# Patient Record
Sex: FEMALE | Race: WHITE | Employment: FULL TIME | URBAN - NONMETROPOLITAN AREA
[De-identification: names, ages, dates, MRNs, and addresses within clinical notes are randomized per-mention and may not be internally consistent; named-entity substitution may affect disease eponyms.]

---

## 2017-02-06 RX ORDER — DICYCLOMINE HYDROCHLORIDE 10 MG/1
CAPSULE ORAL
Qty: 240 CAPSULE | Refills: 2 | Status: SHIPPED | OUTPATIENT
Start: 2017-02-06 | End: 2017-12-05

## 2017-02-07 ENCOUNTER — TELEPHONE (OUTPATIENT)
Dept: CARDIOLOGY | Age: 47
End: 2017-02-07

## 2017-02-07 ENCOUNTER — OFFICE VISIT (OUTPATIENT)
Dept: CARDIOLOGY | Age: 47
End: 2017-02-07
Payer: COMMERCIAL

## 2017-02-07 VITALS
HEIGHT: 60 IN | HEART RATE: 68 BPM | WEIGHT: 143 LBS | BODY MASS INDEX: 28.07 KG/M2 | SYSTOLIC BLOOD PRESSURE: 100 MMHG | DIASTOLIC BLOOD PRESSURE: 72 MMHG

## 2017-02-07 DIAGNOSIS — I34.0 MILD MITRAL REGURGITATION BY PRIOR ECHOCARDIOGRAM: Primary | ICD-10-CM

## 2017-02-07 DIAGNOSIS — R00.2 INTERMITTENT PALPITATIONS: ICD-10-CM

## 2017-02-07 DIAGNOSIS — R06.02 SOB (SHORTNESS OF BREATH): ICD-10-CM

## 2017-02-07 PROCEDURE — 99213 OFFICE O/P EST LOW 20 MIN: CPT | Performed by: NURSE PRACTITIONER

## 2017-02-17 ENCOUNTER — HOSPITAL ENCOUNTER (OUTPATIENT)
Dept: NON INVASIVE DIAGNOSTICS | Age: 47
Discharge: HOME | End: 2017-02-17
Payer: COMMERCIAL

## 2017-02-17 ENCOUNTER — TELEPHONE (OUTPATIENT)
Dept: GASTROENTEROLOGY | Age: 47
End: 2017-02-17

## 2017-02-17 DIAGNOSIS — R06.02 SOB (SHORTNESS OF BREATH): ICD-10-CM

## 2017-02-17 DIAGNOSIS — I34.0 MILD MITRAL REGURGITATION BY PRIOR ECHOCARDIOGRAM: ICD-10-CM

## 2017-02-17 PROCEDURE — 93306 TTE W/DOPPLER COMPLETE: CPT

## 2017-08-15 ENCOUNTER — HOSPITAL ENCOUNTER (OUTPATIENT)
Dept: NEUROLOGY | Age: 47
Discharge: HOME | End: 2017-08-15
Payer: COMMERCIAL

## 2017-08-15 PROCEDURE — 95911 NRV CNDJ TEST 9-10 STUDIES: CPT | Performed by: PSYCHIATRY & NEUROLOGY

## 2017-08-15 PROCEDURE — 95911 NRV CNDJ TEST 9-10 STUDIES: CPT

## 2017-08-15 PROCEDURE — 95886 MUSC TEST DONE W/N TEST COMP: CPT | Performed by: PSYCHIATRY & NEUROLOGY

## 2017-08-15 PROCEDURE — 95886 MUSC TEST DONE W/N TEST COMP: CPT

## 2017-08-25 ENCOUNTER — TELEPHONE (OUTPATIENT)
Dept: CARDIOLOGY | Age: 47
End: 2017-08-25

## 2017-09-25 ENCOUNTER — HOSPITAL ENCOUNTER (OUTPATIENT)
Dept: WOMENS IMAGING | Age: 47
Discharge: HOME | End: 2017-09-25
Payer: COMMERCIAL

## 2017-09-25 DIAGNOSIS — Z12.31 ENCOUNTER FOR SCREENING MAMMOGRAM FOR MALIGNANT NEOPLASM OF BREAST: ICD-10-CM

## 2017-09-25 PROCEDURE — 77063 BREAST TOMOSYNTHESIS BI: CPT

## 2017-10-19 ENCOUNTER — OFFICE VISIT (OUTPATIENT)
Dept: CARDIOLOGY | Age: 47
End: 2017-10-19
Payer: COMMERCIAL

## 2017-10-19 VITALS
BODY MASS INDEX: 29.25 KG/M2 | WEIGHT: 149 LBS | SYSTOLIC BLOOD PRESSURE: 108 MMHG | DIASTOLIC BLOOD PRESSURE: 60 MMHG | HEIGHT: 60 IN | HEART RATE: 68 BPM

## 2017-10-19 DIAGNOSIS — I38 VALVULAR HEART DISEASE: Primary | ICD-10-CM

## 2017-10-19 DIAGNOSIS — R00.2 INTERMITTENT PALPITATIONS: ICD-10-CM

## 2017-10-19 PROCEDURE — 99213 OFFICE O/P EST LOW 20 MIN: CPT | Performed by: INTERNAL MEDICINE

## 2017-10-24 NOTE — PROGRESS NOTES
[facility]  [facility], [address] [place] [address] Drive, [address] [place] [address], [address]  The following was transcribed by Gypsy Almanza M.T. Kay Francis : 1970, 41 y.o. Female        Office Visit:  10/19/2017    Chief Complaint   Patient presents with    Annual Exam     States no cardiac complaints today. HISTORY OF PRESENT ILLNESS  Ms. Kay Francis is seen here for valvular insufficiency and arrhythmia. This is a routine follow up. Isra Osman presents today doing very well. She is very active without angina, overt heart failure, syncope or stroke-like problems.        Patient Active Problem List   Diagnosis Code    Mitral valve prolapse I34.1    Chest pain R07.9    Fatigue R53.83    Palpitations R00.2    Family history of ischemic heart disease Z82.49    Allergic rhinitis J30.9    Chronic constipation K59.09    LLQ abdominal pain R10.32    Rectal bleeding K62.5    Change in bowel habits R19.4    Diarrhea R19.7    Abnormal CT of the abdomen R93.5    Colon wall thickening K63.9    History of Clostridium difficile colitis Z86.19    SOB (shortness of breath) R06.02    Mild mitral regurgitation by prior echocardiogram I34.0    Bilateral hand numbness R20.0     Past Medical History:   Diagnosis Date    Allergic rhinitis     Chest pain     admitted  to 2013 with negative stress testing    Dizziness     Fatigue     Headache(784.0)     Meningitis spinal     History of this when 25 months old    Mitral valve prolapse     Palpitations      Past Surgical History:   Procedure Laterality Date    COLONOSCOPY  2015    normal    COLONOSCOPY N/A 2016    Dr Latrice Lemus, punctate erosions of the left colon, C Diff (+)--6 yr recall      Family History   Problem Relation Age of Onset    Coronary Art Dis Father     Heart Disease Father     Crohn's Disease Mother     Liver Disease Mother      alcohol induced    Coronary Art Dis Other Family History    Colon Cancer Neg Hx     Colon Polyps Neg Hx     Esophageal Cancer Neg Hx     Liver Cancer Neg Hx     Rectal Cancer Neg Hx     Stomach Cancer Neg Hx      Social History   Substance Use Topics    Smoking status: Never Smoker    Smokeless tobacco: Never Used    Alcohol use 0.0 oz/week      Comment: gloria once or twice per week      Allergies   Allergen Reactions    Sulfa Antibiotics      Outpatient Prescriptions Marked as Taking for the 10/19/17 encounter (Office Visit) with Adal Oneil MD   Medication Sig Dispense Refill    dicyclomine (BENTYL) 10 MG capsule Take 2 capsules by mouth 4 times daily (Patient taking differently: Take 2 capsules by mouth 4 times daily as needed) 240 capsule 2    venlafaxine (EFFEXOR XR) 150 MG extended release capsule Take 150 mg by mouth daily   2    hydrocortisone (ANUSOL-HC) 2.5 % rectal cream Place rectally 2 times daily. 1 Tube 1    ipratropium (ATROVENT) 0.06 % nasal spray USE 2 SPRAYS IN EACH NOSTRIL THREE TIMES A DAY AS NEEDED FOR RHINITIS 15 mL 0    cyanocobalamin 1000 MCG/ML injection Inject 1 ml into muscle every two weeks 10 mL 0    acebutolol (SECTRAL) 200 MG capsule Take 1 capsule by mouth 2 times daily (Patient taking differently: Take 200 mg by mouth 2 times daily Indications: MVP ) 60 capsule 5    Cholecalciferol (VITAMIN D3) 55944 UNITS CAPS Take 1 capsule by mouth once a week 12 capsule 3    ALPRAZolam (XANAX) 0.25 MG tablet Take 1 tablet by mouth 3 times daily as needed for Sleep or Anxiety 30 tablet 0    fexofenadine-pseudoephedrine (ALLEGRA-D ALLERGY & CONGESTION) 180-240 MG TB24 Take 1 tablet by mouth daily. 30 tablet 11    B-D 3CC LUER-DIDI SYR 25GX1\" 25G X 1\" 3 ML MISC USE 5O INJECT B-12 AS PRESCRIBED 10 each 3    Levonorgestrel (MIRENA IU) by Intrauterine route.  ibuprofen (ADVIL;MOTRIN) 200 MG tablet Take 200 mg by mouth every 6 hours as needed.          Data:  BP Readings from Last 3 Encounters:   10/19/17 108/60   02/07/17 100/72   11/22/16 116/70    Pulse Readings from Last 3 Encounters:   10/19/17 68   02/07/17 68   11/22/16 58        Transthoracic Echocardiography Report, 0217/17  IMPRESSION:  1. Normal M-Mode and 2-dimensional echocardiogram with a left ventricular  ejection fraction of 65%. 2.  Trivial mitral and tricuspid regurgitation. 3.  The right ventricular systolic pressure is 34 mmHg. REVIEW OF SYSTEMS  Constitutional:  Negative for diaphoresis, fever, appetite change or unexpected weight change. HENT:  Negative for nosebleeds, facial swelling, rhinorrhea and neck stiffness. RESPIRATORY:  Negative shortness of breath, cough or sputum production. No wheezing or stridor. CARDIOVASCULAR:  There is no angina, no overt heart failure, and no syncope. GASTROINTESTINAL:   Negative for abdominal distention. GENITOURINARY:  Negative for dysuria, urgency and frequency. MUSCULOSKELETAL:   Negative for myalgia, arthralgia and gait problem. SKIN:  Negative for color change, pallor, rash and wound. NEUROLOGICAL:   Negative for dizziness, weakness, light-headedness, numbness and headaches. Negative for speech difficulty. HEMATOLOGICAL:   Negative for bruising and bleeding easily. PSYCHIATRIC/BEHAVIORAL:   No excessive anxiety or confusion. Except as noted in the HPI, all other systems are negative. PHYSICAL EXAMINATION  GENERAL:  Alert and oriented x3 in no apparent distress. Short-term and long-term memory intact. Judgment intact. Oriented to time, place and person. No depression, anxiety or agitation. Vital Signs:  /60   Pulse 68   Ht 5' (1.524 m)   Wt 149 lb (67.6 kg)   BMI 29.10 kg/m²    HEAD:  Normocephalic without evidence of old or recent trauma. EYES:  Sclerae clear. Conjunctivae pink. Pupils equal and round. NOSE:  Negative nasal discharge or epistaxis. THROAT:  No lesions on lips or buccal mucosa. NECK:  Supple without mass or JVD.   Carotid pulses 2+ to

## 2017-12-05 ENCOUNTER — OFFICE VISIT (OUTPATIENT)
Dept: OBGYN | Age: 47
End: 2017-12-05
Payer: COMMERCIAL

## 2017-12-05 VITALS
BODY MASS INDEX: 28.66 KG/M2 | HEIGHT: 60 IN | HEART RATE: 63 BPM | DIASTOLIC BLOOD PRESSURE: 74 MMHG | WEIGHT: 146 LBS | SYSTOLIC BLOOD PRESSURE: 107 MMHG

## 2017-12-05 DIAGNOSIS — Z12.4 SCREENING FOR CERVICAL CANCER: ICD-10-CM

## 2017-12-05 DIAGNOSIS — Z01.419 WELL WOMAN EXAM WITH ROUTINE GYNECOLOGICAL EXAM: Primary | ICD-10-CM

## 2017-12-05 PROCEDURE — 99396 PREV VISIT EST AGE 40-64: CPT | Performed by: NURSE PRACTITIONER

## 2017-12-05 RX ORDER — M-VIT,TX,IRON,MINS/CALC/FOLIC 27MG-0.4MG
1 TABLET ORAL DAILY
COMMUNITY

## 2017-12-05 ASSESSMENT — ENCOUNTER SYMPTOMS
EYES NEGATIVE: 1
GASTROINTESTINAL NEGATIVE: 1
RESPIRATORY NEGATIVE: 1

## 2017-12-05 NOTE — PROGRESS NOTES
Right eye exhibits no discharge. Neck: No thyroid mass and no thyromegaly present. Cardiovascular: Normal rate, regular rhythm and normal heart sounds. No murmur heard. Pulmonary/Chest: Effort normal and breath sounds normal. She has no wheezes. Right breast exhibits no inverted nipple, no mass, no nipple discharge, no skin change and no tenderness. Left breast exhibits no inverted nipple, no mass, no nipple discharge, no skin change and no tenderness. Breasts are symmetrical.   Abdominal: Soft. Bowel sounds are normal. She exhibits no distension and no mass. There is no tenderness. Hernia confirmed negative in the right inguinal area and confirmed negative in the left inguinal area. Genitourinary: Rectal exam shows no external hemorrhoid. No breast swelling, tenderness or discharge. There is no rash, tenderness or lesion on the right labia. There is no rash, tenderness or lesion on the left labia. Uterus is not enlarged and not tender. Cervix exhibits no motion tenderness and no discharge (normal cervical mucosa). Right adnexum displays no mass, no tenderness and no fullness. Left adnexum displays no mass, no tenderness and no fullness. No tenderness in the vagina. No vaginal discharge found. Genitourinary Comments: Pap collected for cervical cytology; IUD strings not visualized    Musculoskeletal: Normal range of motion. She exhibits no edema or tenderness. Lymphadenopathy:        Right cervical: No superficial cervical, no deep cervical and no posterior cervical adenopathy present. Left cervical: No superficial cervical, no deep cervical and no posterior cervical adenopathy present. Right axillary: No pectoral and no lateral adenopathy present. Left axillary: No pectoral and no lateral adenopathy present. Right: No inguinal, no supraclavicular and no epitrochlear adenopathy present. Left: No inguinal, no supraclavicular and no epitrochlear adenopathy present. Neurological: She is alert and oriented to person, place, and time. She has normal reflexes. Skin: Skin is warm and dry. No rash noted. Psychiatric: She has a normal mood and affect. Nursing note and vitals reviewed. 1. Well woman exam with routine gynecological exam     2. Screening for cervical cancer  PAP SMEAR       MEDICATIONS:  No orders of the defined types were placed in this encounter. ORDERS:  Orders Placed This Encounter   Procedures    PAP SMEAR       PLAN:  Pap collected  Will order IUD and will remove and re-insert same day. Patient Instructions   Patient Education        Well Visit, Ages 25 to 48: Care Instructions  Your Care Instructions  Physical exams can help you stay healthy. Your doctor has checked your overall health and may have suggested ways to take good care of yourself. He or she also may have recommended tests. At home, you can help prevent illness with healthy eating, regular exercise, and other steps. Follow-up care is a key part of your treatment and safety. Be sure to make and go to all appointments, and call your doctor if you are having problems. It's also a good idea to know your test results and keep a list of the medicines you take. How can you care for yourself at home? · Reach and stay at a healthy weight. This will lower your risk for many problems, such as obesity, diabetes, heart disease, and high blood pressure. · Get at least 30 minutes of physical activity on most days of the week. Walking is a good choice. You also may want to do other activities, such as running, swimming, cycling, or playing tennis or team sports. Discuss any changes in your exercise program with your doctor. · Do not smoke or allow others to smoke around you. If you need help quitting, talk to your doctor about stop-smoking programs and medicines. These can increase your chances of quitting for good.   · Talk to your doctor about whether you have any risk factors for

## 2017-12-05 NOTE — PATIENT INSTRUCTIONS
Patient Education        Well Visit, Ages 25 to 48: Care Instructions  Your Care Instructions  Physical exams can help you stay healthy. Your doctor has checked your overall health and may have suggested ways to take good care of yourself. He or she also may have recommended tests. At home, you can help prevent illness with healthy eating, regular exercise, and other steps. Follow-up care is a key part of your treatment and safety. Be sure to make and go to all appointments, and call your doctor if you are having problems. It's also a good idea to know your test results and keep a list of the medicines you take. How can you care for yourself at home? · Reach and stay at a healthy weight. This will lower your risk for many problems, such as obesity, diabetes, heart disease, and high blood pressure. · Get at least 30 minutes of physical activity on most days of the week. Walking is a good choice. You also may want to do other activities, such as running, swimming, cycling, or playing tennis or team sports. Discuss any changes in your exercise program with your doctor. · Do not smoke or allow others to smoke around you. If you need help quitting, talk to your doctor about stop-smoking programs and medicines. These can increase your chances of quitting for good. · Talk to your doctor about whether you have any risk factors for sexually transmitted infections (STIs). Having one sex partner (who does not have STIs and does not have sex with anyone else) is a good way to avoid these infections. · Use birth control if you do not want to have children at this time. Talk with your doctor about the choices available and what might be best for you. · Protect your skin from too much sun. When you're outdoors from 10 a.m. to 4 p.m., stay in the shade or cover up with clothing and a hat with a wide brim. Wear sunglasses that block UV rays.  Even when it's cloudy, put broad-spectrum sunscreen (SPF 30 or higher) on any condoms. For men  · Tests for sexually transmitted infections (STIs). Ask whether you should have tests for STIs. You may be at risk if you have sex with more than one person, especially if you do not wear a condom. · Testicular cancer exam. Ask your doctor whether you should check your testicles regularly. · Prostate exam. Talk to your doctor about whether you should have a blood test (called a PSA test) for prostate cancer. Experts differ on whether and when men should have this test. Some experts suggest it if you are older than 39 and are -American or have a father or brother who got prostate cancer when he was younger than 72. When should you call for help? Watch closely for changes in your health, and be sure to contact your doctor if you have any problems or symptoms that concern you. Where can you learn more? Go to https://Docinperubeneb.healthRapid Vocabulary. org and sign in to your E[employer] account. Enter P072 in the Qiniu box to learn more about \"Well Visit, Ages 25 to 48: Care Instructions. \"     If you do not have an account, please click on the \"Sign Up Now\" link. Current as of: July 19, 2016  Content Version: 11.3  © 3688-5560 [employer]. Care instructions adapted under license by [facility] ([facility]). If you have questions about a medical condition or this instruction, always ask your healthcare professional. [address]  any warranty or liability for your use of this information.

## 2017-12-20 ENCOUNTER — TELEPHONE (OUTPATIENT)
Dept: CARDIOLOGY | Age: 47
End: 2017-12-20

## 2017-12-27 ENCOUNTER — TELEPHONE (OUTPATIENT)
Dept: CARDIOLOGY | Age: 47
End: 2017-12-27

## 2018-01-19 ENCOUNTER — PROCEDURE VISIT (OUTPATIENT)
Dept: OBGYN | Age: 48
End: 2018-01-19
Payer: COMMERCIAL

## 2018-01-19 ENCOUNTER — TELEPHONE (OUTPATIENT)
Dept: OBGYN | Age: 48
End: 2018-01-19

## 2018-01-19 VITALS
BODY MASS INDEX: 29.06 KG/M2 | HEIGHT: 60 IN | SYSTOLIC BLOOD PRESSURE: 102 MMHG | WEIGHT: 148 LBS | DIASTOLIC BLOOD PRESSURE: 70 MMHG

## 2018-01-19 DIAGNOSIS — Z30.430 ENCOUNTER FOR IUD INSERTION: Primary | ICD-10-CM

## 2018-01-19 DIAGNOSIS — Z30.432 ENCOUNTER FOR IUD REMOVAL: ICD-10-CM

## 2018-01-19 PROCEDURE — 58300 INSERT INTRAUTERINE DEVICE: CPT | Performed by: NURSE PRACTITIONER

## 2018-01-19 PROCEDURE — 58301 REMOVE INTRAUTERINE DEVICE: CPT | Performed by: NURSE PRACTITIONER

## 2018-01-19 PROCEDURE — 81025 URINE PREGNANCY TEST: CPT | Performed by: NURSE PRACTITIONER

## 2018-01-19 ASSESSMENT — ENCOUNTER SYMPTOMS
GASTROINTESTINAL NEGATIVE: 1
RESPIRATORY NEGATIVE: 1
EYES NEGATIVE: 1

## 2018-01-19 NOTE — TELEPHONE ENCOUNTER
Pt having discomfort post mirena insertion. Advised to changed positions to keep pressure off pelvis since sitting hurts most and take ibuprofen. Pt v/u.

## 2018-01-19 NOTE — PROGRESS NOTES
Godfrey Garcia is a 41 y.o. female who presents today for her medical conditions/ complaints as noted below. Godfrey Garcia is c/o of Contraception (IUD removal and new one inserted)        HPI  Pt here for IUD removal-strings not visualized at most recent visit-and IUD insertion. Last mammogram: 2017  Last pap smear: 2017  Contraception: IUD placed around   : 2  Para: 2  AB: 0  Last bone density: NA  Last colonoscopy: , return in 10yrs    No LMP recorded. Patient is not currently having periods (Reason: Other - See Notes).   [id number]    Past Medical History:   Diagnosis Date    Allergic rhinitis     Anxiety     Chest pain     admitted  to 2013 with negative stress testing    Depression     Dizziness     Fatigue     Headache(784.0)     Meningitis spinal     History of this when 25 months old    Mitral valve prolapse     Palpitations      Past Surgical History:   Procedure Laterality Date    COLONOSCOPY  2015    normal    COLONOSCOPY N/A 2016    Dr Chilo Caldera, punctate erosions of the left colon, C Diff (+)--6 yr recall     Family History   Problem Relation Age of Onset    Coronary Art Dis Father     Heart Disease Father     Crohn's Disease Mother     Liver Disease Mother      alcohol induced    Coronary Art Dis Other      Family History    Colon Cancer Neg Hx     Colon Polyps Neg Hx     Esophageal Cancer Neg Hx     Liver Cancer Neg Hx     Rectal Cancer Neg Hx     Stomach Cancer Neg Hx      Social History   Substance Use Topics    Smoking status: Never Smoker    Smokeless tobacco: Never Used    Alcohol use 0.0 oz/week      Comment: gloria once or twice per week       Current Outpatient Prescriptions   Medication Sig Dispense Refill    Multiple Vitamins-Minerals (THERAPEUTIC MULTIVITAMIN-MINERALS) tablet Take 1 tablet by mouth daily      Lactobacillus (PROBIOTIC ACIDOPHILUS PO) Take by mouth      amitriptyline (ELAVIL) 10 MG tablet Take

## 2018-04-16 ENCOUNTER — OFFICE VISIT (OUTPATIENT)
Dept: CARDIOLOGY | Age: 48
End: 2018-04-16
Payer: COMMERCIAL

## 2018-04-16 VITALS
DIASTOLIC BLOOD PRESSURE: 60 MMHG | HEART RATE: 72 BPM | SYSTOLIC BLOOD PRESSURE: 90 MMHG | HEIGHT: 60 IN | BODY MASS INDEX: 29.64 KG/M2 | WEIGHT: 151 LBS

## 2018-04-16 DIAGNOSIS — I34.0 MILD MITRAL REGURGITATION BY PRIOR ECHOCARDIOGRAM: ICD-10-CM

## 2018-04-16 DIAGNOSIS — R00.2 PALPITATIONS: Primary | ICD-10-CM

## 2018-04-16 PROCEDURE — 93000 ELECTROCARDIOGRAM COMPLETE: CPT | Performed by: CLINICAL NURSE SPECIALIST

## 2018-04-16 PROCEDURE — 99213 OFFICE O/P EST LOW 20 MIN: CPT | Performed by: CLINICAL NURSE SPECIALIST

## 2018-04-16 ASSESSMENT — ENCOUNTER SYMPTOMS
VOMITING: 0
NAUSEA: 0
HEARTBURN: 0
ORTHOPNEA: 0
COUGH: 0
BLURRED VISION: 0
SHORTNESS OF BREATH: 0

## 2018-07-31 LAB
ALBUMIN SERPL-MCNC: 4.5 G/DL (ref 3.5–5.2)
ALP BLD-CCNC: 58 U/L (ref 35–104)
ALT SERPL-CCNC: 21 U/L (ref 5–33)
ANION GAP SERPL CALCULATED.3IONS-SCNC: 13 MMOL/L (ref 7–19)
AST SERPL-CCNC: 37 U/L (ref 5–32)
BASOPHILS ABSOLUTE: 0.1 K/UL (ref 0–0.2)
BASOPHILS RELATIVE PERCENT: 0.9 % (ref 0–1)
BILIRUB SERPL-MCNC: 0.3 MG/DL (ref 0.2–1.2)
BUN BLDV-MCNC: 8 MG/DL (ref 6–20)
CALCIUM SERPL-MCNC: 9.3 MG/DL (ref 8.6–10)
CHLORIDE BLD-SCNC: 99 MMOL/L (ref 98–111)
CO2: 28 MMOL/L (ref 22–29)
CREAT SERPL-MCNC: 0.9 MG/DL (ref 0.5–0.9)
EOSINOPHILS ABSOLUTE: 0.3 K/UL (ref 0–0.6)
EOSINOPHILS RELATIVE PERCENT: 4.2 % (ref 0–5)
GFR NON-AFRICAN AMERICAN: >60
GLUCOSE BLD-MCNC: 68 MG/DL (ref 74–109)
HCT VFR BLD CALC: 41.7 % (ref 37–47)
HEMOGLOBIN: 13.6 G/DL (ref 12–16)
LYMPHOCYTES ABSOLUTE: 2 K/UL (ref 1.1–4.5)
LYMPHOCYTES RELATIVE PERCENT: 31.1 % (ref 20–40)
MCH RBC QN AUTO: 31.1 PG (ref 27–31)
MCHC RBC AUTO-ENTMCNC: 32.6 G/DL (ref 33–37)
MCV RBC AUTO: 95.2 FL (ref 81–99)
MONOCYTES ABSOLUTE: 0.5 K/UL (ref 0–0.9)
MONOCYTES RELATIVE PERCENT: 8.2 % (ref 0–10)
NEUTROPHILS ABSOLUTE: 3.6 K/UL (ref 1.5–7.5)
NEUTROPHILS RELATIVE PERCENT: 55.1 % (ref 50–65)
PDW BLD-RTO: 11.7 % (ref 11.5–14.5)
PLATELET # BLD: 232 K/UL (ref 130–400)
PMV BLD AUTO: 10.9 FL (ref 9.4–12.3)
POTASSIUM SERPL-SCNC: 4.3 MMOL/L (ref 3.5–5)
RBC # BLD: 4.38 M/UL (ref 4.2–5.4)
SODIUM BLD-SCNC: 140 MMOL/L (ref 136–145)
TOTAL PROTEIN: 7.6 G/DL (ref 6.6–8.7)
WBC # BLD: 6.5 K/UL (ref 4.8–10.8)

## 2018-08-02 LAB
HEMOCCULT SP2 STL QL: NORMAL
HEMOCCULT SP3 STL QL: NORMAL
OCCULT BLOOD SCREENING: NORMAL

## 2018-08-07 LAB — LACTOFERRIN, FECAL: NEGATIVE

## 2018-08-10 LAB
CULTURE, STOOL: NORMAL
E COLI SHIGA TOXIN ASSAY: NORMAL
OVA AND PARASITE TRICHROME: NEGATIVE
OVA AND PARASITE TRICHROME: NEGATIVE
OVA AND PARASITE WET MOUNT: NEGATIVE
OVA AND PARASITE WET MOUNT: NEGATIVE

## 2018-08-13 LAB
OVA AND PARASITE TRICHROME: NEGATIVE
OVA AND PARASITE WET MOUNT: NEGATIVE

## 2018-12-07 ENCOUNTER — OFFICE VISIT (OUTPATIENT)
Dept: OBGYN | Age: 48
End: 2018-12-07
Payer: COMMERCIAL

## 2018-12-07 VITALS
BODY MASS INDEX: 30.23 KG/M2 | DIASTOLIC BLOOD PRESSURE: 70 MMHG | HEART RATE: 72 BPM | SYSTOLIC BLOOD PRESSURE: 106 MMHG | TEMPERATURE: 98.4 F | WEIGHT: 154 LBS | HEIGHT: 60 IN

## 2018-12-07 DIAGNOSIS — Z12.31 ENCOUNTER FOR SCREENING MAMMOGRAM FOR BREAST CANCER: ICD-10-CM

## 2018-12-07 DIAGNOSIS — R32 URINARY INCONTINENCE, UNSPECIFIED TYPE: ICD-10-CM

## 2018-12-07 DIAGNOSIS — Z12.4 SCREENING FOR CERVICAL CANCER: ICD-10-CM

## 2018-12-07 DIAGNOSIS — R23.2 HOT FLASHES: ICD-10-CM

## 2018-12-07 DIAGNOSIS — Z01.419 WELL WOMAN EXAM WITH ROUTINE GYNECOLOGICAL EXAM: Primary | ICD-10-CM

## 2018-12-07 DIAGNOSIS — Z11.51 SCREENING FOR HPV (HUMAN PAPILLOMAVIRUS): ICD-10-CM

## 2018-12-07 LAB
APPEARANCE FLUID: ABNORMAL
BILIRUBIN, POC: ABNORMAL
BLOOD URINE, POC: NEGATIVE
CLARITY, POC: CLEAR
COLOR, POC: YELLOW
GLUCOSE URINE, POC: NEGATIVE
KETONES, POC: ABNORMAL
LEUKOCYTE EST, POC: NEGATIVE
NITRITE, POC: NEGATIVE
PH, POC: 6
PROTEIN, POC: ABNORMAL
SPECIFIC GRAVITY, POC: >=1.03
UROBILINOGEN, POC: ABNORMAL

## 2018-12-07 PROCEDURE — 99396 PREV VISIT EST AGE 40-64: CPT | Performed by: NURSE PRACTITIONER

## 2018-12-07 PROCEDURE — 81002 URINALYSIS NONAUTO W/O SCOPE: CPT | Performed by: NURSE PRACTITIONER

## 2018-12-07 ASSESSMENT — ENCOUNTER SYMPTOMS
EYES NEGATIVE: 1
RESPIRATORY NEGATIVE: 1
GASTROINTESTINAL NEGATIVE: 1

## 2018-12-07 NOTE — PATIENT INSTRUCTIONS
for many problems, such as obesity, diabetes, heart disease, and high blood pressure. · Get at least 30 minutes of physical activity on most days of the week. Walking is a good choice. You also may want to do other activities, such as running, swimming, cycling, or playing tennis or team sports. Discuss any changes in your exercise program with your doctor. · Do not smoke or allow others to smoke around you. If you need help quitting, talk to your doctor about stop-smoking programs and medicines. These can increase your chances of quitting for good. · Talk to your doctor about whether you have any risk factors for sexually transmitted infections (STIs). Having one sex partner (who does not have STIs and does not have sex with anyone else) is a good way to avoid these infections. · Use birth control if you do not want to have children at this time. Talk with your doctor about the choices available and what might be best for you. · Protect your skin from too much sun. When you're outdoors from 10 a.m. to 4 p.m., stay in the shade or cover up with clothing and a hat with a wide brim. Wear sunglasses that block UV rays. Even when it's cloudy, put broad-spectrum sunscreen (SPF 30 or higher) on any exposed skin. · See a dentist one or two times a year for checkups and to have your teeth cleaned. · Wear a seat belt in the car. · Drink alcohol in moderation, if at all. That means no more than 2 drinks a day for men and 1 drink a day for women. Follow your doctor's advice about when to have certain tests. These tests can spot problems early. For everyone  · Cholesterol. Have the fat (cholesterol) in your blood tested after age 21. Your doctor will tell you how often to have this done based on your age, family history, or other things that can increase your risk for heart disease. · Blood pressure. Have your blood pressure checked during a routine doctor visit.  Your doctor will tell you how often to check your blood pressure based on your age, your blood pressure results, and other factors. · Vision. Talk with your doctor about how often to have a glaucoma test.  · Diabetes. Ask your doctor whether you should have tests for diabetes. · Colon cancer. Have a test for colon cancer at age 48. You may have one of several tests. If you are younger than 48, you may need a test earlier if you have any risk factors. Risk factors include whether you already had a precancerous polyp removed from your colon or whether your parent, brother, sister, or child has had colon cancer. For women  · Breast exam and mammogram. Talk to your doctor about when you should have a clinical breast exam and a mammogram. Medical experts differ on whether and how often women under 50 should have these tests. Your doctor can help you decide what is right for you. · Pap test and pelvic exam. Begin Pap tests at age 24. A Pap test is the best way to find cervical cancer. The test often is part of a pelvic exam. Ask how often to have this test.  · Tests for sexually transmitted infections (STIs). Ask whether you should have tests for STIs. You may be at risk if you have sex with more than one person, especially if your partners do not wear condoms. For men  · Tests for sexually transmitted infections (STIs). Ask whether you should have tests for STIs. You may be at risk if you have sex with more than one person, especially if you do not wear a condom. · Testicular cancer exam. Ask your doctor whether you should check your testicles regularly. · Prostate exam. Talk to your doctor about whether you should have a blood test (called a PSA test) for prostate cancer. Experts differ on whether and when men should have this test. Some experts suggest it if you are older than 39 and are -American or have a father or brother who got prostate cancer when he was younger than 72. When should you call for help?   Watch closely for changes in your health, and be sure to contact your doctor if you have any problems or symptoms that concern you. Where can you learn more? Go to https://chpepiceweb.AlwaySupport. org and sign in to your MoboTap account. Enter P072 in the Tissue Regenix box to learn more about \"Well Visit, Ages 25 to 48: Care Instructions. \"     If you do not have an account, please click on the \"Sign Up Now\" link. Current as of: March 29, 2018  Content Version: 11.8  © 5635-0055 [employer], Incorporated. Care instructions adapted under license by [facility] ([facility]). If you have questions about a medical condition or this instruction, always ask your healthcare professional. [address] any warranty or liability for your use of this information.

## 2018-12-12 LAB
HPV TYPE 16: NOT DETECTED
HPV TYPE 18: NOT DETECTED
INTERPRETATION: NORMAL
OTHER HIGH RISK HPV: NOT DETECTED
SOURCE: NORMAL

## 2018-12-18 ENCOUNTER — HOSPITAL ENCOUNTER (OUTPATIENT)
Dept: WOMENS IMAGING | Age: 48
Discharge: HOME | End: 2018-12-18
Payer: COMMERCIAL

## 2018-12-18 DIAGNOSIS — Z12.31 ENCOUNTER FOR SCREENING MAMMOGRAM FOR BREAST CANCER: ICD-10-CM

## 2018-12-18 PROCEDURE — 77067 SCR MAMMO BI INCL CAD: CPT

## 2018-12-18 PROCEDURE — 77063 BREAST TOMOSYNTHESIS BI: CPT

## 2019-01-07 ENCOUNTER — HOSPITAL ENCOUNTER (OUTPATIENT)
Dept: ULTRASOUND IMAGING | Age: 49
Discharge: HOME | End: 2019-01-07
Payer: COMMERCIAL

## 2019-01-07 ENCOUNTER — HOSPITAL ENCOUNTER (OUTPATIENT)
Dept: WOMENS IMAGING | Age: 49
Discharge: HOME | End: 2019-01-07
Payer: COMMERCIAL

## 2019-01-07 DIAGNOSIS — R92.2 BREAST DENSITY: ICD-10-CM

## 2019-01-07 PROCEDURE — 76642 ULTRASOUND BREAST LIMITED: CPT

## 2019-01-07 PROCEDURE — 77065 DX MAMMO INCL CAD UNI: CPT

## 2019-01-16 RX ORDER — OXYBUTYNIN CHLORIDE 10 MG/1
10 TABLET, EXTENDED RELEASE ORAL DAILY
Qty: 30 TABLET | Refills: 3 | Status: SHIPPED | OUTPATIENT
Start: 2019-01-16 | End: 2019-05-24 | Stop reason: ALTCHOICE

## 2019-03-26 DIAGNOSIS — R32 URINARY INCONTINENCE, UNSPECIFIED TYPE: Primary | ICD-10-CM

## 2019-04-08 ENCOUNTER — OFFICE VISIT (OUTPATIENT)
Dept: CARDIOLOGY | Age: 49
End: 2019-04-08
Payer: COMMERCIAL

## 2019-04-08 VITALS
HEART RATE: 72 BPM | HEIGHT: 60 IN | WEIGHT: 152 LBS | BODY MASS INDEX: 29.84 KG/M2 | SYSTOLIC BLOOD PRESSURE: 106 MMHG | DIASTOLIC BLOOD PRESSURE: 68 MMHG

## 2019-04-08 DIAGNOSIS — R00.2 PALPITATIONS: Primary | ICD-10-CM

## 2019-04-08 PROCEDURE — 99213 OFFICE O/P EST LOW 20 MIN: CPT | Performed by: NURSE PRACTITIONER

## 2019-04-08 NOTE — PATIENT INSTRUCTIONS
increases your length and quality of life. 5)  Eat better - A healthy diet is one of your best weapons for fighting cardiovascular disease. When you eat a heart healthy diet, you improve your chances for feeling good and staying healthy for life. 6)  Lose weight - when you shed extra fat an unnecessary pounds, you reduce the burden on your hear, lungs, blood vessels and skeleton. You give yourself the gift of active living, you lower your blood pressure and help yourself feel better. 7) Stop smoking - cigarette smokers have a higher risk of developing cardiovascular disease. If  You smoke, quitting is the best thing you can do for your health. Check American Heart Association on line for more information on Life's Simple 7 and tips for healthy living.       Patient Education        A Healthy Heart: Care Instructions  Your Care Instructions    Heart disease occurs when a substance called plaque builds up in the vessels that supply oxygen-rich blood to your heart. This can narrow the blood vessels and reduce blood flow. A heart attack happens when blood flow is completely blocked. A high-fat diet, smoking, and other factors increase the risk of heart disease. Your doctor has found that you have a chance of having heart disease. You can do lots of things to keep your heart healthy. It may not be easy, but you can change your diet, exercise more, and quit smoking. These steps really work to lower your chance of heart disease. Follow-up care is a key part of your treatment and safety. Be sure to make and go to all appointments, and call your doctor if you are having problems. It's also a good idea to know your test results and keep a list of the medicines you take. How can you care for yourself at home? Diet    · Use less salt when you cook and eat. This helps lower your blood pressure. Taste food before salting. Add only a little salt when you think you need it.  With time, your taste buds will adjust to less salt.     · Eat fewer snack items, fast foods, canned soups, and other high-salt, high-fat, processed foods.     · Read food labels and try to avoid saturated and trans fats. They increase your risk of heart disease by raising cholesterol levels.     · Limit the amount of solid fat-butter, margarine, and shortening-you eat. Use olive, peanut, or canola oil when you cook. Bake, broil, and steam foods instead of frying them.     · Eating fish can lower your risk for heart disease. Eat at least 2 servings of fish a week. [place], mackerel, herring, sardines, and chunk light tuna are very good choices. These fish contain omega-3 fatty acids.     · Eat a variety of fruit and vegetables every day. Dark green, deep orange, red, or yellow fruits and vegetables are especially good for you. Examples include spinach, carrots, peaches, and berries.     · Foods high in fiber can reduce your cholesterol and provide important vitamins and minerals. High-fiber foods include whole-grain cereals and breads, oatmeal, beans, brown rice, citrus fruits, and apples.     · Limit drinks and foods with added sugar. These include candy, desserts, and soda pop.    Lifestyle changes    · If your doctor recommends it, get more exercise. Walking is a good choice. Bit by bit, increase the amount you walk every day. Try for at least 30 minutes on most days of the week. You also may want to swim, bike, or do other activities.     · Do not smoke. If you need help quitting, talk to your doctor about stop-smoking programs and medicines. These can increase your chances of quitting for good. Quitting smoking may be the most important step you can take to protect your heart. It is never too late to quit. You will get health benefits right away.     · Limit alcohol to 2 drinks a day for men and 1 drink a day for women. Too much alcohol can cause health problems. Medicines    · Take your medicines exactly as prescribed.  Call your doctor if you think you

## 2019-04-08 NOTE — PROGRESS NOTES
[facility], [place]. [address] StreetGregoryAbra[facility] [address], [address]  [phone] office  [phone] fax      OFFICE VISIT:  2019    Stanley Serrato - : 1970    Reason For Visit:  Eliseo Gomes is a 57 y.o. female who is here for 1 Year Follow Up (Patient presents for cardiology follow up doing very well.) and Palpitations    The patient presents today for cardiology follow up. Overall, the patient is doing well from a cardiac standpoint without symptoms to suggest myocardial ischemia. BP is normal and palpitations are well controlled on Sectral.  The patient's PCP monitors cholesterol. Subjective  Lorry denies exertional chest pain, shortness of breath, orthopnea, paroxysmal nocturnal dyspnea, syncope, presyncope, sustained arrythmia, edema and fatigue. The patient denies numbness or weakness to suggest cerebrovascular accident or transient ischemic attack.       Stanley Serrato has the following history as recorded in [facility]:    Patient Active Problem List   Diagnosis Code    Mitral valve prolapse I34.1    Chest pain R07.9    Fatigue R53.83    Palpitations R00.2    Family history of ischemic heart disease Z82.49    Allergic rhinitis J30.9    Chronic constipation K59.09    LLQ abdominal pain R10.32    Rectal bleeding K62.5    Change in bowel habits R19.4    Diarrhea R19.7    Abnormal CT of the abdomen R93.5    Colon wall thickening K63.9    History of Clostridium difficile colitis Z86.19    SOB (shortness of breath) R06.02    Mild mitral regurgitation by prior echocardiogram I34.0    Bilateral hand numbness R20.0     Past Medical History:   Diagnosis Date    Allergic rhinitis     Anxiety     Chest pain     admitted  to 2013 with negative stress testing    Depression     Dizziness     Fatigue     Headache(784.0)     Meningitis spinal     History of this when 25 months old    Mitral valve prolapse  Palpitations      Past Surgical History:   Procedure Laterality Date    COLONOSCOPY  6/2015    normal    COLONOSCOPY N/A 9/27/2016    Dr Ariana Carrera, punctate erosions of the left colon, C Diff (+)--6 yr recall     Family History   Problem Relation Age of Onset    Coronary Art Dis Father     Heart Disease Father     Crohn's Disease Mother     Liver Disease Mother         alcohol induced    Coronary Art Dis Other         Family History    Colon Cancer Neg Hx     Colon Polyps Neg Hx     Esophageal Cancer Neg Hx     Liver Cancer Neg Hx     Rectal Cancer Neg Hx     Stomach Cancer Neg Hx      Social History     Tobacco Use    Smoking status: Never Smoker    Smokeless tobacco: Never Used   Substance Use Topics    Alcohol use:  Yes     Alcohol/week: 0.0 oz     Comment: gloria once or twice per week      Current Outpatient Medications   Medication Sig Dispense Refill    Mirabegron ER (MYRBETRIQ) 25 MG TB24 Take 1 tablet by mouth daily 30 tablet 3    oxybutynin (DITROPAN XL) 10 MG extended release tablet Take 1 tablet by mouth daily 30 tablet 3    Multiple Vitamins-Minerals (THERAPEUTIC MULTIVITAMIN-MINERALS) tablet Take 1 tablet by mouth daily      Lactobacillus (PROBIOTIC ACIDOPHILUS PO) Take by mouth      amitriptyline (ELAVIL) 10 MG tablet Take 10 mg by mouth nightly as needed   2    venlafaxine (EFFEXOR XR) 150 MG extended release capsule Take 150 mg by mouth daily   2    ipratropium (ATROVENT) 0.06 % nasal spray USE 2 SPRAYS IN EACH NOSTRIL THREE TIMES A DAY AS NEEDED FOR RHINITIS 15 mL 0    cyanocobalamin 1000 MCG/ML injection Inject 1 ml into muscle every two weeks 10 mL 0    acebutolol (SECTRAL) 200 MG capsule Take 1 capsule by mouth 2 times daily (Patient taking differently: Take 200 mg by mouth 2 times daily Indications: MVP ) 60 capsule 5    Cholecalciferol (VITAMIN D3) 57325 UNITS CAPS Take 1 capsule by mouth once a week 12 capsule 3    ALPRAZolam (XANAX) 0.25 MG tablet Take 1 tablet by mouth 3 times daily as needed for Sleep or Anxiety 30 tablet 0    fexofenadine-pseudoephedrine (ALLEGRA-D ALLERGY & CONGESTION) 180-240 MG TB24 Take 1 tablet by mouth daily. 30 tablet 11    B-D 3CC LUER-DIDI SYR 25GX1\" 25G X 1\" 3 ML MISC USE 5O INJECT B-12 AS PRESCRIBED 10 each 3    Levonorgestrel (MIRENA IU) by Intrauterine route.  ibuprofen (ADVIL;MOTRIN) 200 MG tablet Take 200 mg by mouth every 6 hours as needed. No current facility-administered medications for this visit. Allergies: Sulfa antibiotics    Review of Systems  Constitutional - no appetite change, or unexpected weight change. No fever, chills or diaphoresis. No significant change in activity level or new onset of fatigue. HEENT - no significant rhinorrhea or epistaxis. No tinnitus or significant hearing loss. Eyes - no sudden vision change or amaurosis. No corneal arcus, xantholasma, subconjunctival hemorrhage or discharge. Respiratory - no significant wheezing, stridor, apnea or cough. No dyspnea on exertion or shortness of air. Cardiovascular - no exertional chest pain to suggest myocardial ischemia. No orthopnea or PND. No sensation of sustained arrythmia. No occurrence of slow heart rate. No palpitations. No claudication. No leg edema. Gastrointestinal - no abdominal swelling or pain. No blood in stool. No severe constipation, diarrhea, nausea, or vomiting. Genitourinary - no dysuria, frequency, or urgency. No flank pain or hematuria. Musculoskeletal - no back pain or myalgia. No problems with gait. Extremities - no clubbing, cyanosis or edema. Skin - no color change or rash. No pallor. No new surgical incision. Neurologic - no speech difficulty, facial asymmetry or lateralizing weakness. No seizures, presyncope or syncope. No significant dizziness. Hematologic - no easy bruising or excessive bleeding. Psychiatric - no severe anxiety or insomnia. No confusion.    All other review of systems are negative. Objective  Vital Signs - /68   Pulse 72   Ht 5' (1.524 m)   Wt 152 lb (68.9 kg)   BMI 29.69 kg/m²   General - Lorry is alert, cooperative, and pleasant. Well groomed. No acute distress. Body habitus - Body mass index is 29.69 kg/m². HEENT - Head is normocephalic. No circumoral cyanosis. Dentition is normal.  EYES -   Lids normal without ptosis. No discharge, edema or subconjunctival hemorrhage. Neck - Symmetrical without apparent mass or lymphadenopathy. Respiratory - Normal respiratory effort without use of accessory muscles. Ausculatation reveals vesicular breath sounds without crackles, wheezes, rub or rhonchi. Cardiovascular - No jugular venous distention. Auscultation reveals regular rate and rhythm. No audible clicks, gallop or rub. No murmur. No lower extremity varicosities. No carotid bruits. Abdominal -  No visible distention, mass or pulsations. Extremities - No clubbing or cyanosis. No statis dermatitis or ulcers. No edema. Musculoskeletal -   No Osler's nodes. No kyphosis or scoliosis. Gait is even and regular without limp or shuffle. Ambulates without assistance. Skin -  Warm and dry; no rash or pallor. No new surgical wound. Neurological - No focal neurological deficits. Thought processes coherent. No apparent tremor. Oriented to person, place and time. Psychiatric -  Appropriate affect and mood. Assessment:     Diagnosis Orders   1. Palpitations       DATE OF SERVICE:  02/17/2017   M-MODE AND TWO-DIMENSIONAL ECHOCARDIOGRAM:  1. Aortic root appears normal.  2.  The aortic valve has 3 leaflets with normal thickness and mobility. 3.  The mitral valve leaflets have normal thickness and mobility. 4.  The triscuspid valve leaflets have normal thickness and mobility. 5.  Left ventricular chamber size, wall thickness and wall motion appear  normal.  The left ventricular ejection fraction is calculated to be 65%.   6.  Normal for more information on Life's Simple 7 and tips for healthy living.      Nany Mohamud, APRN

## 2019-05-24 ENCOUNTER — HOSPITAL ENCOUNTER (EMERGENCY)
Age: 49
Discharge: HOME | End: 2019-05-24
Payer: COMMERCIAL

## 2019-05-24 ENCOUNTER — APPOINTMENT (OUTPATIENT)
Dept: CT IMAGING | Age: 49
End: 2019-05-24
Payer: COMMERCIAL

## 2019-05-24 VITALS
TEMPERATURE: 98 F | OXYGEN SATURATION: 99 % | WEIGHT: 148 LBS | RESPIRATION RATE: 16 BRPM | HEART RATE: 61 BPM | DIASTOLIC BLOOD PRESSURE: 61 MMHG | BODY MASS INDEX: 29.06 KG/M2 | HEIGHT: 60 IN | SYSTOLIC BLOOD PRESSURE: 105 MMHG

## 2019-05-24 DIAGNOSIS — R10.30 LOWER ABDOMINAL PAIN: Primary | ICD-10-CM

## 2019-05-24 DIAGNOSIS — K52.9 COLITIS: ICD-10-CM

## 2019-05-24 LAB
ALBUMIN SERPL-MCNC: 4.6 G/DL (ref 3.5–5.2)
ALP BLD-CCNC: 58 U/L (ref 35–104)
ALT SERPL-CCNC: 11 U/L (ref 5–33)
ANION GAP SERPL CALCULATED.3IONS-SCNC: 11 MMOL/L (ref 7–19)
AST SERPL-CCNC: 30 U/L (ref 5–32)
BASOPHILS ABSOLUTE: 0.1 K/UL (ref 0–0.2)
BASOPHILS RELATIVE PERCENT: 0.3 % (ref 0–1)
BILIRUB SERPL-MCNC: 0.4 MG/DL (ref 0.2–1.2)
BILIRUBIN URINE: ABNORMAL
BLOOD, URINE: NEGATIVE
BUN BLDV-MCNC: 13 MG/DL (ref 6–20)
CALCIUM SERPL-MCNC: 9.5 MG/DL (ref 8.6–10)
CHLORIDE BLD-SCNC: 105 MMOL/L (ref 98–111)
CLARITY: CLEAR
CO2: 27 MMOL/L (ref 22–29)
COLOR: YELLOW
CREAT SERPL-MCNC: 0.9 MG/DL (ref 0.5–0.9)
EOSINOPHILS ABSOLUTE: 0.1 K/UL (ref 0–0.6)
EOSINOPHILS RELATIVE PERCENT: 0.5 % (ref 0–5)
GFR NON-AFRICAN AMERICAN: >60
GLUCOSE BLD-MCNC: 113 MG/DL (ref 74–109)
GLUCOSE URINE: NEGATIVE MG/DL
HCG(URINE) PREGNANCY TEST: NEGATIVE
HCT VFR BLD CALC: 39.6 % (ref 37–47)
HEMOGLOBIN: 13.4 G/DL (ref 12–16)
KETONES, URINE: ABNORMAL MG/DL
LEUKOCYTE ESTERASE, URINE: NEGATIVE
LIPASE: 47 U/L (ref 13–60)
LYMPHOCYTES ABSOLUTE: 1.3 K/UL (ref 1.1–4.5)
LYMPHOCYTES RELATIVE PERCENT: 8 % (ref 20–40)
MCH RBC QN AUTO: 31.6 PG (ref 27–31)
MCHC RBC AUTO-ENTMCNC: 33.8 G/DL (ref 33–37)
MCV RBC AUTO: 93.4 FL (ref 81–99)
MONOCYTES ABSOLUTE: 0.7 K/UL (ref 0–0.9)
MONOCYTES RELATIVE PERCENT: 4.5 % (ref 0–10)
NEUTROPHILS ABSOLUTE: 14 K/UL (ref 1.5–7.5)
NEUTROPHILS RELATIVE PERCENT: 86.2 % (ref 50–65)
NITRITE, URINE: NEGATIVE
PDW BLD-RTO: 11.5 % (ref 11.5–14.5)
PH UA: 5.5 (ref 5–8)
PLATELET # BLD: 225 K/UL (ref 130–400)
PMV BLD AUTO: 10.7 FL (ref 9.4–12.3)
POTASSIUM SERPL-SCNC: 3.8 MMOL/L (ref 3.5–5)
PROTEIN UA: ABNORMAL MG/DL
RBC # BLD: 4.24 M/UL (ref 4.2–5.4)
SODIUM BLD-SCNC: 143 MMOL/L (ref 136–145)
SPECIFIC GRAVITY UA: >=1.03 (ref 1–1.03)
TOTAL PROTEIN: 7.5 G/DL (ref 6.6–8.7)
UROBILINOGEN, URINE: 1 E.U./DL
WBC # BLD: 16.2 K/UL (ref 4.8–10.8)

## 2019-05-24 PROCEDURE — 81003 URINALYSIS AUTO W/O SCOPE: CPT

## 2019-05-24 PROCEDURE — 80053 COMPREHEN METABOLIC PANEL: CPT

## 2019-05-24 PROCEDURE — 74177 CT ABD & PELVIS W/CONTRAST: CPT

## 2019-05-24 PROCEDURE — 83690 ASSAY OF LIPASE: CPT

## 2019-05-24 PROCEDURE — 84703 CHORIONIC GONADOTROPIN ASSAY: CPT

## 2019-05-24 PROCEDURE — 96375 TX/PRO/DX INJ NEW DRUG ADDON: CPT

## 2019-05-24 PROCEDURE — 96365 THER/PROPH/DIAG IV INF INIT: CPT

## 2019-05-24 PROCEDURE — 2580000003 HC RX 258: Performed by: EMERGENCY MEDICINE

## 2019-05-24 PROCEDURE — 99284 EMERGENCY DEPT VISIT MOD MDM: CPT

## 2019-05-24 PROCEDURE — 36415 COLL VENOUS BLD VENIPUNCTURE: CPT

## 2019-05-24 PROCEDURE — 85025 COMPLETE CBC W/AUTO DIFF WBC: CPT

## 2019-05-24 PROCEDURE — 6360000002 HC RX W HCPCS: Performed by: EMERGENCY MEDICINE

## 2019-05-24 PROCEDURE — 6360000004 HC RX CONTRAST MEDICATION: Performed by: EMERGENCY MEDICINE

## 2019-05-24 RX ORDER — ONDANSETRON 2 MG/ML
4 INJECTION INTRAMUSCULAR; INTRAVENOUS ONCE
Status: COMPLETED | OUTPATIENT
Start: 2019-05-24 | End: 2019-05-24

## 2019-05-24 RX ORDER — MORPHINE SULFATE 4 MG/ML
4 INJECTION, SOLUTION INTRAMUSCULAR; INTRAVENOUS ONCE
Status: COMPLETED | OUTPATIENT
Start: 2019-05-24 | End: 2019-05-24

## 2019-05-24 RX ORDER — SODIUM CHLORIDE, SODIUM LACTATE, POTASSIUM CHLORIDE, AND CALCIUM CHLORIDE .6; .31; .03; .02 G/100ML; G/100ML; G/100ML; G/100ML
1000 INJECTION, SOLUTION INTRAVENOUS ONCE
Status: COMPLETED | OUTPATIENT
Start: 2019-05-24 | End: 2019-05-24

## 2019-05-24 RX ORDER — PROMETHAZINE HYDROCHLORIDE 25 MG/1
25 TABLET ORAL 4 TIMES DAILY PRN
Qty: 20 TABLET | Refills: 0 | Status: SHIPPED | OUTPATIENT
Start: 2019-05-24 | End: 2019-05-31

## 2019-05-24 RX ADMIN — IOPAMIDOL 90 ML: 755 INJECTION, SOLUTION INTRAVENOUS at 06:46

## 2019-05-24 RX ADMIN — MORPHINE SULFATE 4 MG: 4 INJECTION INTRAVENOUS at 06:36

## 2019-05-24 RX ADMIN — SODIUM CHLORIDE, POTASSIUM CHLORIDE, SODIUM LACTATE AND CALCIUM CHLORIDE 1000 ML: 600; 310; 30; 20 INJECTION, SOLUTION INTRAVENOUS at 06:34

## 2019-05-24 RX ADMIN — ONDANSETRON 4 MG: 2 INJECTION INTRAMUSCULAR; INTRAVENOUS at 06:34

## 2019-05-24 ASSESSMENT — ENCOUNTER SYMPTOMS
APNEA: 0
DIARRHEA: 1
RHINORRHEA: 0
BLOOD IN STOOL: 1
NAUSEA: 1
SHORTNESS OF BREATH: 0
COUGH: 0
COLOR CHANGE: 0
SORE THROAT: 0
EYE DISCHARGE: 0
ABDOMINAL DISTENTION: 0
PHOTOPHOBIA: 0
EYE PAIN: 0
VOMITING: 1
ABDOMINAL PAIN: 1
BACK PAIN: 0

## 2019-05-24 ASSESSMENT — PAIN DESCRIPTION - LOCATION: LOCATION: ABDOMEN

## 2019-05-24 ASSESSMENT — PAIN SCALES - GENERAL
PAINLEVEL_OUTOF10: 2
PAINLEVEL_OUTOF10: 8

## 2019-05-24 ASSESSMENT — PAIN DESCRIPTION - ORIENTATION: ORIENTATION: LOWER;MID

## 2019-05-24 NOTE — ED PROVIDER NOTES
[facility] [place] [facility] EMERGENCY DEPT  eMERGENCYdEPARTMENT eNCOUnter      Pt Name: Kenyatta Hapmton  MRN: [id number]  [place] 1970  Date of evaluation: 5/24/2019  Provider:VINICIO Sarabia    CHIEF COMPLAINT       Chief Complaint   Patient presents with    Abdominal Pain     started around 0200. had diarrhea episodes with some blood in stool. vomited x1.  Emesis    Diarrhea         HISTORY OF PRESENT ILLNESS  (Location/Symptom, Timing/Onset, Context/Setting, Quality, Duration, Modifying Factors, Severity.)   Kenyatta Hampton is a 57 y.o. female who presents to the emergency department with complaints of diarrhea including one episode of geovanny blood on stool. She admits to nausea and emesis ×1. This started at 2 in the morning. Denies fever. Admits to abdominal pain left lower quadrant has resolved upon getting fluids here in ED. 2 benign colonoscopy 2 years ago. Denies any urinary symptoms admits to history of diverticulosis. She admits pain is a 3 out of 10 sharp stabbing in character at worst.      HPI    Nursing Notes were reviewed and I agree. REVIEW OF SYSTEMS    (2-9 systems for level 4, 10 or more for level 5)     Review of Systems   Constitutional: Negative for activity change, appetite change, chills and fever. HENT: Negative for congestion, postnasal drip, rhinorrhea and sore throat. Eyes: Negative for photophobia, pain, discharge and visual disturbance. Respiratory: Negative for apnea, cough and shortness of breath. Cardiovascular: Negative for chest pain and leg swelling. Gastrointestinal: Positive for abdominal pain, blood in stool, diarrhea, nausea and vomiting. Negative for abdominal distention. Genitourinary: Negative for dysuria, flank pain, pelvic pain, vaginal bleeding and vaginal discharge. Musculoskeletal: Negative for arthralgias, back pain, joint swelling, neck pain and neck stiffness. Skin: Negative for color change and rash.    Neurological: Negative for dizziness, tablet by mouth daily    MULTIPLE VITAMINS-MINERALS (THERAPEUTIC MULTIVITAMIN-MINERALS) TABLET    Take 1 tablet by mouth daily    VENLAFAXINE (EFFEXOR XR) 150 MG EXTENDED RELEASE CAPSULE    Take 150 mg by mouth daily 75mg HS       ALLERGIES     Sulfa antibiotics    FAMILY HISTORY       Family History   Problem Relation Age of Onset    Coronary Art Dis Father     Heart Disease Father     Crohn's Disease Mother     Liver Disease Mother         alcohol induced    Coronary Art Dis Other         Family History    Colon Cancer Neg Hx     Colon Polyps Neg Hx     Esophageal Cancer Neg Hx     Liver Cancer Neg Hx     Rectal Cancer Neg Hx     Stomach Cancer Neg Hx           SOCIAL HISTORY       Social History     Socioeconomic History    Marital status:      Spouse name: None    Number of children: None    Years of education: None    Highest education level: None   Occupational History    None   Social Needs    Financial resource strain: None    Food insecurity:     Worry: None     Inability: None    Transportation needs:     Medical: None     Non-medical: None   Tobacco Use    Smoking status: Never Smoker    Smokeless tobacco: Never Used   Substance and Sexual Activity    Alcohol use:  Yes     Alcohol/week: 0.0 oz     Comment: Cassandra Hart once or twice per week    Drug use: No    Sexual activity: Yes     Partners: Male   Lifestyle    Physical activity:     Days per week: None     Minutes per session: None    Stress: None   Relationships    Social connections:     Talks on phone: None     Gets together: None     Attends Alevism service: None     Active member of club or organization: None     Attends meetings of clubs or organizations: None     Relationship status: None    Intimate partner violence:     Fear of current or ex partner: None     Emotionally abused: None     Physically abused: None     Forced sexual activity: None   Other Topics Concern    None   Social History Narrative    None SCREENINGS    Cristobal Coma Scale  Eye Opening: Spontaneous  Best Verbal Response: Oriented  Best Motor Response: Obeys commands  Cristobal Coma Scale Score: 15      PHYSICAL EXAM    (up to 7 forlevel 4, 8 or more for level 5)     ED Triage Vitals   BP Temp Temp Source Pulse Resp SpO2 Height Weight   05/24/19 0558 05/24/19 0558 05/24/19 0558 05/24/19 0558 05/24/19 0558 05/24/19 0558 05/24/19 0554 05/24/19 0554   (!) 98/49 98.3 °F (36.8 °C) Oral 63 18 98 % 5' (1.524 m) 148 lb (67.1 kg)       Physical Exam   Constitutional: She is oriented to person, place, and time. She appears well-developed and well-nourished. No distress. HENT:   Head: Normocephalic and atraumatic. Right Ear: External ear normal.   Left Ear: External ear normal.   Nose: Nose normal.   Mouth/Throat: Oropharynx is clear and moist. No oropharyngeal exudate. Eyes: Pupils are equal, round, and reactive to light. Conjunctivae and EOM are normal. Right eye exhibits no discharge. Left eye exhibits no discharge. Neck: Normal range of motion. Neck supple. No thyromegaly present. Cardiovascular: Normal rate, regular rhythm, normal heart sounds and intact distal pulses. Exam reveals no friction rub. No murmur heard. Pulmonary/Chest: Effort normal and breath sounds normal. No stridor. No respiratory distress. She has no wheezes. Abdominal: Soft. Bowel sounds are normal. She exhibits no distension. There is no tenderness. Genitourinary: Rectal exam shows guaiac negative stool. Genitourinary Comments: Normal rectum no external hemorrhoids observed. I do not palpate any internal hemorrhoids no observation of fissures. Musculoskeletal: Normal range of motion. She exhibits no edema. Neurological: She is alert and oriented to person, place, and time. She displays normal reflexes. No cranial nerve deficit or sensory deficit. She exhibits normal muscle tone. Coordination normal.   Skin: Skin is warm and dry.  Capillary refill takes less than comments: CT is consistent with a colitis plan for discharge and supportive care. Hydration with medicine for nausea vomiting. Patient has no blood in stool here in ED. Medically stable symptoms treated. Plan to follow with PCP should symptoms persist in 3 days for GI referral; no GI on call today. She may return to ED at anytime with new symptoms questions or concerns. PROCEDURES:    Procedures      FINAL IMPRESSION      1. Lower abdominal pain    2.  Colitis          DISPOSITION/PLAN   DISPOSITION        PATIENT REFERRED TO:  Ivet Ovalles, [facility] [address]  [phone]    In 3 days  As needed; if symptoms persist for GI referral    [facility] EMERGENCY DEPT  [facility]  [phone]    If symptoms worsen      DISCHARGE MEDICATIONS:  New Prescriptions    PROMETHAZINE (PHENERGAN) 25 MG TABLET    Take 1 tablet by mouth 4 times daily as needed for Nausea       (Please note that portions of this note were completed with a voice recognition program.  Efforts were made to edit the dictations but occasionallywords are mis-transcribed.)    Diana Alonso [address] [place] [address]  05/24/19 [id number]

## 2019-06-11 ENCOUNTER — OFFICE VISIT (OUTPATIENT)
Dept: GASTROENTEROLOGY | Facility: CLINIC | Age: 49
End: 2019-06-11

## 2019-06-11 VITALS
BODY MASS INDEX: 29.25 KG/M2 | HEIGHT: 60 IN | HEART RATE: 63 BPM | SYSTOLIC BLOOD PRESSURE: 120 MMHG | WEIGHT: 149 LBS | DIASTOLIC BLOOD PRESSURE: 70 MMHG | OXYGEN SATURATION: 99 %

## 2019-06-11 DIAGNOSIS — K52.9 COLITIS: Primary | ICD-10-CM

## 2019-06-11 DIAGNOSIS — Z78.9 NONSMOKER: ICD-10-CM

## 2019-06-11 DIAGNOSIS — E66.9 OBESITY, UNSPECIFIED OBESITY SEVERITY, UNSPECIFIED OBESITY TYPE: ICD-10-CM

## 2019-06-11 PROCEDURE — 99204 OFFICE O/P NEW MOD 45 MIN: CPT | Performed by: CLINICAL NURSE SPECIALIST

## 2019-06-11 RX ORDER — SODIUM, POTASSIUM,MAG SULFATES 17.5-3.13G
SOLUTION, RECONSTITUTED, ORAL ORAL
Qty: 2 BOTTLE | Refills: 0 | Status: ON HOLD | OUTPATIENT
Start: 2019-06-11 | End: 2019-07-01

## 2019-06-11 NOTE — PROGRESS NOTES
Viviana Arzate  1970 6/11/2019  Chief Complaint   Patient presents with   • GI Problem     New patient ref by Dr. JOSE Pfeiffer for Colitis and elevated WBC count     Subjective   HPI  Viviana Arzate is a 48 y.o. female who presents with a compliant of acute onset abdominal pain, diarrhea. She says that the pain was lower abdomen severe cramp sharp and stabbing rated 10/10. She went to the ER on 5/24/2019 and CT showed LLQ colitis.  She was treated with IV fluids and pain medications. She was discharged with phenergan. She says that it took her several days and now she is not going much at all. She says that she slept and hardly ate for approx 3 days and the next week her abdomen was tender but improved gradually. No rectal bleeding. No fever chills or sweats. No wt loss. She has a hx of Cdiff colitis diagnosed in 9/2016 and colonoscopy showed mild active colitis thought infectious by Dr Gracia. Her mother had colon issues however no family hx for colon cancer. She does do ibuprofen for her headaches intermittent at least once or twice per month.    Past Medical History:   Diagnosis Date   • Allergic rhinitis    • Anxiety    • IBS (irritable bowel syndrome)    • MVP (mitral valve prolapse)    • Vitamin D deficiency      History reviewed. No pertinent surgical history.    Outpatient Medications Marked as Taking for the 6/11/19 encounter (Office Visit) with Deepti Cox APRN   Medication Sig Dispense Refill   • acebutolol (SECTRAL) 200 MG capsule Take 200 mg by mouth 2 (Two) Times a Day.     • ALPRAZolam (XANAX) 0.25 MG tablet Take 0.25 mg by mouth Daily As Needed for Anxiety.     • amitriptyline (ELAVIL) 10 MG tablet Take 10 mg by mouth Every Night.     • cyanocobalamin 1000 MCG/ML injection Inject 1,000 mcg into the appropriate muscle as directed by prescriber 1 (One) Time. Every 2 weeks     • fexofenadine-pseudoephedrine (ALLEGRA-D 24) 180-240 MG per 24 hr tablet Take 1 tablet by mouth Daily.     •  ibuprofen (ADVIL,MOTRIN) 200 MG tablet Take 200 mg by mouth Every 6 (Six) Hours As Needed for Mild Pain .     • levonorgestrel (MIRENA, 52 MG,) 20 MCG/24HR IUD 1 each by Intrauterine route 1 (One) Time.     • Mirabegron ER (MYRBETRIQ) 25 MG tablet sustained-release 24 hour 24 hr tablet Take 25 mg by mouth Daily.     • Probiotic Product (PROBIOTIC ADVANCED PO) Take  by mouth Daily.     • venlafaxine (EFFEXOR) 150 MG tablet sustained-release 24 hour 24 hr tablet Take  by mouth Daily.     • vitamin D (ERGOCALCIFEROL) 19535 units capsule capsule Take 50,000 Units by mouth 1 (One) Time Per Week.       Allergies   Allergen Reactions   • Sulfa Antibiotics Other (See Comments)     Social History     Socioeconomic History   • Marital status:      Spouse name: Not on file   • Number of children: Not on file   • Years of education: Not on file   • Highest education level: Not on file   Tobacco Use   • Smoking status: Never Smoker   • Smokeless tobacco: Never Used   Substance and Sexual Activity   • Alcohol use: Yes     Comment: 3- 4 glasses of wine a week     Family History   Problem Relation Age of Onset   • Colon cancer Neg Hx    • Colon polyps Neg Hx      Health Maintenance   Topic Date Due   • ANNUAL PHYSICAL  09/11/1975   • TDAP/TD VACCINES (1 - Tdap) 09/11/1991   • PAP SMEAR  06/10/2019   • INFLUENZA VACCINE  08/01/2019     Review of Systems   Constitutional: Negative for activity change, appetite change, chills, diaphoresis, fatigue, fever and unexpected weight change.   HENT: Negative for ear pain, hearing loss, mouth sores, sore throat, trouble swallowing and voice change.    Eyes: Negative.    Respiratory: Negative for cough, choking, shortness of breath and wheezing.    Cardiovascular: Negative for chest pain and palpitations.   Gastrointestinal: Negative for abdominal pain, blood in stool, constipation, diarrhea, nausea and vomiting.   Endocrine: Negative for cold intolerance and heat intolerance.  "  Genitourinary: Negative for decreased urine volume, dysuria, frequency, hematuria and urgency.   Musculoskeletal: Negative for back pain, gait problem and myalgias.   Skin: Negative for color change, pallor and rash.   Allergic/Immunologic: Negative for food allergies and immunocompromised state.   Neurological: Negative for dizziness, tremors, seizures, syncope, weakness, light-headedness, numbness and headaches.   Hematological: Negative for adenopathy. Does not bruise/bleed easily.   Psychiatric/Behavioral: Negative for agitation and confusion. The patient is not nervous/anxious.    All other systems reviewed and are negative.    Objective   Vitals:    06/11/19 1335   BP: 120/70   Pulse: 63   SpO2: 99%   Weight: 67.6 kg (149 lb)   Height: 152.4 cm (60\")     Body mass index is 29.1 kg/m².  Physical Exam   Constitutional: She is oriented to person, place, and time. She appears well-developed and well-nourished.   HENT:   Head: Normocephalic and atraumatic.   Eyes: Pupils are equal, round, and reactive to light.   Neck: Normal range of motion. Neck supple. No tracheal deviation present.   Cardiovascular: Normal rate, regular rhythm and normal heart sounds. Exam reveals no gallop and no friction rub.   No murmur heard.  Pulmonary/Chest: Effort normal and breath sounds normal. No respiratory distress. She has no wheezes. She has no rales. She exhibits no tenderness.   Abdominal: Soft. Bowel sounds are normal. She exhibits no distension. There is no hepatosplenomegaly. There is no tenderness. There is no rigidity, no rebound and no guarding.   Musculoskeletal: Normal range of motion. She exhibits no edema, tenderness or deformity.   Neurological: She is alert and oriented to person, place, and time. She has normal reflexes.   Skin: Skin is warm and dry. No rash noted. No pallor.   Psychiatric: She has a normal mood and affect. Her behavior is normal. Judgment and thought content normal.     Assessment/Plan   Lorry " was seen today for gi problem.    Diagnoses and all orders for this visit:    Colitis  Comments:  per CT they did not do stool culture during diarrhea illness  Orders:  -     Case Request; Standing  -     Follow Anesthesia Guidelines / Standing Orders; Future  -     Obtain Informed Consent; Future  -     Implement Anesthesia Orders Day of Procedure; Standing  -     Obtain Informed Consent; Standing  -     Verify bowel prep was successful; Standing  -     Case Request  -     SUPREP BOWEL PREP KIT 17.5-3.13-1.6 GM/177ML solution oral solution; Take as directed by office instructions provided    Nonsmoker    Obesity, unspecified obesity severity, unspecified obesity type      COLONOSCOPY WITH ANESTHESIA (N/A)  EMR Dragon/transcription disclaimer: Much of this encounter note is electronic transcription/translation of spoken language to printed text. The electronic translation of spoken language may be erroneous, or at times, nonsensical words or phrases may be inadvertently transcribed. Although I have reviewed the note for such errors, some may still exist.  Body mass index is 29.1 kg/m².  No Follow-up on file.    Patient's Body mass index is 29.1 kg/m². BMI is above normal parameters. Recommendations include: nutrition counseling.      All risks, benefits, alternatives, and indications of colonoscopy and/or Endoscopy procedure have been discussed with the patient. Risks to include perforation of the colon requiring possible surgery or colostomy, risk of bleeding from biopsies or removal of colon tissue, possibility of missing a colon polyp or cancer, or adverse drug reaction.  Benefits to include the diagnosis and management of disease of the colon and rectum. Alternatives to include barium enema, radiographic evaluation, lab testing or no intervention. Pt verbalizes understanding and agrees.     Deepti Cox, APRN  6/11/2019  2:27 PM      Obesity, Adult  Obesity is the condition of having too much total body  fat. Being overweight or obese means that your weight is greater than what is considered healthy for your body size. Obesity is determined by a measurement called BMI. BMI is an estimate of body fat and is calculated from height and weight. For adults, a BMI of 30 or higher is considered obese.  Obesity can eventually lead to other health concerns and major illnesses, including:  · Stroke.  · Coronary artery disease (CAD).  · Type 2 diabetes.  · Some types of cancer, including cancers of the colon, breast, uterus, and gallbladder.  · Osteoarthritis.  · High blood pressure (hypertension).  · High cholesterol.  · Sleep apnea.  · Gallbladder stones.  · Infertility problems.  What are the causes?  The main cause of obesity is taking in (consuming) more calories than your body uses for energy. Other factors that contribute to this condition may include:  · Being born with genes that make you more likely to become obese.  · Having a medical condition that causes obesity. These conditions include:  ¨ Hypothyroidism.  ¨ Polycystic ovarian syndrome (PCOS).  ¨ Binge-eating disorder.  ¨ Cushing syndrome.  · Taking certain medicines, such as steroids, antidepressants, and seizure medicines.  · Not being physically active (sedentary lifestyle).  · Living where there are limited places to exercise safely or buy healthy foods.  · Not getting enough sleep.  What increases the risk?  The following factors may increase your risk of this condition:  · Having a family history of obesity.  · Being a woman of -American descent.  · Being a man of  descent.  What are the signs or symptoms?  Having excessive body fat is the main symptom of this condition.  How is this diagnosed?  This condition may be diagnosed based on:  · Your symptoms.  · Your medical history.  · A physical exam. Your health care provider may measure:  ¨ Your BMI. If you are an adult with a BMI between 25 and less than 30, you are considered overweight. If  you are an adult with a BMI of 30 or higher, you are considered obese.  ¨ The distances around your hips and your waist (circumferences). These may be compared to each other to help diagnose your condition.  ¨ Your skinfold thickness. Your health care provider may gently pinch a fold of your skin and measure it.  How is this treated?  Treatment for this condition often includes changing your lifestyle. Treatment may include some or all of the following:  · Dietary changes. Work with your health care provider and a dietitian to set a weight-loss goal that is healthy and reasonable for you. Dietary changes may include eating:  ¨ Smaller portions. A portion size is the amount of a particular food that is healthy for you to eat at one time. This varies from person to person.  ¨ Low-calorie or low-fat options.  ¨ More whole grains, fruits, and vegetables.  · Regular physical activity. This may include aerobic activity (cardio) and strength training.  · Medicine to help you lose weight. Your health care provider may prescribe medicine if you are unable to lose 1 pound a week after 6 weeks of eating more healthily and doing more physical activity.  · Surgery. Surgical options may include gastric banding and gastric bypass. Surgery may be done if:  ¨ Other treatments have not helped to improve your condition.  ¨ You have a BMI of 40 or higher.  ¨ You have life-threatening health problems related to obesity.  Follow these instructions at home:     Eating and drinking     · Follow recommendations from your health care provider about what you eat and drink. Your health care provider may advise you to:  ¨ Limit fast foods, sweets, and processed snack foods.  ¨ Choose low-fat options, such as low-fat milk instead of whole milk.  ¨ Eat 5 or more servings of fruits or vegetables every day.  ¨ Eat at home more often. This gives you more control over what you eat.  ¨ Choose healthy foods when you eat out.  ¨ Learn what a healthy  portion size is.  ¨ Keep low-fat snacks on hand.  ¨ Avoid sugary drinks, such as soda, fruit juice, iced tea sweetened with sugar, and flavored milk.  ¨ Eat a healthy breakfast.  · Drink enough water to keep your urine clear or pale yellow.  · Do not go without eating for long periods of time (do not fast) or follow a fad diet. Fasting and fad diets can be unhealthy and even dangerous.  Physical Activity   · Exercise regularly, as told by your health care provider. Ask your health care provider what types of exercise are safe for you and how often you should exercise.  · Warm up and stretch before being active.  · Cool down and stretch after being active.  · Rest between periods of activity.  Lifestyle   · Limit the time that you spend in front of your TV, computer, or video game system.  · Find ways to reward yourself that do not involve food.  · Limit alcohol intake to no more than 1 drink a day for nonpregnant women and 2 drinks a day for men. One drink equals 12 oz of beer, 5 oz of wine, or 1½ oz of hard liquor.  General instructions   · Keep a weight loss journal to keep track of the food you eat and how much you exercise you get.  · Take over-the-counter and prescription medicines only as told by your health care provider.  · Take vitamins and supplements only as told by your health care provider.  · Consider joining a support group. Your health care provider may be able to recommend a support group.  · Keep all follow-up visits as told by your health care provider. This is important.  Contact a health care provider if:  · You are unable to meet your weight loss goal after 6 weeks of dietary and lifestyle changes.  This information is not intended to replace advice given to you by your health care provider. Make sure you discuss any questions you have with your health care provider.  Document Released: 01/25/2006 Document Revised: 05/22/2017 Document Reviewed: 10/05/2016  [employer] Interactive Patient Education  © 2017 [employer]      If you smoke or use tobacco, 4 minutes reading provided  Steps to Quit Smoking  Smoking tobacco can be harmful to your health and can affect almost every organ in your body. Smoking puts you, and those around you, at risk for developing many serious chronic diseases. Quitting smoking is difficult, but it is one of the best things that you can do for your health. It is never too late to quit.  What are the benefits of quitting smoking?  When you quit smoking, you lower your risk of developing serious diseases and conditions, such as:  · Lung cancer or lung disease, such as COPD.  · Heart disease.  · Stroke.  · Heart attack.  · Infertility.  · Osteoporosis and bone fractures.  Additionally, symptoms such as coughing, wheezing, and shortness of breath may get better when you quit. You may also find that you get sick less often because your body is stronger at fighting off colds and infections. If you are pregnant, quitting smoking can help to reduce your chances of having a baby of low birth weight.  How do I get ready to quit?  When you decide to quit smoking, create a plan to make sure that you are successful. Before you quit:  · Pick a date to quit. Set a date within the next two weeks to give you time to prepare.  · Write down the reasons why you are quitting. Keep this list in places where you will see it often, such as on your bathroom mirror or in your car or wallet.  · Identify the people, places, things, and activities that make you want to smoke (triggers) and avoid them. Make sure to take these actions:  ¨ Throw away all cigarettes at home, at work, and in your car.  ¨ Throw away smoking accessories, such as ashtrays and lighters.  ¨ Clean your car and make sure to empty the ashtray.  ¨ Clean your home, including curtains and carpets.  · Tell your family, friends, and coworkers that you are quitting. Support from your loved ones can make quitting easier.  · Talk with your health  care provider about your options for quitting smoking.  · Find out what treatment options are covered by your health insurance.  What strategies can I use to quit smoking?  Talk with your healthcare provider about different strategies to quit smoking. Some strategies include:  · Quitting smoking altogether instead of gradually lessening how much you smoke over a period of time. Research shows that quitting “cold turkey” is more successful than gradually quitting.  · Attending in-person counseling to help you build problem-solving skills. You are more likely to have success in quitting if you attend several counseling sessions. Even short sessions of 10 minutes can be effective.  · Finding resources and support systems that can help you to quit smoking and remain smoke-free after you quit. These resources are most helpful when you use them often. They can include:  ¨ Online chats with a counselor.  ¨ Telephone quitlines.  ¨ Printed self-help materials.  ¨ Support groups or group counseling.  ¨ Text messaging programs.  ¨ Mobile phone applications.  · Taking medicines to help you quit smoking. (If you are pregnant or breastfeeding, talk with your health care provider first.) Some medicines contain nicotine and some do not. Both types of medicines help with cravings, but the medicines that include nicotine help to relieve withdrawal symptoms. Your health care provider may recommend:  ¨ Nicotine patches, gum, or lozenges.  ¨ Nicotine inhalers or sprays.  ¨ Non-nicotine medicine that is taken by mouth.  Talk with your health care provider about combining strategies, such as taking medicines while you are also receiving in-person counseling. Using these two strategies together makes you more likely to succeed in quitting than if you used either strategy on its own.  If you are pregnant or breastfeeding, talk with your health care provider about finding counseling or other support strategies to quit smoking. Do not take  medicine to help you quit smoking unless told to do so by your health care provider.  What things can I do to make it easier to quit?  Quitting smoking might feel overwhelming at first, but there is a lot that you can do to make it easier. Take these important actions:  · Reach out to your family and friends and ask that they support and encourage you during this time. Call telephone quitlines, reach out to support groups, or work with a counselor for support.  · Ask people who smoke to avoid smoking around you.  · Avoid places that trigger you to smoke, such as bars, parties, or smoke-break areas at work.  · Spend time around people who do not smoke.  · Lessen stress in your life, because stress can be a smoking trigger for some people. To lessen stress, try:  ¨ Exercising regularly.  ¨ Deep-breathing exercises.  ¨ Yoga.  ¨ Meditating.  ¨ Performing a body scan. This involves closing your eyes, scanning your body from head to toe, and noticing which parts of your body are particularly tense. Purposefully relax the muscles in those areas.  · Download or purchase mobile phone or tablet apps (applications) that can help you stick to your quit plan by providing reminders, tips, and encouragement. There are many free apps, such as QuitGuide from the CDC (Centers for Disease Control and Prevention). You can find other support for quitting smoking (smoking cessation) through smokefree.gov and other websites.  How will I feel when I quit smoking?  Within the first 24 hours of quitting smoking, you may start to feel some withdrawal symptoms. These symptoms are usually most noticeable 2-3 days after quitting, but they usually do not last beyond 2-3 weeks. Changes or symptoms that you might experience include:  · Mood swings.  · Restlessness, anxiety, or irritation.  · Difficulty concentrating.  · Dizziness.  · Strong cravings for sugary foods in addition to nicotine.  · Mild weight  gain.  · Constipation.  · Nausea.  · Coughing or a sore throat.  · Changes in how your medicines work in your body.  · A depressed mood.  · Difficulty sleeping (insomnia).  After the first 2-3 weeks of quitting, you may start to notice more positive results, such as:  · Improved sense of smell and taste.  · Decreased coughing and sore throat.  · Slower heart rate.  · Lower blood pressure.  · Clearer skin.  · The ability to breathe more easily.  · Fewer sick days.  Quitting smoking is very challenging for most people. Do not get discouraged if you are not successful the first time. Some people need to make many attempts to quit before they achieve long-term success. Do your best to stick to your quit plan, and talk with your health care provider if you have any questions or concerns.  This information is not intended to replace advice given to you by your health care provider. Make sure you discuss any questions you have with your health care provider.  Document Released: 12/12/2002 Document Revised: 08/15/2017 Document Reviewed: 05/03/2016  [employer] Interactive Patient Education © 2017 [employer]

## 2019-06-20 ENCOUNTER — TELEPHONE (OUTPATIENT)
Dept: GASTROENTEROLOGY | Facility: CLINIC | Age: 49
End: 2019-06-20

## 2019-06-20 DIAGNOSIS — R19.7 DIARRHEA IN ADULT PATIENT: Primary | ICD-10-CM

## 2019-06-20 NOTE — TELEPHONE ENCOUNTER
With colitis we dont want to put a scope in usually until things have resolved for at least 6 weeks due to risk of perforation. She was getting better when I saw her on 6/11, no diarrhea at that time therefore I didn't culture her. If symptom of diarrhea is back lets get cultures and see if there is anything we need to treat/rule out infectious colitis. She has a hx of Cdiff as well.   Thanks Deepti Cox

## 2019-06-20 NOTE — TELEPHONE ENCOUNTER
Spoke with patient and told her we need to get stool studies and see what they show and she agreed to come to [facility] lab and  containers.

## 2019-06-20 NOTE — TELEPHONE ENCOUNTER
Patient called stating she wants her colonoscopy moved up she has colitis and is having more and more diarrhea and isn't scheduled for her colon until September wants to have it sooner.  [phone]

## 2019-06-27 ENCOUNTER — TELEPHONE (OUTPATIENT)
Dept: GASTROENTEROLOGY | Facility: CLINIC | Age: 49
End: 2019-06-27

## 2019-06-27 ENCOUNTER — LAB (OUTPATIENT)
Dept: LAB | Facility: HOSPITAL | Age: 49
End: 2019-06-27

## 2019-06-27 DIAGNOSIS — R19.7 DIARRHEA IN ADULT PATIENT: ICD-10-CM

## 2019-06-27 LAB
ADV 40+41 DNA STL QL NAA+NON-PROBE: NOT DETECTED
ASTRO TYP 1-8 RNA STL QL NAA+NON-PROBE: NOT DETECTED
C CAYETANENSIS DNA STL QL NAA+NON-PROBE: NOT DETECTED
C DIFF TOX GENS STL QL NAA+PROBE: NOT DETECTED
CAMPY SP DNA.DIARRHEA STL QL NAA+PROBE: NOT DETECTED
CRYPTOSP STL CULT: NOT DETECTED
E COLI DNA SPEC QL NAA+PROBE: NOT DETECTED
E HISTOLYT AG STL-ACNC: NOT DETECTED
EAEC PAA PLAS AGGR+AATA ST NAA+NON-PRB: NOT DETECTED
EC STX1 + STX2 GENES STL NAA+PROBE: NOT DETECTED
EPEC EAE GENE STL QL NAA+NON-PROBE: NOT DETECTED
ETEC LTA+ST1A+ST1B TOX ST NAA+NON-PROBE: NOT DETECTED
G LAMBLIA DNA SPEC QL NAA+PROBE: NOT DETECTED
NOROVIRUS GI+II RNA STL QL NAA+NON-PROBE: NOT DETECTED
P SHIGELLOIDES DNA STL QL NAA+NON-PROBE: NOT DETECTED
RV RNA STL NAA+PROBE: NOT DETECTED
SALMONELLA DNA SPEC QL NAA+PROBE: NOT DETECTED
SAPO I+II+IV+V RNA STL QL NAA+NON-PROBE: NOT DETECTED
SHIGELLA SP+EIEC IPAH STL QL NAA+PROBE: NOT DETECTED
V CHOLERAE DNA SPEC QL NAA+PROBE: NOT DETECTED
VIBRIO DNA SPEC NAA+PROBE: NOT DETECTED
YERSINIA STL CULT: NOT DETECTED

## 2019-06-27 PROCEDURE — 87507 IADNA-DNA/RNA PROBE TQ 12-25: CPT | Performed by: CLINICAL NURSE SPECIALIST

## 2019-06-27 PROCEDURE — 83993 ASSAY FOR CALPROTECTIN FECAL: CPT | Performed by: CLINICAL NURSE SPECIALIST

## 2019-06-27 PROCEDURE — 87205 SMEAR GRAM STAIN: CPT | Performed by: CLINICAL NURSE SPECIALIST

## 2019-06-27 NOTE — TELEPHONE ENCOUNTER
Spoke with patient and let her know to expect a call from Yasmine to schedule. She voiced understanding.

## 2019-06-27 NOTE — TELEPHONE ENCOUNTER
Have Dr Peña do her colonoscopy sooner she had colitis per CT scan stool cultures were negative. Thanks haylee CABRERA

## 2019-06-28 LAB — WBC STL QL MICRO: NORMAL

## 2019-07-01 ENCOUNTER — ANESTHESIA (OUTPATIENT)
Dept: GASTROENTEROLOGY | Facility: HOSPITAL | Age: 49
End: 2019-07-01

## 2019-07-01 ENCOUNTER — ANESTHESIA EVENT (OUTPATIENT)
Dept: GASTROENTEROLOGY | Facility: HOSPITAL | Age: 49
End: 2019-07-01

## 2019-07-01 ENCOUNTER — HOSPITAL ENCOUNTER (OUTPATIENT)
Facility: HOSPITAL | Age: 49
Setting detail: HOSPITAL OUTPATIENT SURGERY
Discharge: HOME | End: 2019-07-01
Attending: INTERNAL MEDICINE | Admitting: INTERNAL MEDICINE

## 2019-07-01 VITALS
BODY MASS INDEX: 25.76 KG/M2 | DIASTOLIC BLOOD PRESSURE: 58 MMHG | WEIGHT: 140 LBS | HEIGHT: 62 IN | SYSTOLIC BLOOD PRESSURE: 99 MMHG | TEMPERATURE: 97.4 F | HEART RATE: 75 BPM | RESPIRATION RATE: 14 BRPM | OXYGEN SATURATION: 99 %

## 2019-07-01 DIAGNOSIS — K52.9 COLITIS: ICD-10-CM

## 2019-07-01 LAB
B-HCG UR QL: NEGATIVE
CALPROTECTIN STL-MCNT: <16 UG/G (ref 0–120)

## 2019-07-01 PROCEDURE — 45380 COLONOSCOPY AND BIOPSY: CPT | Performed by: INTERNAL MEDICINE

## 2019-07-01 PROCEDURE — 88305 TISSUE EXAM BY PATHOLOGIST: CPT | Performed by: INTERNAL MEDICINE

## 2019-07-01 PROCEDURE — 25010000002 PROPOFOL 10 MG/ML EMULSION: Performed by: NURSE ANESTHETIST, CERTIFIED REGISTERED

## 2019-07-01 PROCEDURE — 81025 URINE PREGNANCY TEST: CPT | Performed by: ANESTHESIOLOGY

## 2019-07-01 RX ORDER — SODIUM CHLORIDE 0.9 % (FLUSH) 0.9 %
3 SYRINGE (ML) INJECTION EVERY 12 HOURS SCHEDULED
Status: DISCONTINUED | OUTPATIENT
Start: 2019-07-01 | End: 2019-07-01 | Stop reason: HOSPADM

## 2019-07-01 RX ORDER — SODIUM CHLORIDE 0.9 % (FLUSH) 0.9 %
3-10 SYRINGE (ML) INJECTION AS NEEDED
Status: DISCONTINUED | OUTPATIENT
Start: 2019-07-01 | End: 2019-07-01 | Stop reason: HOSPADM

## 2019-07-01 RX ORDER — SODIUM CHLORIDE 9 MG/ML
100 INJECTION, SOLUTION INTRAVENOUS CONTINUOUS
Status: DISCONTINUED | OUTPATIENT
Start: 2019-07-01 | End: 2019-07-01 | Stop reason: HOSPADM

## 2019-07-01 RX ORDER — PROPOFOL 10 MG/ML
VIAL (ML) INTRAVENOUS AS NEEDED
Status: DISCONTINUED | OUTPATIENT
Start: 2019-07-01 | End: 2019-07-01 | Stop reason: SURG

## 2019-07-01 RX ADMIN — PROPOFOL 100 MG: 10 INJECTION, EMULSION INTRAVENOUS at 12:58

## 2019-07-01 RX ADMIN — PROPOFOL 100 MG: 10 INJECTION, EMULSION INTRAVENOUS at 13:02

## 2019-07-01 RX ADMIN — PROPOFOL 100 MG: 10 INJECTION, EMULSION INTRAVENOUS at 12:55

## 2019-07-01 RX ADMIN — SODIUM CHLORIDE 100 ML/HR: 9 INJECTION, SOLUTION INTRAVENOUS at 11:59

## 2019-07-01 RX ADMIN — PROPOFOL 100 MG: 10 INJECTION, EMULSION INTRAVENOUS at 13:10

## 2019-07-01 RX ADMIN — PROPOFOL 100 MG: 10 INJECTION, EMULSION INTRAVENOUS at 12:51

## 2019-07-01 RX ADMIN — PROPOFOL 100 MG: 10 INJECTION, EMULSION INTRAVENOUS at 13:06

## 2019-07-01 NOTE — ANESTHESIA POSTPROCEDURE EVALUATION
Patient: Viviana Arzate    Procedure Summary     Date:  07/01/19 Room / Location:  [facility] ENDOSCOPY 2 /  [facility]    Anesthesia Start:  1244 Anesthesia Stop:  1312    Procedure:  COLONOSCOPY WITH ANESTHESIA (N/A ) Diagnosis:       Colitis      (Colitis [K52.9])    Surgeon:  Danyell Peña MD Provider:  John Almanza CRNA    Anesthesia Type:  MAC ASA Status:  2          Anesthesia Type: MAC  Last vitals  BP   98/68 (07/01/19 1134)   Temp   97.4 °F (36.3 °C) (07/01/19 1134)   Pulse   62 (07/01/19 1134)   Resp   18 (07/01/19 1134)     SpO2   99 % (07/01/19 1134)     Post Anesthesia Care and Evaluation    Patient location during evaluation: PHASE II  Patient participation: complete - patient participated  Level of consciousness: awake and alert  Pain management: adequate  Airway patency: patent  Anesthetic complications: No anesthetic complications  PONV Status: none  Cardiovascular status: acceptable and stable  Respiratory status: acceptable and unassisted  Hydration status: acceptable

## 2019-07-01 NOTE — ANESTHESIA PREPROCEDURE EVALUATION
Anesthesia Evaluation     no history of anesthetic complications:  NPO Solid Status: > 8 hours  NPO Liquid Status: > 2 hours           Airway   Mallampati: I  TM distance: >3 FB  Neck ROM: full  Dental          Pulmonary - normal exam    breath sounds clear to auscultation  (-) asthma, recent URI, sleep apnea, not a smoker  Cardiovascular - normal exam  Exercise tolerance: good (4-7 METS)    Rhythm: regular  Rate: normal    (+) valvular problems/murmurs (Diagnosed 8 yrs ago, asymptomatic) MVP,   (-) pacemaker, hypertension, past MI, angina, cardiac stents, CABG      Neuro/Psych  (+) psychiatric history Anxiety,     (-) seizures, TIA, CVA  GI/Hepatic/Renal/Endo    (-) GERD, liver disease, no renal disease, diabetes, hypothyroidism, hyperthyroidism    Musculoskeletal     Abdominal    Substance History      OB/GYN          Other                        Anesthesia Plan    ASA 2     MAC     intravenous induction   Anesthetic plan, all risks, benefits, and alternatives have been provided, discussed and informed consent has been obtained with: patient.

## 2019-07-02 LAB
CYTO UR: NORMAL
LAB AP CASE REPORT: NORMAL
PATH REPORT.FINAL DX SPEC: NORMAL
PATH REPORT.GROSS SPEC: NORMAL

## 2019-10-22 ENCOUNTER — OFFICE VISIT (OUTPATIENT)
Dept: GASTROENTEROLOGY | Facility: CLINIC | Age: 49
End: 2019-10-22

## 2019-10-22 VITALS
DIASTOLIC BLOOD PRESSURE: 70 MMHG | OXYGEN SATURATION: 99 % | HEIGHT: 60 IN | WEIGHT: 146 LBS | BODY MASS INDEX: 28.66 KG/M2 | HEART RATE: 74 BPM | SYSTOLIC BLOOD PRESSURE: 122 MMHG

## 2019-10-22 DIAGNOSIS — Z78.9 NONSMOKER: ICD-10-CM

## 2019-10-22 DIAGNOSIS — R19.7 DIARRHEA IN ADULT PATIENT: Primary | ICD-10-CM

## 2019-10-22 DIAGNOSIS — E66.9 OBESITY, UNSPECIFIED OBESITY SEVERITY, UNSPECIFIED OBESITY TYPE: ICD-10-CM

## 2019-10-22 PROCEDURE — 99212 OFFICE O/P EST SF 10 MIN: CPT | Performed by: CLINICAL NURSE SPECIALIST

## 2019-10-22 NOTE — PROGRESS NOTES
Viviana Arzate  1970      10/22/2019  Chief Complaint   Patient presents with   • GI Problem     Here to discuss Colitis         HPI    Viviana Arzate is a  49 y.o. female here for a follow up visit for ***    Past Medical History:   Diagnosis Date   • Allergic rhinitis    • Anxiety    • IBS (irritable bowel syndrome)    • MVP (mitral valve prolapse)    • Vitamin D deficiency      Past Surgical History:   Procedure Laterality Date   • COLONOSCOPY     • COLONOSCOPY N/A 7/1/2019    Negative for colitis       Outpatient Medications Marked as Taking for the 10/22/19 encounter (Office Visit) with Deepti Cox APRN   Medication Sig Dispense Refill   • acebutolol (SECTRAL) 200 MG capsule Take 200 mg by mouth 2 (Two) Times a Day.     • ALPRAZolam (XANAX) 0.25 MG tablet Take 0.25 mg by mouth Daily As Needed for Anxiety.     • amitriptyline (ELAVIL) 10 MG tablet Take 10 mg by mouth Every Night.     • cyanocobalamin 1000 MCG/ML injection Inject 1,000 mcg into the appropriate muscle as directed by prescriber 1 (One) Time. Every 2 weeks     • fexofenadine-pseudoephedrine (ALLEGRA-D 24) 180-240 MG per 24 hr tablet Take 1 tablet by mouth Daily.     • ibuprofen (ADVIL,MOTRIN) 200 MG tablet Take 200 mg by mouth Every 6 (Six) Hours As Needed for Mild Pain .     • levonorgestrel (MIRENA, 52 MG,) 20 MCG/24HR IUD 1 each by Intrauterine route 1 (One) Time.     • Mirabegron ER (MYRBETRIQ) 25 MG tablet sustained-release 24 hour 24 hr tablet Take 25 mg by mouth Daily.     • Probiotic Product (PROBIOTIC ADVANCED PO) Take  by mouth Daily.     • venlafaxine (EFFEXOR) 150 MG tablet sustained-release 24 hour 24 hr tablet Take  by mouth Daily.     • vitamin D (ERGOCALCIFEROL) 45767 units capsule capsule Take 50,000 Units by mouth 1 (One) Time Per Week.         Allergies   Allergen Reactions   • Sulfa Antibiotics Other (See Comments)       Social History     Socioeconomic History   • Marital status:      Spouse name: Not on file    "  • Number of children: Not on file   • Years of education: Not on file   • Highest education level: Not on file   Tobacco Use   • Smoking status: Never Smoker   • Smokeless tobacco: Never Used   Substance and Sexual Activity   • Alcohol use: Yes     Comment: 3- 4 glasses of wine a week   • Drug use: No   • Sexual activity: Defer       Family History   Problem Relation Age of Onset   • Colon cancer Neg Hx    • Colon polyps Neg Hx        Review of Systems   Constitutional: Negative for activity change, appetite change, chills, diaphoresis, fatigue, fever and unexpected weight change.   HENT: Negative for ear pain, hearing loss, mouth sores, sore throat, trouble swallowing and voice change.    Eyes: Negative.    Respiratory: Negative for cough, choking, shortness of breath and wheezing.    Cardiovascular: Negative for chest pain and palpitations.   Gastrointestinal: Negative for abdominal pain, blood in stool, constipation, diarrhea, nausea and vomiting.   Endocrine: Negative for cold intolerance and heat intolerance.   Genitourinary: Negative for decreased urine volume, dysuria, frequency, hematuria and urgency.   Musculoskeletal: Negative for back pain, gait problem and myalgias.   Skin: Negative for color change, pallor and rash.   Allergic/Immunologic: Negative for food allergies and immunocompromised state.   Neurological: Negative for dizziness, tremors, seizures, syncope, weakness, light-headedness, numbness and headaches.   Hematological: Negative for adenopathy. Does not bruise/bleed easily.   Psychiatric/Behavioral: Negative for agitation and confusion. The patient is not nervous/anxious.    All other systems reviewed and are negative.      /70   Pulse 74   Ht 152.4 cm (60\")   Wt 66.2 kg (146 lb)   SpO2 99%   Breastfeeding? No   BMI 28.51 kg/m²   Body mass index is 28.51 kg/m².    Physical Exam    ASSESSMENT AND PLAN    Patient's Body mass index is 28.51 kg/m². BMI is above normal parameters. " Recommendations include: nutrition counseling.    Viviana was seen today for gi problem.    Diagnoses and all orders for this visit:    Diarrhea in adult patient    Nonsmoker    Obesity, unspecified obesity severity, unspecified obesity type      * Surgery not found *    There are no Patient Instructions on file for this visit.  Deepti Cox, NIKOLAS  9:09 AM  10/22/2019    Obesity, Adult  Obesity is the condition of having too much total body fat. Being overweight or obese means that your weight is greater than what is considered healthy for your body size. Obesity is determined by a measurement called BMI. BMI is an estimate of body fat and is calculated from height and weight. For adults, a BMI of 30 or higher is considered obese.  Obesity can eventually lead to other health concerns and major illnesses, including:  · Stroke.  · Coronary artery disease (CAD).  · Type 2 diabetes.  · Some types of cancer, including cancers of the colon, breast, uterus, and gallbladder.  · Osteoarthritis.  · High blood pressure (hypertension).  · High cholesterol.  · Sleep apnea.  · Gallbladder stones.  · Infertility problems.  What are the causes?  The main cause of obesity is taking in (consuming) more calories than your body uses for energy. Other factors that contribute to this condition may include:  · Being born with genes that make you more likely to become obese.  · Having a medical condition that causes obesity. These conditions include:  ¨ Hypothyroidism.  ¨ Polycystic ovarian syndrome (PCOS).  ¨ Binge-eating disorder.  ¨ Cushing syndrome.  · Taking certain medicines, such as steroids, antidepressants, and seizure medicines.  · Not being physically active (sedentary lifestyle).  · Living where there are limited places to exercise safely or buy healthy foods.  · Not getting enough sleep.  What increases the risk?  The following factors may increase your risk of this condition:  · Having a family history of  obesity.  · Being a woman of -American descent.  · Being a man of  descent.  What are the signs or symptoms?  Having excessive body fat is the main symptom of this condition.  How is this diagnosed?  This condition may be diagnosed based on:  · Your symptoms.  · Your medical history.  · A physical exam. Your health care provider may measure:  ¨ Your BMI. If you are an adult with a BMI between 25 and less than 30, you are considered overweight. If you are an adult with a BMI of 30 or higher, you are considered obese.  ¨ The distances around your hips and your waist (circumferences). These may be compared to each other to help diagnose your condition.  ¨ Your skinfold thickness. Your health care provider may gently pinch a fold of your skin and measure it.  How is this treated?  Treatment for this condition often includes changing your lifestyle. Treatment may include some or all of the following:  · Dietary changes. Work with your health care provider and a dietitian to set a weight-loss goal that is healthy and reasonable for you. Dietary changes may include eating:  ¨ Smaller portions. A portion size is the amount of a particular food that is healthy for you to eat at one time. This varies from person to person.  ¨ Low-calorie or low-fat options.  ¨ More whole grains, fruits, and vegetables.  · Regular physical activity. This may include aerobic activity (cardio) and strength training.  · Medicine to help you lose weight. Your health care provider may prescribe medicine if you are unable to lose 1 pound a week after 6 weeks of eating more healthily and doing more physical activity.  · Surgery. Surgical options may include gastric banding and gastric bypass. Surgery may be done if:  ¨ Other treatments have not helped to improve your condition.  ¨ You have a BMI of 40 or higher.  ¨ You have life-threatening health problems related to obesity.  Follow these instructions at home:     Eating and drinking      · Follow recommendations from your health care provider about what you eat and drink. Your health care provider may advise you to:  ¨ Limit fast foods, sweets, and processed snack foods.  ¨ Choose low-fat options, such as low-fat milk instead of whole milk.  ¨ Eat 5 or more servings of fruits or vegetables every day.  ¨ Eat at home more often. This gives you more control over what you eat.  ¨ Choose healthy foods when you eat out.  ¨ Learn what a healthy portion size is.  ¨ Keep low-fat snacks on hand.  ¨ Avoid sugary drinks, such as soda, fruit juice, iced tea sweetened with sugar, and flavored milk.  ¨ Eat a healthy breakfast.  · Drink enough water to keep your urine clear or pale yellow.  · Do not go without eating for long periods of time (do not fast) or follow a fad diet. Fasting and fad diets can be unhealthy and even dangerous.  Physical Activity   · Exercise regularly, as told by your health care provider. Ask your health care provider what types of exercise are safe for you and how often you should exercise.  · Warm up and stretch before being active.  · Cool down and stretch after being active.  · Rest between periods of activity.  Lifestyle   · Limit the time that you spend in front of your TV, computer, or video game system.  · Find ways to reward yourself that do not involve food.  · Limit alcohol intake to no more than 1 drink a day for nonpregnant women and 2 drinks a day for men. One drink equals 12 oz of beer, 5 oz of wine, or 1½ oz of hard liquor.  General instructions   · Keep a weight loss journal to keep track of the food you eat and how much you exercise you get.  · Take over-the-counter and prescription medicines only as told by your health care provider.  · Take vitamins and supplements only as told by your health care provider.  · Consider joining a support group. Your health care provider may be able to recommend a support group.  · Keep all follow-up visits as told by your health care  provider. This is important.  Contact a health care provider if:  · You are unable to meet your weight loss goal after 6 weeks of dietary and lifestyle changes.  This information is not intended to replace advice given to you by your health care provider. Make sure you discuss any questions you have with your health care provider.  Document Released: 01/25/2006 Document Revised: 05/22/2017 Document Reviewed: 10/05/2016  [employer] Interactive Patient Education © 2017 [employer]      IF YOU SMOKE OR USE TOBACCO PLEASE READ THE FOLLOWING:    Why is smoking bad for me?  Smoking increases the risk of heart disease, lung disease, vascular disease, stroke, and cancer.     If you smoke, STOP!    If you would like more information on quitting smoking, please visit the [employer] website: [email or web address]   This link will provide additional resources including the QUIT line and the Beat the Pack support groups.     For more information:    Quit Now [place]  1-800-QUIT-NOW  [email or web address]

## 2019-10-22 NOTE — PROGRESS NOTES
Viviana Arzate  1970      10/22/2019  Chief Complaint   Patient presents with   • GI Problem     Here to discuss Colitis         HPI    Viviana Arzate is a  49 y.o. female here for a follow up visit for colitis  Acute onset of diarrhea cramp stabbing lower abdomen. She has had workup to include a CT scan 5/24/2019 showing LLQ colitis. Her colonoscopy showing a decreased mucosa vascular pattern in the recto sigmoid colon. Bx were normal. No further symptoms. She thinks that cutting out some of her caffeine and hydration has helped her. She has also cut out some NSAIDS.     Past Medical History:   Diagnosis Date   • Allergic rhinitis    • Anxiety    • IBS (irritable bowel syndrome)    • MVP (mitral valve prolapse)    • Vitamin D deficiency      Past Surgical History:   Procedure Laterality Date   • COLONOSCOPY     • COLONOSCOPY N/A 7/1/2019    Negative for colitis       Outpatient Medications Marked as Taking for the 10/22/19 encounter (Office Visit) with Deepti Cox APRN   Medication Sig Dispense Refill   • acebutolol (SECTRAL) 200 MG capsule Take 200 mg by mouth 2 (Two) Times a Day.     • ALPRAZolam (XANAX) 0.25 MG tablet Take 0.25 mg by mouth Daily As Needed for Anxiety.     • amitriptyline (ELAVIL) 10 MG tablet Take 10 mg by mouth Every Night.     • cyanocobalamin 1000 MCG/ML injection Inject 1,000 mcg into the appropriate muscle as directed by prescriber 1 (One) Time. Every 2 weeks     • fexofenadine-pseudoephedrine (ALLEGRA-D 24) 180-240 MG per 24 hr tablet Take 1 tablet by mouth Daily.     • ibuprofen (ADVIL,MOTRIN) 200 MG tablet Take 200 mg by mouth Every 6 (Six) Hours As Needed for Mild Pain .     • levonorgestrel (MIRENA, 52 MG,) 20 MCG/24HR IUD 1 each by Intrauterine route 1 (One) Time.     • Mirabegron ER (MYRBETRIQ) 25 MG tablet sustained-release 24 hour 24 hr tablet Take 25 mg by mouth Daily.     • Probiotic Product (PROBIOTIC ADVANCED PO) Take  by mouth Daily.     • venlafaxine (EFFEXOR) 150  MG tablet sustained-release 24 hour 24 hr tablet Take  by mouth Daily.     • vitamin D (ERGOCALCIFEROL) 25618 units capsule capsule Take 50,000 Units by mouth 1 (One) Time Per Week.         Allergies   Allergen Reactions   • Sulfa Antibiotics Other (See Comments)       Social History     Socioeconomic History   • Marital status:      Spouse name: Not on file   • Number of children: Not on file   • Years of education: Not on file   • Highest education level: Not on file   Tobacco Use   • Smoking status: Never Smoker   • Smokeless tobacco: Never Used   Substance and Sexual Activity   • Alcohol use: Yes     Comment: 3- 4 glasses of wine a week   • Drug use: No   • Sexual activity: Defer       Family History   Problem Relation Age of Onset   • Colon cancer Neg Hx    • Colon polyps Neg Hx        Review of Systems   Constitutional: Negative for activity change, appetite change, chills, diaphoresis, fatigue, fever and unexpected weight change.   HENT: Negative for ear pain, hearing loss, mouth sores, sore throat, trouble swallowing and voice change.    Eyes: Negative.    Respiratory: Negative for cough, choking, shortness of breath and wheezing.    Cardiovascular: Negative for chest pain and palpitations.   Gastrointestinal: Negative for abdominal pain, blood in stool, constipation, diarrhea, nausea and vomiting.   Endocrine: Negative for cold intolerance and heat intolerance.   Genitourinary: Negative for decreased urine volume, dysuria, frequency, hematuria and urgency.   Musculoskeletal: Negative for back pain, gait problem and myalgias.   Skin: Negative for color change, pallor and rash.   Allergic/Immunologic: Negative for food allergies and immunocompromised state.   Neurological: Negative for dizziness, tremors, seizures, syncope, weakness, light-headedness, numbness and headaches.   Hematological: Negative for adenopathy. Does not bruise/bleed easily.   Psychiatric/Behavioral: Negative for agitation and  "confusion. The patient is not nervous/anxious.    All other systems reviewed and are negative.      /70   Pulse 74   Ht 152.4 cm (60\")   Wt 66.2 kg (146 lb)   SpO2 99%   Breastfeeding? No   BMI 28.51 kg/m²   Body mass index is 28.51 kg/m².    Physical Exam   Constitutional: She is oriented to person, place, and time. She appears well-developed and well-nourished.   HENT:   Head: Normocephalic and atraumatic.   Eyes: Pupils are equal, round, and reactive to light.   Neck: Normal range of motion. Neck supple. No tracheal deviation present.   Cardiovascular: Normal rate, regular rhythm and normal heart sounds. Exam reveals no gallop and no friction rub.   No murmur heard.  Pulmonary/Chest: Effort normal and breath sounds normal. No respiratory distress. She has no wheezes. She has no rales. She exhibits no tenderness.   Abdominal: Soft. Bowel sounds are normal. She exhibits no distension. There is no hepatosplenomegaly. There is no tenderness. There is no rigidity, no rebound and no guarding.   Musculoskeletal: Normal range of motion. She exhibits no edema, tenderness or deformity.   Neurological: She is alert and oriented to person, place, and time. She has normal reflexes.   Skin: Skin is warm and dry. No rash noted. No pallor.   Psychiatric: She has a normal mood and affect. Her behavior is normal. Judgment and thought content normal.       ASSESSMENT AND PLAN    Patient's Body mass index is 28.51 kg/m². BMI is above normal parameters. Recommendations include: nutrition counseling.    Viviana was seen today for gi problem.    Diagnoses and all orders for this visit:    Diarrhea in adult patient    Nonsmoker    Obesity, unspecified obesity severity, unspecified obesity type    cont current call with any recurrent symptoms. She agrees.   Cont hydration  No NSAIDS      There are no Patient Instructions on file for this visit.  Deepti Cox, NIKOLAS  9:09 AM  10/22/2019    Obesity, Adult  Obesity is the " condition of having too much total body fat. Being overweight or obese means that your weight is greater than what is considered healthy for your body size. Obesity is determined by a measurement called BMI. BMI is an estimate of body fat and is calculated from height and weight. For adults, a BMI of 30 or higher is considered obese.  Obesity can eventually lead to other health concerns and major illnesses, including:  · Stroke.  · Coronary artery disease (CAD).  · Type 2 diabetes.  · Some types of cancer, including cancers of the colon, breast, uterus, and gallbladder.  · Osteoarthritis.  · High blood pressure (hypertension).  · High cholesterol.  · Sleep apnea.  · Gallbladder stones.  · Infertility problems.  What are the causes?  The main cause of obesity is taking in (consuming) more calories than your body uses for energy. Other factors that contribute to this condition may include:  · Being born with genes that make you more likely to become obese.  · Having a medical condition that causes obesity. These conditions include:  ¨ Hypothyroidism.  ¨ Polycystic ovarian syndrome (PCOS).  ¨ Binge-eating disorder.  ¨ Cushing syndrome.  · Taking certain medicines, such as steroids, antidepressants, and seizure medicines.  · Not being physically active (sedentary lifestyle).  · Living where there are limited places to exercise safely or buy healthy foods.  · Not getting enough sleep.  What increases the risk?  The following factors may increase your risk of this condition:  · Having a family history of obesity.  · Being a woman of -American descent.  · Being a man of  descent.  What are the signs or symptoms?  Having excessive body fat is the main symptom of this condition.  How is this diagnosed?  This condition may be diagnosed based on:  · Your symptoms.  · Your medical history.  · A physical exam. Your health care provider may measure:  ¨ Your BMI. If you are an adult with a BMI between 25 and less  than 30, you are considered overweight. If you are an adult with a BMI of 30 or higher, you are considered obese.  ¨ The distances around your hips and your waist (circumferences). These may be compared to each other to help diagnose your condition.  ¨ Your skinfold thickness. Your health care provider may gently pinch a fold of your skin and measure it.  How is this treated?  Treatment for this condition often includes changing your lifestyle. Treatment may include some or all of the following:  · Dietary changes. Work with your health care provider and a dietitian to set a weight-loss goal that is healthy and reasonable for you. Dietary changes may include eating:  ¨ Smaller portions. A portion size is the amount of a particular food that is healthy for you to eat at one time. This varies from person to person.  ¨ Low-calorie or low-fat options.  ¨ More whole grains, fruits, and vegetables.  · Regular physical activity. This may include aerobic activity (cardio) and strength training.  · Medicine to help you lose weight. Your health care provider may prescribe medicine if you are unable to lose 1 pound a week after 6 weeks of eating more healthily and doing more physical activity.  · Surgery. Surgical options may include gastric banding and gastric bypass. Surgery may be done if:  ¨ Other treatments have not helped to improve your condition.  ¨ You have a BMI of 40 or higher.  ¨ You have life-threatening health problems related to obesity.  Follow these instructions at home:     Eating and drinking     · Follow recommendations from your health care provider about what you eat and drink. Your health care provider may advise you to:  ¨ Limit fast foods, sweets, and processed snack foods.  ¨ Choose low-fat options, such as low-fat milk instead of whole milk.  ¨ Eat 5 or more servings of fruits or vegetables every day.  ¨ Eat at home more often. This gives you more control over what you eat.  ¨ Choose healthy foods  when you eat out.  ¨ Learn what a healthy portion size is.  ¨ Keep low-fat snacks on hand.  ¨ Avoid sugary drinks, such as soda, fruit juice, iced tea sweetened with sugar, and flavored milk.  ¨ Eat a healthy breakfast.  · Drink enough water to keep your urine clear or pale yellow.  · Do not go without eating for long periods of time (do not fast) or follow a fad diet. Fasting and fad diets can be unhealthy and even dangerous.  Physical Activity   · Exercise regularly, as told by your health care provider. Ask your health care provider what types of exercise are safe for you and how often you should exercise.  · Warm up and stretch before being active.  · Cool down and stretch after being active.  · Rest between periods of activity.  Lifestyle   · Limit the time that you spend in front of your TV, computer, or video game system.  · Find ways to reward yourself that do not involve food.  · Limit alcohol intake to no more than 1 drink a day for nonpregnant women and 2 drinks a day for men. One drink equals 12 oz of beer, 5 oz of wine, or 1½ oz of hard liquor.  General instructions   · Keep a weight loss journal to keep track of the food you eat and how much you exercise you get.  · Take over-the-counter and prescription medicines only as told by your health care provider.  · Take vitamins and supplements only as told by your health care provider.  · Consider joining a support group. Your health care provider may be able to recommend a support group.  · Keep all follow-up visits as told by your health care provider. This is important.  Contact a health care provider if:  · You are unable to meet your weight loss goal after 6 weeks of dietary and lifestyle changes.  This information is not intended to replace advice given to you by your health care provider. Make sure you discuss any questions you have with your health care provider.  Document Released: 01/25/2006 Document Revised: 05/22/2017 Document Reviewed:  10/05/2016  [employer] Interactive Patient Education © 2017 [employer]      IF YOU SMOKE OR USE TOBACCO PLEASE READ THE FOLLOWING:    Why is smoking bad for me?  Smoking increases the risk of heart disease, lung disease, vascular disease, stroke, and cancer.     If you smoke, STOP!    If you would like more information on quitting smoking, please visit the [employer] website: [email or web address]   This link will provide additional resources including the QUIT line and the Beat the Pack support groups.     For more information:    Quit Now [place]  1-800-QUIT-NOW  [email or web address]

## 2020-01-27 ENCOUNTER — OFFICE VISIT (OUTPATIENT)
Dept: OBGYN | Age: 50
End: 2020-01-27
Payer: COMMERCIAL

## 2020-01-27 VITALS
WEIGHT: 139 LBS | BODY MASS INDEX: 24.63 KG/M2 | DIASTOLIC BLOOD PRESSURE: 72 MMHG | HEART RATE: 60 BPM | HEIGHT: 63 IN | SYSTOLIC BLOOD PRESSURE: 106 MMHG

## 2020-01-27 PROCEDURE — 99396 PREV VISIT EST AGE 40-64: CPT | Performed by: NURSE PRACTITIONER

## 2020-01-27 ASSESSMENT — ENCOUNTER SYMPTOMS
EYES NEGATIVE: 1
GASTROINTESTINAL NEGATIVE: 1
RESPIRATORY NEGATIVE: 1

## 2020-01-27 NOTE — PATIENT INSTRUCTIONS
feel your breast tissue before moving on to the next spot. ? Check your entire breast, moving up and down as if following a strip from the collarbone to the bra line, and from the armpit to the ribs. Repeat until you have covered the entire breast.  ? Repeat this procedure for your left breast, using the pads of the 3 middle fingers of your right hand. · To examine your breasts while in the shower:  ? Place one arm over your head and lightly soap your breast on that side. ? Using the pads of your fingers, gently move your hand over your breast (in the strip pattern described above), feeling carefully for any lumps or changes. ? Repeat for the other breast.  · Have your doctor inspect anything you notice to see if you need further testing. Where can you learn more? Go to [email or web address] org and sign in to your Ninua account. Enter P148 in the InstaMed box to learn more about \"Breast Self-Exam: Care Instructions. \"     If you do not have an account, please click on the \"Sign Up Now\" link. Current as of: August 21, 2019  Content Version: 12.3  © 2739-4146 Gencia. Care instructions adapted under license by [facility] ([facility]). If you have questions about a medical condition or this instruction, always ask your healthcare professional. [place] [address] any warranty or liability for your use of this information. Patient Education        Well Visit, Ages 25 to 48: Care Instructions  Your Care Instructions    Physical exams can help you stay healthy. Your doctor has checked your overall health and may have suggested ways to take good care of yourself. He or she also may have recommended tests. At home, you can help prevent illness with healthy eating, regular exercise, and other steps. Follow-up care is a key part of your treatment and safety. Be sure to make and go to all appointments, and call your doctor if you are having problems.  It's also a good idea to know your test results and keep a list of the medicines you take. How can you care for yourself at home? · Reach and stay at a healthy weight. This will lower your risk for many problems, such as obesity, diabetes, heart disease, and high blood pressure. · Get at least 30 minutes of physical activity on most days of the week. Walking is a good choice. You also may want to do other activities, such as running, swimming, cycling, or playing tennis or team sports. Discuss any changes in your exercise program with your doctor. · Do not smoke or allow others to smoke around you. If you need help quitting, talk to your doctor about stop-smoking programs and medicines. These can increase your chances of quitting for good. · Talk to your doctor about whether you have any risk factors for sexually transmitted infections (STIs). Having one sex partner (who does not have STIs and does not have sex with anyone else) is a good way to avoid these infections. · Use birth control if you do not want to have children at this time. Talk with your doctor about the choices available and what might be best for you. · Protect your skin from too much sun. When you're outdoors from 10 a.m. to 4 p.m., stay in the shade or cover up with clothing and a hat with a wide brim. Wear sunglasses that block UV rays. Even when it's cloudy, put broad-spectrum sunscreen (SPF 30 or higher) on any exposed skin. · See a dentist one or two times a year for checkups and to have your teeth cleaned. · Wear a seat belt in the car. Follow your doctor's advice about when to have certain tests. These tests can spot problems early. For everyone  · Cholesterol. Have the fat (cholesterol) in your blood tested after age 21. Your doctor will tell you how often to have this done based on your age, family history, or other things that can increase your risk for heart disease. · Blood pressure.  Have your blood pressure checked during a have a father or brother who got prostate cancer when he was younger than 72. When should you call for help? Watch closely for changes in your health, and be sure to contact your doctor if you have any problems or symptoms that concern you. Where can you learn more? Go to https://shea.health-partners. org and sign in to your Storymix Media account. Enter P072 in the [facility] box to learn more about \"Well Visit, Ages 25 to 48: Care Instructions. \"     If you do not have an account, please click on the \"Sign Up Now\" link. Current as of: August 21, 2019  Content Version: 12.3  © 2916-6511 Vollee. Care instructions adapted under license by [facility] ([facility]). If you have questions about a medical condition or this instruction, always ask your healthcare professional. [address]  any warranty or liability for your use of this information. Patient Education        Human Papillomavirus (HPV): Care Instructions  Your Care Instructions  The human papillomavirus (HPV) is a very common virus. There are many types of HPV. Some types cause the common skin wart. Other types cause genital warts, which can be spread by sexual contact. Some types can increase the risk for cervical and anal cancer. Having one type of HPV does not lead to having another type. Many women who have HPV may not know that they are infected until it is found with a Pap test. Your doctor uses this test to look for abnormal cells on your cervix. If you have had an abnormal Pap test, your doctor may recommend that you have an HPV test.  Like a Pap test, an HPV test is done on a sample of cells collected from the cervix. If the test finds that you have the types of HPV that might lead to cancer, your doctor may suggest more tests. This does not mean that you will develop cancer; it means that you may have an increased risk. Abnormal cell changes caused by HPV often go away on their own.  If the medical condition or this instruction, always ask your healthcare professional. [place] [address] any warranty or liability for your use of this information.

## 2020-01-27 NOTE — PROGRESS NOTES
Carmine Bond is a 54 y.o. female who presents today for her medical conditions/ complaints as noted below. Carmine Bond is c/o of Annual Exam        HPI  Patient presents today for pap smear and breast exam.     Last mammogram:  2019  Last pap smear:   2018  Contraception:  IUD  :  2  Para:  2  AB:  0  Last bone density:  never  Last colonoscopy:   No LMP recorded. (Menstrual status: Other - See Notes). [id number]    Past Medical History:   Diagnosis Date    Allergic rhinitis     Anxiety     Chest pain     admitted  to 2013 with negative stress testing    Depression     Dizziness     Fatigue     Headache(784.0)     Meningitis spinal     History of this when 25 months old    Mitral valve prolapse     Palpitations      Past Surgical History:   Procedure Laterality Date    COLONOSCOPY  2015    normal    COLONOSCOPY N/A 2016    Dr Allie Alcala, punctate erosions of the left colon, C Diff (+)--6 yr recall     Family History   Problem Relation Age of Onset    Coronary Art Dis Father     Heart Disease Father     Crohn's Disease Mother     Liver Disease Mother         alcohol induced    Coronary Art Dis Other         Family History    Colon Cancer Neg Hx     Colon Polyps Neg Hx     Esophageal Cancer Neg Hx     Liver Cancer Neg Hx     Rectal Cancer Neg Hx     Stomach Cancer Neg Hx      Social History     Tobacco Use    Smoking status: Never Smoker    Smokeless tobacco: Never Used   Substance Use Topics    Alcohol use:  Yes     Alcohol/week: 0.0 standard drinks     Comment: gloria once or twice per week       Current Outpatient Medications   Medication Sig Dispense Refill    Multiple Vitamins-Minerals (THERAPEUTIC MULTIVITAMIN-MINERALS) tablet Take 1 tablet by mouth daily      Lactobacillus (PROBIOTIC ACIDOPHILUS PO) Take by mouth      amitriptyline (ELAVIL) 10 MG tablet Take 10 mg by mouth nightly as needed   2    venlafaxine (EFFEXOR XR) 150 MG posterior cervical adenopathy. Left cervical: No superficial, deep or posterior cervical adenopathy. Upper Body:      Right upper body: No supraclavicular, pectoral or epitrochlear adenopathy. Left upper body: No supraclavicular, pectoral or epitrochlear adenopathy. Skin:     General: Skin is warm and dry. Findings: No rash. Neurological:      Mental Status: She is alert and oriented to person, place, and time. She is not disoriented. Sensory: No sensory deficit. Coordination: Coordination normal.      Gait: Gait normal.      Deep Tendon Reflexes: Reflexes are normal and symmetric. Psychiatric:         Speech: Speech normal.         Behavior: Behavior normal.         Thought Content: Thought content normal.         Judgment: Judgment normal.          Diagnosis Orders   1. Well woman exam with routine gynecological exam     2. Screening for cervical cancer  PAP SMEAR   3. Screening for HPV (human papillomavirus)  Human papillomavirus (HPV) DNA probe thin prep high risk   4. Encounter for screening mammogram for breast cancer  DEEP DIGITAL SCREEN W CAD BILATERAL       MEDICATIONS:  No orders of the defined types were placed in this encounter. ORDERS:  Orders Placed This Encounter   Procedures    DEEP DIGITAL SCREEN W CAD BILATERAL    PAP SMEAR    Human papillomavirus (HPV) DNA probe thin prep high risk       PLAN:  Pap collected  Mammogram ordered  Patient Instructions     Patient Education        Breast Self-Exam: Care Instructions  Your Care Instructions    A breast self-exam is when you check your breasts for lumps or changes. This regular exam helps you learn how your breasts normally look and feel. Most breast problems or changes are not because of cancer. Breast self-exam is not a substitute for a mammogram. Having regular breast exams by your doctor and regular mammograms improve your chances of finding any problems with your breasts.   Some women set a time each month to do a step-by-step breast self-exam. Other women like a less formal system. They might look at their breasts as they brush their teeth, or feel their breasts once in a while in the shower. If you notice a change in your breast, tell your doctor. Follow-up care is a key part of your treatment and safety. Be sure to make and go to all appointments, and call your doctor if you are having problems. It's also a good idea to know your test results and keep a list of the medicines you take. How do you do a breast self-exam?  · The best time to examine your breasts is usually one week after your menstrual period begins. Your breasts should not be tender then. If you do not have periods, you might do your exam on a day of the month that is easy to remember. · To examine your breasts:  ? Remove all your clothes above the waist and lie down. When you are lying down, your breast tissue spreads evenly over your chest wall, which makes it easier to feel all your breast tissue. ? Use the pads--not the fingertips--of the 3 middle fingers of your left hand to check your right breast. Move your fingers slowly in small coin-sized circles that overlap. ? Use three levels of pressure to feel of all your breast tissue. Use light pressure to feel the tissue close to the skin surface. Use medium pressure to feel a little deeper. Use firm pressure to feel your tissue close to your breastbone and ribs. Use each pressure level to feel your breast tissue before moving on to the next spot. ? Check your entire breast, moving up and down as if following a strip from the collarbone to the bra line, and from the armpit to the ribs. Repeat until you have covered the entire breast.  ? Repeat this procedure for your left breast, using the pads of the 3 middle fingers of your right hand. · To examine your breasts while in the shower:  ? Place one arm over your head and lightly soap your breast on that side. ?  Using the pads of your fingers, the \"Sign Up Now\" link. Current as of: August 21, 2019  Content Version: 12.3  © 7355-2832 PalindromX. Care instructions adapted under license by [facility] ([facility]). If you have questions about a medical condition or this instruction, always ask your healthcare professional. [address] any warranty or liability for your use of this information. Patient Education        Human Papillomavirus (HPV): Care Instructions  Your Care Instructions  The human papillomavirus (HPV) is a very common virus. There are many types of HPV. Some types cause the common skin wart. Other types cause genital warts, which can be spread by sexual contact. Some types can increase the risk for cervical and anal cancer. Having one type of HPV does not lead to having another type. Many women who have HPV may not know that they are infected until it is found with a Pap test. Your doctor uses this test to look for abnormal cells on your cervix. If you have had an abnormal Pap test, your doctor may recommend that you have an HPV test.  Like a Pap test, an HPV test is done on a sample of cells collected from the cervix. If the test finds that you have the types of HPV that might lead to cancer, your doctor may suggest more tests. This does not mean that you will develop cancer; it means that you may have an increased risk. Abnormal cell changes caused by HPV often go away on their own. If the changes do not go away, they can be treated. But because HPV can stay inside the body, the abnormal cervical cells sometimes come back. This is why it is important to follow up with your doctor and have regular Pap tests. Follow-up care is a key part of your treatment and safety. Be sure to make and go to all appointments, and call your doctor if you are having problems. It's also a good idea to know your test results and keep a list of the medicines you take. How can you care for yourself at home?   · If you are going

## 2020-01-30 LAB
HPV COMMENT: NORMAL
HPV TYPE 16: NOT DETECTED
HPV TYPE 18: NOT DETECTED
HPVOH (OTHER TYPES): NOT DETECTED

## 2020-02-04 ENCOUNTER — HOSPITAL ENCOUNTER (OUTPATIENT)
Dept: WOMENS IMAGING | Age: 50
Discharge: HOME | End: 2020-02-04
Payer: COMMERCIAL

## 2020-02-04 PROCEDURE — 77063 BREAST TOMOSYNTHESIS BI: CPT

## 2020-06-03 ENCOUNTER — OFFICE VISIT (OUTPATIENT)
Dept: CARDIOLOGY | Age: 50
End: 2020-06-03
Payer: COMMERCIAL

## 2020-06-03 VITALS
HEIGHT: 60 IN | HEART RATE: 76 BPM | SYSTOLIC BLOOD PRESSURE: 94 MMHG | WEIGHT: 137 LBS | BODY MASS INDEX: 26.9 KG/M2 | DIASTOLIC BLOOD PRESSURE: 78 MMHG

## 2020-06-03 PROCEDURE — 99213 OFFICE O/P EST LOW 20 MIN: CPT | Performed by: INTERNAL MEDICINE

## 2020-06-03 ASSESSMENT — ENCOUNTER SYMPTOMS
BLOOD IN STOOL: 0
WHEEZING: 0
ABDOMINAL DISTENTION: 0
VOMITING: 0
COUGH: 0
EYE DISCHARGE: 0
DIARRHEA: 0
BACK PAIN: 0
SHORTNESS OF BREATH: 0
CONSTIPATION: 0

## 2020-08-03 ENCOUNTER — TELEPHONE (OUTPATIENT)
Dept: GASTROENTEROLOGY | Facility: CLINIC | Age: 50
End: 2020-08-03

## 2020-08-03 NOTE — TELEPHONE ENCOUNTER
----- Message from Viviana Arzate sent at 8/2/2020  6:39 AM CDT -----  Regarding: Non-Urgent Medical Question  Contact: [phone]  Hemorrhoids have been out for more than 3weeks, or longer. Not sore, just difficult to wipe & clean. Can't seem to fully poop, small mary jo mainly. At times, when I poop it is mucus and maybe a pebble or 2. Sorry for the icky email, just wanted to see if there is anything that seems off???

## 2020-08-03 NOTE — TELEPHONE ENCOUNTER
Start with Miralax 17 grams daily up to twice per day to get going. A stool softener may work as well over the counter. Increase water intake and avoid constipating foods, cheese, dairy, chocolate. Thank you Deepti CABRERA

## 2020-09-16 ENCOUNTER — TELEMEDICINE (OUTPATIENT)
Dept: FAMILY MEDICINE CLINIC | Facility: TELEHEALTH | Age: 50
End: 2020-09-16

## 2020-09-16 ENCOUNTER — HOSPITAL ENCOUNTER (EMERGENCY)
Facility: HOSPITAL | Age: 50
Discharge: HOME | End: 2020-09-16
Admitting: EMERGENCY MEDICINE

## 2020-09-16 ENCOUNTER — APPOINTMENT (OUTPATIENT)
Dept: CARDIOLOGY | Facility: HOSPITAL | Age: 50
End: 2020-09-16

## 2020-09-16 ENCOUNTER — APPOINTMENT (OUTPATIENT)
Dept: CT IMAGING | Facility: HOSPITAL | Age: 50
End: 2020-09-16

## 2020-09-16 ENCOUNTER — APPOINTMENT (OUTPATIENT)
Dept: GENERAL RADIOLOGY | Facility: HOSPITAL | Age: 50
End: 2020-09-16

## 2020-09-16 VITALS
BODY MASS INDEX: 29.45 KG/M2 | OXYGEN SATURATION: 98 % | RESPIRATION RATE: 16 BRPM | TEMPERATURE: 98.6 F | WEIGHT: 150 LBS | HEIGHT: 60 IN | SYSTOLIC BLOOD PRESSURE: 102 MMHG | HEART RATE: 63 BPM | DIASTOLIC BLOOD PRESSURE: 65 MMHG

## 2020-09-16 DIAGNOSIS — R20.2 NUMBNESS AND TINGLING OF RIGHT ARM: ICD-10-CM

## 2020-09-16 DIAGNOSIS — R07.9 CHEST PAIN, UNSPECIFIED TYPE: Primary | ICD-10-CM

## 2020-09-16 DIAGNOSIS — R20.0 NUMBNESS AND TINGLING OF RIGHT ARM: ICD-10-CM

## 2020-09-16 LAB
ALBUMIN SERPL-MCNC: 4.5 G/DL (ref 3.5–5.2)
ALBUMIN/GLOB SERPL: 1.7 G/DL
ALP SERPL-CCNC: 51 U/L (ref 39–117)
ALT SERPL W P-5'-P-CCNC: 14 U/L (ref 1–33)
ANION GAP SERPL CALCULATED.3IONS-SCNC: 8 MMOL/L (ref 5–15)
APTT PPP: 28.3 SECONDS (ref 24.1–35)
AST SERPL-CCNC: 32 U/L (ref 1–32)
B-HCG UR QL: NEGATIVE
BASOPHILS # BLD AUTO: 0.04 10*3/MM3 (ref 0–0.2)
BASOPHILS NFR BLD AUTO: 0.6 % (ref 0–1.5)
BH CV STRESS BP STAGE 1: NORMAL
BH CV STRESS BP STAGE 2: NORMAL
BH CV STRESS BP STAGE 3: NORMAL
BH CV STRESS DURATION MIN STAGE 1: 3
BH CV STRESS DURATION MIN STAGE 2: 3
BH CV STRESS DURATION MIN STAGE 3: 2
BH CV STRESS DURATION SEC STAGE 1: 0
BH CV STRESS DURATION SEC STAGE 2: 0
BH CV STRESS DURATION SEC STAGE 3: 25
BH CV STRESS GRADE STAGE 1: 10
BH CV STRESS GRADE STAGE 2: 12
BH CV STRESS GRADE STAGE 3: 14
BH CV STRESS HR STAGE 1: 87
BH CV STRESS HR STAGE 2: 105
BH CV STRESS HR STAGE 3: 121
BH CV STRESS METS STAGE 1: 5
BH CV STRESS METS STAGE 2: 7.5
BH CV STRESS METS STAGE 3: 10
BH CV STRESS PROTOCOL 1: NORMAL
BH CV STRESS RECOVERY BP: NORMAL MMHG
BH CV STRESS RECOVERY HR: 66 BPM
BH CV STRESS SPEED STAGE 1: 1.7
BH CV STRESS SPEED STAGE 2: 2.5
BH CV STRESS SPEED STAGE 3: 3.4
BH CV STRESS STAGE 1: 1
BH CV STRESS STAGE 2: 2
BH CV STRESS STAGE 3: 3
BILIRUB SERPL-MCNC: 0.5 MG/DL (ref 0–1.2)
BUN SERPL-MCNC: 10 MG/DL (ref 6–20)
BUN/CREAT SERPL: 12 (ref 7–25)
CALCIUM SPEC-SCNC: 9.6 MG/DL (ref 8.6–10.5)
CHLORIDE SERPL-SCNC: 100 MMOL/L (ref 98–107)
CO2 SERPL-SCNC: 30 MMOL/L (ref 22–29)
CREAT SERPL-MCNC: 0.83 MG/DL (ref 0.57–1)
DEPRECATED RDW RBC AUTO: 37.8 FL (ref 37–54)
EOSINOPHIL # BLD AUTO: 0.15 10*3/MM3 (ref 0–0.4)
EOSINOPHIL NFR BLD AUTO: 2.3 % (ref 0.3–6.2)
ERYTHROCYTE [DISTWIDTH] IN BLOOD BY AUTOMATED COUNT: 11.4 % (ref 12.3–15.4)
GFR SERPL CREATININE-BSD FRML MDRD: 73 ML/MIN/1.73
GLOBULIN UR ELPH-MCNC: 2.6 GM/DL
GLUCOSE SERPL-MCNC: 101 MG/DL (ref 65–99)
HCT VFR BLD AUTO: 38.4 % (ref 34–46.6)
HGB BLD-MCNC: 13.1 G/DL (ref 12–15.9)
HOLD SPECIMEN: NORMAL
IMM GRANULOCYTES # BLD AUTO: 0.01 10*3/MM3 (ref 0–0.05)
IMM GRANULOCYTES NFR BLD AUTO: 0.2 % (ref 0–0.5)
INR PPP: 1.01 (ref 0.91–1.09)
INTERNAL NEGATIVE CONTROL: NEGATIVE
INTERNAL POSITIVE CONTROL: POSITIVE
LIPASE SERPL-CCNC: 52 U/L (ref 13–60)
LYMPHOCYTES # BLD AUTO: 1.79 10*3/MM3 (ref 0.7–3.1)
LYMPHOCYTES NFR BLD AUTO: 27.4 % (ref 19.6–45.3)
Lab: NORMAL
MAXIMAL PREDICTED HEART RATE: 172 BPM
MCH RBC QN AUTO: 31 PG (ref 26.6–33)
MCHC RBC AUTO-ENTMCNC: 34.1 G/DL (ref 31.5–35.7)
MCV RBC AUTO: 91 FL (ref 79–97)
MONOCYTES # BLD AUTO: 0.52 10*3/MM3 (ref 0.1–0.9)
MONOCYTES NFR BLD AUTO: 8 % (ref 5–12)
NEUTROPHILS NFR BLD AUTO: 4.03 10*3/MM3 (ref 1.7–7)
NEUTROPHILS NFR BLD AUTO: 61.5 % (ref 42.7–76)
NRBC BLD AUTO-RTO: 0 /100 WBC (ref 0–0.2)
PERCENT MAX PREDICTED HR: 70.35 %
PLATELET # BLD AUTO: 242 10*3/MM3 (ref 140–450)
PMV BLD AUTO: 10.1 FL (ref 6–12)
POTASSIUM SERPL-SCNC: 4.2 MMOL/L (ref 3.5–5.2)
PROT SERPL-MCNC: 7.1 G/DL (ref 6–8.5)
PROTHROMBIN TIME: 12.9 SECONDS (ref 11.9–14.6)
RBC # BLD AUTO: 4.22 10*6/MM3 (ref 3.77–5.28)
SODIUM SERPL-SCNC: 138 MMOL/L (ref 136–145)
STRESS BASELINE BP: NORMAL MMHG
STRESS BASELINE HR: 52 BPM
STRESS PERCENT HR: 83 %
STRESS POST ESTIMATED WORKLOAD: 10 METS
STRESS POST EXERCISE DUR MIN: 8 MIN
STRESS POST EXERCISE DUR SEC: 25 SEC
STRESS POST PEAK BP: NORMAL MMHG
STRESS POST PEAK HR: 121 BPM
STRESS TARGET HR: 146 BPM
TROPONIN T SERPL-MCNC: <0.01 NG/ML (ref 0–0.03)
TROPONIN T SERPL-MCNC: <0.01 NG/ML (ref 0–0.03)
WBC # BLD AUTO: 6.54 10*3/MM3 (ref 3.4–10.8)
WHOLE BLOOD HOLD SPECIMEN: NORMAL
WHOLE BLOOD HOLD SPECIMEN: NORMAL

## 2020-09-16 PROCEDURE — 85610 PROTHROMBIN TIME: CPT | Performed by: NURSE PRACTITIONER

## 2020-09-16 PROCEDURE — 84484 ASSAY OF TROPONIN QUANT: CPT | Performed by: NURSE PRACTITIONER

## 2020-09-16 PROCEDURE — 93010 ELECTROCARDIOGRAM REPORT: CPT | Performed by: INTERNAL MEDICINE

## 2020-09-16 PROCEDURE — 85730 THROMBOPLASTIN TIME PARTIAL: CPT | Performed by: NURSE PRACTITIONER

## 2020-09-16 PROCEDURE — 36415 COLL VENOUS BLD VENIPUNCTURE: CPT

## 2020-09-16 PROCEDURE — 93017 CV STRESS TEST TRACING ONLY: CPT

## 2020-09-16 PROCEDURE — 83690 ASSAY OF LIPASE: CPT | Performed by: NURSE PRACTITIONER

## 2020-09-16 PROCEDURE — 25010000002 PERFLUTREN 6.52 MG/ML SUSPENSION: Performed by: INTERNAL MEDICINE

## 2020-09-16 PROCEDURE — 81025 URINE PREGNANCY TEST: CPT | Performed by: NURSE PRACTITIONER

## 2020-09-16 PROCEDURE — 85025 COMPLETE CBC W/AUTO DIFF WBC: CPT | Performed by: NURSE PRACTITIONER

## 2020-09-16 PROCEDURE — 93018 CV STRESS TEST I&R ONLY: CPT | Performed by: INTERNAL MEDICINE

## 2020-09-16 PROCEDURE — 99284 EMERGENCY DEPT VISIT MOD MDM: CPT

## 2020-09-16 PROCEDURE — 93005 ELECTROCARDIOGRAM TRACING: CPT | Performed by: EMERGENCY MEDICINE

## 2020-09-16 PROCEDURE — 71045 X-RAY EXAM CHEST 1 VIEW: CPT

## 2020-09-16 PROCEDURE — 99213 OFFICE O/P EST LOW 20 MIN: CPT | Performed by: NURSE PRACTITIONER

## 2020-09-16 PROCEDURE — 80053 COMPREHEN METABOLIC PANEL: CPT | Performed by: NURSE PRACTITIONER

## 2020-09-16 PROCEDURE — 93005 ELECTROCARDIOGRAM TRACING: CPT | Performed by: NURSE PRACTITIONER

## 2020-09-16 PROCEDURE — 70450 CT HEAD/BRAIN W/O DYE: CPT

## 2020-09-16 PROCEDURE — 93350 STRESS TTE ONLY: CPT | Performed by: INTERNAL MEDICINE

## 2020-09-16 PROCEDURE — 93352 ADMIN ECG CONTRAST AGENT: CPT | Performed by: INTERNAL MEDICINE

## 2020-09-16 PROCEDURE — 93350 STRESS TTE ONLY: CPT

## 2020-09-16 RX ORDER — SODIUM CHLORIDE 0.9 % (FLUSH) 0.9 %
10 SYRINGE (ML) INJECTION AS NEEDED
Status: DISCONTINUED | OUTPATIENT
Start: 2020-09-16 | End: 2020-09-16 | Stop reason: HOSPADM

## 2020-09-16 RX ORDER — L.ACID,PARA/B.BIFIDUM/S.THERM 8B CELL
CAPSULE ORAL
COMMUNITY
End: 2022-05-16

## 2020-09-16 RX ORDER — M-VIT,TX,IRON,MINS/CALC/FOLIC 27MG-0.4MG
1 TABLET ORAL
COMMUNITY
End: 2021-09-08

## 2020-09-16 RX ADMIN — PERFLUTREN 8.48 MG: 6.52 INJECTION, SUSPENSION INTRAVENOUS at 16:11

## 2020-09-16 NOTE — DISCHARGE INSTRUCTIONS
1. Plan to wear the LibrePro sensor for 14 days. It is okay to shower, bathe, and swim (up to 3 feet deep for 30 minutes)    2. Continue with your usual diabetes care plan - check blood sugars and take medicines, as prescribed.    3. Keep a log of what you eat and drink, when you take your medications and how much you take, and exercise you do while you are wearing the sensor.    3. Do not cover the sensor with extra adhesive (the small hole in the center of the sensor must remain uncovered)    4. Use a little extra care, especially when getting dressed or exercising, to avoid accidentally loosening or removing the sensor.     5. Remove the sensor if you need to have an MRI or CT scan.     6. Email or call BG log today (Thurs) or Friday.      Return the sensor to the [facility] on 8/7.    Follow-up appointment: 8/7    [facility] for the [facility] Area:  For Your Diabetes Education and Nutrition Appointments Call:  [phone]   For Diabetes Education or Nutrition Related Questions:   Phone: [phone]  E-mail: [email or web address]  Fax: [phone]   If you need a medication refill please contact your pharmacy. Please allow 3 business days for your refills to be completed.    Instructions for emailing the Diabetes Educators    If you need to communicate a non-urgent message to a Diabetes Educator via email, please send to [email or web address].    Please follow the following email guidelines:    Subject line: Secure: your clinic name (example: Secure: [facility])  In the email please include: First name, middle initial, last name and date of birth.    We will be in touch with you within one (1) business day.  8/7   Drink plenty of fluid.  Continue home medication.  Follow up with PCP and cardiology - call for appointment. Return to ED if condition does not improve or worsens

## 2020-09-16 NOTE — ED PROVIDER NOTES
Subjective   50 yof presents with c/o numbness and tingling in her right arm and right sided chest pain.  She states she woke up this am with the symptoms.  She denies fever, cough or SOB.  She denies injury.  The pain cannot be reproduced with palpation. The pain does not worsen with a deep breath. She describes the pain as a pressure. She has a history of MVP and sees Caesar, cardiologist, at [facility].  Her last visit was 06/2020.  She was told to continue her acebutolol and f/u in a year.  She states she has not had a cardiac stress test since 12/14 which was normal. She is alert and oriented and neurologically intact. She states she has anxiety and she believes her symptoms might be anxiety related.  She states she had a telehealth visit this am and she was advised to come in for evaluation.          Review of Systems   Constitutional: Negative for activity change, appetite change, fatigue and fever.   HENT: Negative for congestion, ear pain, facial swelling and sore throat.    Eyes: Negative for discharge and visual disturbance.   Respiratory: Negative for apnea, chest tightness, shortness of breath, wheezing and stridor.    Cardiovascular: Positive for chest pain. Negative for palpitations.   Gastrointestinal: Negative for abdominal distention, abdominal pain, diarrhea, nausea and vomiting.   Genitourinary: Negative for difficulty urinating and dysuria.   Musculoskeletal: Negative for arthralgias and myalgias.   Skin: Negative for rash and wound.   Neurological: Positive for numbness. Negative for dizziness and seizures.   Psychiatric/Behavioral: Negative for agitation and confusion.       Past Medical History:   Diagnosis Date   • Allergic rhinitis    • Anxiety    • IBS (irritable bowel syndrome)    • MVP (mitral valve prolapse)    • Vitamin D deficiency        Allergies   Allergen Reactions   • Sulfa Antibiotics Other (See Comments)       Past Surgical History:   Procedure Laterality Date   • COLONOSCOPY     •  COLONOSCOPY N/A 7/1/2019    Negative for colitis       Family History   Problem Relation Age of Onset   • Colon cancer Neg Hx    • Colon polyps Neg Hx        Social History     Socioeconomic History   • Marital status:      Spouse name: Not on file   • Number of children: Not on file   • Years of education: Not on file   • Highest education level: Not on file   Tobacco Use   • Smoking status: Never Smoker   • Smokeless tobacco: Never Used   Substance and Sexual Activity   • Alcohol use: Yes     Comment: 3- 4 glasses of wine a week   • Drug use: No   • Sexual activity: Defer           Objective   Physical Exam  Vitals signs and nursing note reviewed.   Constitutional:       Appearance: She is well-developed.   HENT:      Head: Normocephalic.   Eyes:      Pupils: Pupils are equal, round, and reactive to light.   Neck:      Musculoskeletal: Normal range of motion and neck supple.   Cardiovascular:      Rate and Rhythm: Normal rate and regular rhythm.   Pulmonary:      Effort: Pulmonary effort is normal. No respiratory distress.      Breath sounds: Normal breath sounds.   Abdominal:      General: Bowel sounds are normal.      Palpations: Abdomen is soft.   Musculoskeletal: Normal range of motion.   Skin:     General: Skin is warm and dry.   Neurological:      Mental Status: She is alert and oriented to person, place, and time.         Procedures          No current facility-administered medications for this encounter.     Current Outpatient Medications:   •  acebutolol (SECTRAL) 200 MG capsule, Take 200 mg by mouth 2 (Two) Times a Day., Disp: , Rfl:   •  ALPRAZolam (XANAX) 0.25 MG tablet, Take 0.25 mg by mouth Daily As Needed for Anxiety., Disp: , Rfl:   •  amitriptyline (ELAVIL) 10 MG tablet, Take 10 mg by mouth Every Night., Disp: , Rfl:   •  cyanocobalamin 1000 MCG/ML injection, Inject 1,000 mcg into the appropriate muscle as directed by prescriber 1 (One) Time. Every 2 weeks, Disp: , Rfl:   •   "fexofenadine-pseudoephedrine (ALLEGRA-D 24) 180-240 MG per 24 hr tablet, Take 1 tablet by mouth Daily., Disp: , Rfl:   •  lactobacillus acidophilus (RISAQUAD) capsule capsule, Take  by mouth., Disp: , Rfl:   •  levonorgestrel (MIRENA) 20 MCG/24HR IUD, by Intrauterine route., Disp: , Rfl:   •  levonorgestrel (MIRENA, 52 MG,) 20 MCG/24HR IUD, 1 each by Intrauterine route 1 (One) Time., Disp: , Rfl:   •  Probiotic Product (PROBIOTIC ADVANCED PO), Take  by mouth Daily., Disp: , Rfl:   •  therapeutic multivitamin-minerals (THERAGRAN-M) tablet, Take 1 tablet by mouth., Disp: , Rfl:   •  venlafaxine (EFFEXOR) 150 MG tablet sustained-release 24 hour 24 hr tablet, Take  by mouth Daily., Disp: , Rfl:   •  vitamin D (ERGOCALCIFEROL) 13963 units capsule capsule, Take 50,000 Units by mouth 1 (One) Time Per Week., Disp: , Rfl:     Vital signs:  /65 (BP Location: Right arm, Patient Position: Sitting)   Pulse 63   Temp 98.6 °F (37 °C) (Oral)   Resp 16   Ht 152.4 cm (60\")   Wt 68 kg (150 lb)   SpO2 98%   BMI 29.29 kg/m²        ED LAB RESULTS:   Lab Results (last 24 hours)     ** No results found for the last 24 hours. **             IMAGING RESULTS  CT Head Without Contrast   ED Interpretation   See results below      Preliminary Result   1. Normal nonenhanced CT head exam.   This report was finalized on 09/16/2020 13:29 by Dr. Deepti Venegas MD.      XR Chest 1 View   ED Interpretation   See results below      Final Result   Impression:       No acute cardiopulmonary disease.       This report was finalized on 09/16/2020 12:52 by Dr. Jessica Tejeda MD.                       ED Course  ED Course as of Sep 17 1721   Wed Sep 16, 2020   1505 The patient is continuing to have some right sided chest pressure.  I discussed the patient's history, assessment and testing results with Dr Bui.  I offered her a cardiac stress test and she is in agreement to have it completed.    [KS]   8622 Cardiac stress test results reviewed " - low risk for ischemia.  She will be dc'd home to f/u with PCP and cardiology.  The patient voiced understanding of treatment plan and is in agreement with plan.    [KS]      ED Course User Index  [KS] Enoch Rendon APRN                                         HEART Score (for prediction of 6-week risk of major adverse cardiac event) reviewed and/or performed as part of the patient evaluation and treatment planning process.  The result associated with this review/performance is: 2    NIHSS (NIH Stroke Scale/Score) reviewed and/or performed as part of the patient evaluation and treatment planning process.  The result associated with this review/performance is: 0       MDM  Number of Diagnoses or Management Options  Chest pain, unspecified type: new and requires workup  Numbness and tingling of right arm: new and requires workup     Amount and/or Complexity of Data Reviewed  Clinical lab tests: ordered and reviewed  Tests in the radiology section of CPT®: ordered and reviewed  Discuss the patient with other providers: yes  Independent visualization of images, tracings, or specimens: yes    Risk of Complications, Morbidity, and/or Mortality  Presenting problems: low  Diagnostic procedures: minimal  Management options: low    Patient Progress  Patient progress: stable      Final diagnoses:   Chest pain, unspecified type   Numbness and tingling of right arm            Enoch Rendon APRN  09/17/20 1777

## 2020-09-16 NOTE — PROGRESS NOTES
Subjective   Viviana Arzate is a 50 y.o. female.     This morning she woke up this morning and had an achy feeling in her right chest. It feels like pleurisy she has had in the past. She has a tickle in her throat, ringing in her ears. The ringing is off and on. The pain in her chest is coming and going. She feels the pain when sitting still. Yesterday she twisted her body on that side. Moving her body does not change the sensation. She rates the pain as a 3/10 and describes it as a tightening. She feels it under her breast. It does not feel hot or otherwise abnormal. She can feel the pain slightly more when she breathes. She feels anxious. The pain does not. She may have slept on her arm but its not going away. Has a hx of MVP-- she takes a beta blocker and her heart rate 50s-60s.    Chest Pain   This is a new problem. The current episode started today. The onset quality is sudden. Associated symptoms include malaise/fatigue and palpitations. Pertinent negatives include no cough, fever, headaches, irregular heartbeat, nausea, shortness of breath or vomiting.   Her past medical history is significant for mitral valve prolapse.        The following portions of the patient's history were reviewed and updated as appropriate: allergies, current medications, past family history, past medical history, past social history, past surgical history and problem list.    Review of Systems   Constitutional: Positive for malaise/fatigue. Negative for fever.   Respiratory: Positive for chest tightness. Negative for cough and shortness of breath.    Cardiovascular: Positive for chest pain and palpitations.   Gastrointestinal: Negative for nausea and vomiting.       Objective   Physical Exam  Constitutional:       General: She is not in acute distress.     Appearance: She is well-developed. She is not diaphoretic.   Pulmonary:      Effort: Pulmonary effort is normal.   Neurological:      Mental Status: She is alert and oriented to  person, place, and time.   Psychiatric:         Mood and Affect: Mood is anxious.         Behavior: Behavior normal.           Assessment/Plan   Diagnoses and all orders for this visit:    Chest pain, unspecified type          I consulted with my collaborating physician Dr. DAVID Carbajal and decided to refer this patient to the ED for chest pain work up.    I spent 15 minutes in the patient's chart for this video visit.

## 2021-02-01 ENCOUNTER — OFFICE VISIT (OUTPATIENT)
Dept: OBGYN CLINIC | Age: 51
End: 2021-02-01
Payer: COMMERCIAL

## 2021-02-01 VITALS
SYSTOLIC BLOOD PRESSURE: 104 MMHG | HEIGHT: 60 IN | DIASTOLIC BLOOD PRESSURE: 66 MMHG | WEIGHT: 145 LBS | BODY MASS INDEX: 28.47 KG/M2 | HEART RATE: 72 BPM

## 2021-02-01 DIAGNOSIS — Z12.4 SCREENING FOR CERVICAL CANCER: ICD-10-CM

## 2021-02-01 DIAGNOSIS — Z01.419 WELL WOMAN EXAM: Primary | ICD-10-CM

## 2021-02-01 DIAGNOSIS — Z12.31 ENCOUNTER FOR SCREENING MAMMOGRAM FOR BREAST CANCER: ICD-10-CM

## 2021-02-01 DIAGNOSIS — Z30.431 INTRAUTERINE DEVICE SURVEILLANCE: ICD-10-CM

## 2021-02-01 DIAGNOSIS — Z11.51 SCREENING FOR HPV (HUMAN PAPILLOMAVIRUS): ICD-10-CM

## 2021-02-01 PROCEDURE — 99396 PREV VISIT EST AGE 40-64: CPT | Performed by: NURSE PRACTITIONER

## 2021-02-01 ASSESSMENT — ENCOUNTER SYMPTOMS
GASTROINTESTINAL NEGATIVE: 1
RESPIRATORY NEGATIVE: 1
EYES NEGATIVE: 1

## 2021-02-01 NOTE — PROGRESS NOTES
Aleja Haro is a 52 y.o. female who presents today for her medical conditions/ complaints as noted below. Aleja Haro is c/o of Annual Exam        HPI  Pt presents today for pap smear and breast exam.  C/o weight gain and not being able to lose despite watching diet    Last mammogram:  2020  Last pap smear:  2020  Contraception:  IUD  :  2  Para:  2  AB:  0  Last bone density:  never  Last colonoscopy:   No LMP recorded. (Menstrual status: Other - See Notes). [id number]    Past Medical History:   Diagnosis Date    Allergic rhinitis     Anxiety     Chest pain     admitted  to 2013 with negative stress testing    Depression     Dizziness     Fatigue     Headache(784.0)     Meningitis spinal     History of this when 25 months old    Mitral valve prolapse     Palpitations      Past Surgical History:   Procedure Laterality Date    COLONOSCOPY  2015    normal    COLONOSCOPY N/A 2016    Dr Murali Cramer, punctate erosions of the left colon, C Diff (+)--6 yr recall     Family History   Problem Relation Age of Onset    Coronary Art Dis Father     Heart Disease Father     Crohn's Disease Mother     Liver Disease Mother         alcohol induced    Coronary Art Dis Other         Family History    Colon Cancer Neg Hx     Colon Polyps Neg Hx     Esophageal Cancer Neg Hx     Liver Cancer Neg Hx     Rectal Cancer Neg Hx     Stomach Cancer Neg Hx      Social History     Tobacco Use    Smoking status: Never Smoker    Smokeless tobacco: Never Used   Substance Use Topics    Alcohol use:  Yes     Alcohol/week: 0.0 standard drinks     Comment: gloria once or twice per week       Current Outpatient Medications   Medication Sig Dispense Refill    Multiple Vitamins-Minerals (THERAPEUTIC MULTIVITAMIN-MINERALS) tablet Take 1 tablet by mouth daily      Lactobacillus (PROBIOTIC ACIDOPHILUS PO) Take by mouth      venlafaxine (EFFEXOR XR) 150 MG extended release capsule Take 150 mg by mouth daily 75mg HS  2    ipratropium (ATROVENT) 0.06 % nasal spray USE 2 SPRAYS IN EACH NOSTRIL THREE TIMES A DAY AS NEEDED FOR RHINITIS 15 mL 0    cyanocobalamin 1000 MCG/ML injection Inject 1 ml into muscle every two weeks 10 mL 0    acebutolol (SECTRAL) 200 MG capsule Take 1 capsule by mouth 2 times daily (Patient taking differently: Take 200 mg by mouth 2 times daily Indications: MVP ) 60 capsule 5    Cholecalciferol (VITAMIN D3) 87949 UNITS CAPS Take 1 capsule by mouth once a week 12 capsule 3    B-D 3CC LUER-DIDI SYR 25GX1\" 25G X 1\" 3 ML MISC USE 5O INJECT B-12 AS PRESCRIBED 10 each 3    Levonorgestrel (MIRENA IU) by Intrauterine route.  ibuprofen (ADVIL;MOTRIN) 200 MG tablet Take 200 mg by mouth every 6 hours as needed. No current facility-administered medications for this visit. Allergies   Allergen Reactions    Sulfa Antibiotics      Vitals:    02/01/21 0911   BP: 104/66   Pulse: 72     Body mass index is 28.32 kg/m². Review of Systems   Constitutional: Positive for unexpected weight change. HENT: Negative. Eyes: Negative. Respiratory: Negative. Cardiovascular: Negative. Gastrointestinal: Negative. Genitourinary: Negative for difficulty urinating, dyspareunia, dysuria, enuresis, frequency, hematuria, menstrual problem, pelvic pain, urgency and vaginal discharge. Musculoskeletal: Negative. Skin: Negative. Neurological: Negative. Psychiatric/Behavioral: Negative. Physical Exam  Vitals signs and nursing note reviewed. Constitutional:       General: She is not in acute distress. Appearance: She is well-developed. She is not diaphoretic. HENT:      Head: Normocephalic and atraumatic. Right Ear: External ear normal.      Left Ear: External ear normal.      Nose: Nose normal.   Eyes:      General: Lids are normal.         Right eye: No discharge. Left eye: No discharge.       Conjunctiva/sclera: Conjunctivae normal.      Pupils: Pupils are equal, round, and reactive to light. Neck:      Musculoskeletal: Normal range of motion and neck supple. Thyroid: No thyroid mass or thyromegaly. Trachea: No tracheal deviation. Cardiovascular:      Rate and Rhythm: Normal rate and regular rhythm. Heart sounds: Normal heart sounds. No murmur. Pulmonary:      Effort: Pulmonary effort is normal.      Breath sounds: Normal breath sounds. No wheezing. Chest:      Breasts: Breasts are symmetrical.         Right: No inverted nipple, mass, nipple discharge, skin change or tenderness. Left: No inverted nipple, mass, nipple discharge, skin change or tenderness. Abdominal:      General: Bowel sounds are normal. There is no distension. Palpations: Abdomen is soft. There is no mass. Tenderness: There is no abdominal tenderness. Hernia: There is no hernia in the left inguinal area. Genitourinary:     Labia:         Right: No rash, tenderness or lesion. Left: No rash, tenderness or lesion. Vagina: No vaginal discharge or tenderness. Cervix: No cervical motion tenderness or discharge (normal cervical mucosa). Uterus: Not enlarged and not tender. Adnexa:         Right: No mass, tenderness or fullness. Left: No mass, tenderness or fullness. Rectum: No external hemorrhoid. Comments: Pap collected for cervical cytology; iud strings visualized at os  Musculoskeletal: Normal range of motion. General: No tenderness. Lymphadenopathy:      Cervical:      Right cervical: No superficial, deep or posterior cervical adenopathy. Left cervical: No superficial, deep or posterior cervical adenopathy. Upper Body:      Right upper body: No supraclavicular, pectoral or epitrochlear adenopathy. Left upper body: No supraclavicular, pectoral or epitrochlear adenopathy. Skin:     General: Skin is warm and dry. Findings: No rash. Neurological:      Mental Status: She is alert and oriented to person, place, and time. She is not disoriented. Sensory: No sensory deficit. Coordination: Coordination normal.      Gait: Gait normal.      Deep Tendon Reflexes: Reflexes are normal and symmetric. Psychiatric:         Speech: Speech normal.         Behavior: Behavior normal.         Thought Content: Thought content normal.         Judgment: Judgment normal.          Diagnosis Orders   1. Well woman exam  PAP SMEAR    Human papillomavirus (HPV) DNA probe thin prep high risk   2. Screening for cervical cancer  PAP SMEAR   3. Screening for HPV (human papillomavirus)  Human papillomavirus (HPV) DNA probe thin prep high risk   4. Encounter for screening mammogram for breast cancer  DEEP DIGITAL SCREEN W OR WO CAD BILATERAL   5. Intrauterine device surveillance         MEDICATIONS:  No orders of the defined types were placed in this encounter. ORDERS:  Orders Placed This Encounter   Procedures    DEEP DIGITAL SCREEN W OR WO CAD BILATERAL    PAP SMEAR    Human papillomavirus (HPV) DNA probe thin prep high risk       PLAN:  Pap collected  mammo ordered  We discussed med for wt loss but with her heart rate issues, may not be best idea  To check out [employer], let me know what she decides. There are no Patient Instructions on file for this visit.

## 2021-04-05 ENCOUNTER — TELEPHONE (OUTPATIENT)
Dept: GASTROENTEROLOGY | Facility: CLINIC | Age: 51
End: 2021-04-05

## 2021-04-05 NOTE — TELEPHONE ENCOUNTER
----- Message from Viviana Arzate sent at 4/5/2021 12:29 PM CDT -----  Regarding: Non-Urgent Medical Question  Contact: [phone]  In August, I sent a question about yucky hemorrhoids. I still have these two or three protrusive bumps on the outside of my bottom (anal area) that have not gone back in. Mostly they are a disturbance when wiping and feeling like I have not been able to completely get out all of the poop. On Sunday, I had an episode where I pooped several times during the day. It came with cramping and fatigue, to the point where I slept for approximately three and a half hours after I finished pooping. Today, my stomach is tender and I have had loose poops twice since 6am until now (noon). Over the last year, I feel like my family either sees me in the bathroom trying to poop or just coming out of the bathroom. Do I need an appointment?        Dr. Wiley said to make follow up with Deepti KINSEY had Monisha call her and schedule

## 2021-04-05 NOTE — TELEPHONE ENCOUNTER
----- Message from Viviana Huey sent at 4/5/2021 12:29 PM CDT -----  Regarding: Non-Urgent Medical Question  Contact: [phone]  In August, I sent a question about yucky hemorrhoids. I still have these two or three protrusive bumps on the outside of my bottom (anal area) that have not gone back in. Mostly they are a disturbance when wiping and feeling like I have not been able to completely get out all of the poop. On Sunday, I had an episode where I pooped several times during the day. It came with cramping and fatigue, to the point where I slept for approximately three and a half hours after I finished pooping. Today, my stomach is tender and I have had loose poops twice since 6am until now (noon). Over the last year, I feel like my family either sees me in the bathroom trying to poop or just coming out of the bathroom. Do I need an appointment?

## 2021-05-17 ENCOUNTER — OFFICE VISIT (OUTPATIENT)
Dept: GASTROENTEROLOGY | Facility: CLINIC | Age: 51
End: 2021-05-17

## 2021-05-17 VITALS
HEIGHT: 60 IN | SYSTOLIC BLOOD PRESSURE: 130 MMHG | OXYGEN SATURATION: 99 % | HEART RATE: 62 BPM | TEMPERATURE: 97.8 F | DIASTOLIC BLOOD PRESSURE: 76 MMHG | BODY MASS INDEX: 26.9 KG/M2 | WEIGHT: 137 LBS

## 2021-05-17 DIAGNOSIS — K64.9 HEMORRHOIDS, UNSPECIFIED HEMORRHOID TYPE: Primary | ICD-10-CM

## 2021-05-17 DIAGNOSIS — Z78.9 NONSMOKER: ICD-10-CM

## 2021-05-17 DIAGNOSIS — E66.9 OBESITY, UNSPECIFIED OBESITY SEVERITY, UNSPECIFIED OBESITY TYPE: ICD-10-CM

## 2021-05-17 PROCEDURE — 99213 OFFICE O/P EST LOW 20 MIN: CPT | Performed by: CLINICAL NURSE SPECIALIST

## 2021-05-17 RX ORDER — HYDROCORTISONE ACETATE 25 MG/1
25 SUPPOSITORY RECTAL 2 TIMES DAILY PRN
Qty: 30 SUPPOSITORY | Refills: 3 | Status: SHIPPED | OUTPATIENT
Start: 2021-05-17 | End: 2021-09-08

## 2021-05-17 NOTE — PROGRESS NOTES
Viviana Arzate  1970 5/17/2021  Chief Complaint   Patient presents with   • GI Problem     Hemorrhoids     Subjective   HPI  Viviana Arzate is a 50 y.o. female who presents with a complaint of hemorrhoids in the setting of constipation. She has swelling at the rectum with burning and itching at times. No BRBPR No melena. No fever chills or sweats. No wt loss.      Past Medical History:   Diagnosis Date   • Allergic rhinitis    • Anxiety    • IBS (irritable bowel syndrome)    • MVP (mitral valve prolapse)    • Vitamin D deficiency      Past Surgical History:   Procedure Laterality Date   • COLONOSCOPY     • COLONOSCOPY N/A 7/1/2019    Negative for colitis       Outpatient Medications Marked as Taking for the 5/17/21 encounter (Office Visit) with Deepti Cox APRN   Medication Sig Dispense Refill   • acebutolol (SECTRAL) 200 MG capsule Take 200 mg by mouth 2 (Two) Times a Day.     • ALPRAZolam (XANAX) 0.25 MG tablet Take 0.25 mg by mouth Daily As Needed for Anxiety.     • cyanocobalamin 1000 MCG/ML injection Inject 1,000 mcg into the appropriate muscle as directed by prescriber 1 (One) Time. Every 2 weeks     • lactobacillus acidophilus (RISAQUAD) capsule capsule Take  by mouth.     • levonorgestrel (MIRENA) 20 MCG/24HR IUD by Intrauterine route.     • levonorgestrel (MIRENA, 52 MG,) 20 MCG/24HR IUD 1 each by Intrauterine route 1 (One) Time.     • Probiotic Product (PROBIOTIC ADVANCED PO) Take  by mouth Daily.     • therapeutic multivitamin-minerals (THERAGRAN-M) tablet Take 1 tablet by mouth.     • venlafaxine (EFFEXOR) 150 MG tablet sustained-release 24 hour 24 hr tablet Take  by mouth Daily.     • vitamin D (ERGOCALCIFEROL) 56283 units capsule capsule Take 50,000 Units by mouth 1 (One) Time Per Week.       Allergies   Allergen Reactions   • Sulfa Antibiotics Other (See Comments)     Social History     Socioeconomic History   • Marital status:      Spouse name: Not on file   • Number of children:  Not on file   • Years of education: Not on file   • Highest education level: Not on file   Tobacco Use   • Smoking status: Never Smoker   • Smokeless tobacco: Never Used   Substance and Sexual Activity   • Alcohol use: Yes     Comment: 3- 4 glasses of wine a week   • Drug use: No   • Sexual activity: Defer     Family History   Problem Relation Age of Onset   • Colon cancer Neg Hx    • Colon polyps Neg Hx      Health Maintenance   Topic Date Due   • ANNUAL PHYSICAL  Never done   • COVID-19 Vaccine (1) Never done   • HEPATITIS C SCREENING  Never done   • INFLUENZA VACCINE  08/01/2021   • PAP SMEAR  02/01/2024   • TDAP/TD VACCINES (2 - Td) 12/15/2024   • COLORECTAL CANCER SCREENING  07/01/2029   • Pneumococcal Vaccine 0-64  Aged Out     Review of Systems   Constitutional: Negative for activity change, appetite change, chills, diaphoresis, fatigue, fever and unexpected weight change.   HENT: Negative for ear pain, hearing loss, mouth sores, sore throat, trouble swallowing and voice change.    Eyes: Negative.    Respiratory: Negative for cough, choking, shortness of breath and wheezing.    Cardiovascular: Negative for chest pain and palpitations.   Gastrointestinal: Negative for abdominal pain, blood in stool, constipation, diarrhea, nausea and vomiting.   Endocrine: Negative for cold intolerance and heat intolerance.   Genitourinary: Negative for decreased urine volume, dysuria, frequency, hematuria and urgency.   Musculoskeletal: Negative for back pain, gait problem and myalgias.   Skin: Negative for color change, pallor and rash.   Allergic/Immunologic: Negative for food allergies and immunocompromised state.   Neurological: Negative for dizziness, tremors, seizures, syncope, weakness, light-headedness, numbness and headaches.   Hematological: Negative for adenopathy. Does not bruise/bleed easily.   Psychiatric/Behavioral: Negative for agitation and confusion. The patient is not nervous/anxious.    All other systems  "reviewed and are negative.    Objective   Vitals:    05/17/21 1314   BP: 130/76   Pulse: 62   Temp: 97.8 °F (36.6 °C)   SpO2: 99%   Weight: 62.1 kg (137 lb)   Height: 152.4 cm (60\")     Body mass index is 26.76 kg/m².  Physical Exam  Constitutional:       Appearance: She is well-developed.   HENT:      Head: Normocephalic and atraumatic.   Eyes:      Pupils: Pupils are equal, round, and reactive to light.   Neck:      Trachea: No tracheal deviation.   Cardiovascular:      Rate and Rhythm: Normal rate and regular rhythm.      Heart sounds: Normal heart sounds. No murmur heard.   No friction rub. No gallop.    Pulmonary:      Effort: Pulmonary effort is normal. No respiratory distress.      Breath sounds: Normal breath sounds. No wheezing or rales.   Chest:      Chest wall: No tenderness.   Abdominal:      General: Bowel sounds are normal. There is no distension.      Palpations: Abdomen is soft. Abdomen is not rigid.      Tenderness: There is no abdominal tenderness. There is no guarding or rebound.   Genitourinary:     Comments: Large external hemorrhoids. Normal internal exam. No mass appreciated.  Musculoskeletal:         General: No tenderness or deformity. Normal range of motion.      Cervical back: Normal range of motion and neck supple.   Skin:     General: Skin is warm and dry.      Coloration: Skin is not pale.      Findings: No rash.   Neurological:      Mental Status: She is alert and oriented to person, place, and time.      Deep Tendon Reflexes: Reflexes are normal and symmetric.   Psychiatric:         Behavior: Behavior normal.         Thought Content: Thought content normal.         Judgment: Judgment normal.       Assessment/Plan   Diagnoses and all orders for this visit:    1. Hemorrhoids, unspecified hemorrhoid type (Primary)  Comments:  large external hemorrhoids not thrombosed at this time.   Orders:  -     hydrocortisone (ANUSOL-HC) 25 MG suppository; Insert 1 suppository into the rectum 2 (Two) " Times a Day As Needed for Hemorrhoids (rectal discomfort).  Dispense: 30 suppository; Refill: 3    2. Obesity, unspecified obesity severity, unspecified obesity type    3. Nonsmoker      Fiber regimen discussed and recommended  Increase water intake  Miralax suggested     Part of this note may be an electronic transcription/translation of spoken language to printed text using the Dragon Dictation System.  Body mass index is 26.76 kg/m².  Return if symptoms worsen or fail to improve.    Patient's Body mass index is 26.76 kg/m². indicating that she is overweight (BMI 25-29.9). Obesity-related health conditions include the following: none. Obesity is unchanged. BMI is is above average; BMI management plan is completed. We discussed portion control and increasing exercise..      All risks, benefits, alternatives, and indications of colonoscopy and/or Endoscopy procedure have been discussed with the patient. Risks to include perforation of the colon requiring possible surgery or colostomy, risk of bleeding from biopsies or removal of colon tissue, possibility of missing a colon polyp or cancer, or adverse drug reaction.  Benefits to include the diagnosis and management of disease of the colon and rectum. Alternatives to include barium enema, radiographic evaluation, lab testing or no intervention. Pt verbalizes understanding and agrees.     Deepti Cox, APRN  5/17/2021  13:36 CDT          If you smoke or use tobacco, 4 minutes reading provided  Steps to Quit Smoking  Smoking tobacco can be harmful to your health and can affect almost every organ in your body. Smoking puts you, and those around you, at risk for developing many serious chronic diseases. Quitting smoking is difficult, but it is one of the best things that you can do for your health. It is never too late to quit.  What are the benefits of quitting smoking?  When you quit smoking, you lower your risk of developing serious diseases and conditions, such  as:  · Lung cancer or lung disease, such as COPD.  · Heart disease.  · Stroke.  · Heart attack.  · Infertility.  · Osteoporosis and bone fractures.  Additionally, symptoms such as coughing, wheezing, and shortness of breath may get better when you quit. You may also find that you get sick less often because your body is stronger at fighting off colds and infections. If you are pregnant, quitting smoking can help to reduce your chances of having a baby of low birth weight.  How do I get ready to quit?  When you decide to quit smoking, create a plan to make sure that you are successful. Before you quit:  · Pick a date to quit. Set a date within the next two weeks to give you time to prepare.  · Write down the reasons why you are quitting. Keep this list in places where you will see it often, such as on your bathroom mirror or in your car or wallet.  · Identify the people, places, things, and activities that make you want to smoke (triggers) and avoid them. Make sure to take these actions:  ¨ Throw away all cigarettes at home, at work, and in your car.  ¨ Throw away smoking accessories, such as ashtrays and lighters.  ¨ Clean your car and make sure to empty the ashtray.  ¨ Clean your home, including curtains and carpets.  · Tell your family, friends, and coworkers that you are quitting. Support from your loved ones can make quitting easier.  · Talk with your health care provider about your options for quitting smoking.  · Find out what treatment options are covered by your health insurance.  What strategies can I use to quit smoking?  Talk with your healthcare provider about different strategies to quit smoking. Some strategies include:  · Quitting smoking altogether instead of gradually lessening how much you smoke over a period of time. Research shows that quitting “cold turkey” is more successful than gradually quitting.  · Attending in-person counseling to help you build problem-solving skills. You are more likely  to have success in quitting if you attend several counseling sessions. Even short sessions of 10 minutes can be effective.  · Finding resources and support systems that can help you to quit smoking and remain smoke-free after you quit. These resources are most helpful when you use them often. They can include:  ¨ Online chats with a counselor.  ¨ Telephone quitlines.  ¨ Printed self-help materials.  ¨ Support groups or group counseling.  ¨ Text messaging programs.  ¨ Mobile phone applications.  · Taking medicines to help you quit smoking. (If you are pregnant or breastfeeding, talk with your health care provider first.) Some medicines contain nicotine and some do not. Both types of medicines help with cravings, but the medicines that include nicotine help to relieve withdrawal symptoms. Your health care provider may recommend:  ¨ Nicotine patches, gum, or lozenges.  ¨ Nicotine inhalers or sprays.  ¨ Non-nicotine medicine that is taken by mouth.  Talk with your health care provider about combining strategies, such as taking medicines while you are also receiving in-person counseling. Using these two strategies together makes you more likely to succeed in quitting than if you used either strategy on its own.  If you are pregnant or breastfeeding, talk with your health care provider about finding counseling or other support strategies to quit smoking. Do not take medicine to help you quit smoking unless told to do so by your health care provider.  What things can I do to make it easier to quit?  Quitting smoking might feel overwhelming at first, but there is a lot that you can do to make it easier. Take these important actions:  · Reach out to your family and friends and ask that they support and encourage you during this time. Call telephone quitlines, reach out to support groups, or work with a counselor for support.  · Ask people who smoke to avoid smoking around you.  · Avoid places that trigger you to smoke, such  as bars, parties, or smoke-break areas at work.  · Spend time around people who do not smoke.  · Lessen stress in your life, because stress can be a smoking trigger for some people. To lessen stress, try:  ¨ Exercising regularly.  ¨ Deep-breathing exercises.  ¨ Yoga.  ¨ Meditating.  ¨ Performing a body scan. This involves closing your eyes, scanning your body from head to toe, and noticing which parts of your body are particularly tense. Purposefully relax the muscles in those areas.  · Download or purchase mobile phone or tablet apps (applications) that can help you stick to your quit plan by providing reminders, tips, and encouragement. There are many free apps, such as QuitGuide from the CDC (Centers for Disease Control and Prevention). You can find other support for quitting smoking (smoking cessation) through smokefree.gov and other websites.  How will I feel when I quit smoking?  Within the first 24 hours of quitting smoking, you may start to feel some withdrawal symptoms. These symptoms are usually most noticeable 2-3 days after quitting, but they usually do not last beyond 2-3 weeks. Changes or symptoms that you might experience include:  · Mood swings.  · Restlessness, anxiety, or irritation.  · Difficulty concentrating.  · Dizziness.  · Strong cravings for sugary foods in addition to nicotine.  · Mild weight gain.  · Constipation.  · Nausea.  · Coughing or a sore throat.  · Changes in how your medicines work in your body.  · A depressed mood.  · Difficulty sleeping (insomnia).  After the first 2-3 weeks of quitting, you may start to notice more positive results, such as:  · Improved sense of smell and taste.  · Decreased coughing and sore throat.  · Slower heart rate.  · Lower blood pressure.  · Clearer skin.  · The ability to breathe more easily.  · Fewer sick days.  Quitting smoking is very challenging for most people. Do not get discouraged if you are not successful the first time. Some people need to  make many attempts to quit before they achieve long-term success. Do your best to stick to your quit plan, and talk with your health care provider if you have any questions or concerns.  This information is not intended to replace advice given to you by your health care provider. Make sure you discuss any questions you have with your health care provider.  Document Released: 12/12/2002 Document Revised: 08/15/2017 Document Reviewed: 05/03/2016  Elsevier Interactive Patient Education © 2017 [employer]vier Inc.

## 2021-06-09 ENCOUNTER — OFFICE VISIT (OUTPATIENT)
Dept: CARDIOLOGY CLINIC | Age: 51
End: 2021-06-09
Payer: COMMERCIAL

## 2021-06-09 VITALS
DIASTOLIC BLOOD PRESSURE: 62 MMHG | WEIGHT: 138 LBS | HEART RATE: 64 BPM | BODY MASS INDEX: 27.09 KG/M2 | SYSTOLIC BLOOD PRESSURE: 84 MMHG | HEIGHT: 60 IN

## 2021-06-09 DIAGNOSIS — R00.2 PALPITATIONS: Primary | ICD-10-CM

## 2021-06-09 PROCEDURE — 99214 OFFICE O/P EST MOD 30 MIN: CPT | Performed by: INTERNAL MEDICINE

## 2021-06-09 ASSESSMENT — ENCOUNTER SYMPTOMS
CONSTIPATION: 0
WHEEZING: 0
COUGH: 0
DIARRHEA: 0
SHORTNESS OF BREATH: 0
EYE DISCHARGE: 0
ABDOMINAL DISTENTION: 0
BLOOD IN STOOL: 0
VOMITING: 0
BACK PAIN: 0

## 2021-06-09 NOTE — PROGRESS NOTES
[id number] [facility]  Cardiology Office Note  [phone] [id number]  Phone: [phone]  Fax: [phone]                            Date:  6/9/2021  Patient: Afsaneh Bravo  Age:  52 y.o., 1970    Referral: No ref. provider found      PROBLEM LIST:    Patient Active Problem List    Diagnosis Date Noted    Bilateral hand numbness      Priority: Low    SOB (shortness of breath) 02/07/2017     Priority: Low    Mild mitral regurgitation by prior echocardiogram 02/07/2017     Priority: Low    History of Clostridium difficile colitis 11/22/2016     Priority: Low    LLQ abdominal pain 09/22/2016     Priority: Low    Rectal bleeding 09/22/2016     Priority: Low    Change in bowel habits 09/22/2016     Priority: Low    Diarrhea 09/22/2016     Priority: Low    Abnormal CT of the abdomen 09/22/2016     Priority: Low    Colon wall thickening 09/22/2016     Priority: Low    Chronic constipation 10/23/2015     Priority: Low    Family history of ischemic heart disease 07/21/2012     Priority: Low    Allergic rhinitis 07/21/2012     Priority: Low    Fatigue      Priority: Low    Palpitations      Priority: Low    Mitral valve prolapse      Priority: Low    Chest pain      Priority: Low     Overview Note:     2009  SE  negative for myocardial ischemia  12/14/2013  SE  negative for myocardial ischemia       1. History of palpitations, controlled on acebutolol, normal LV ejection fraction, echo 2/2017, negative stress test 12/2014    PRESENTATION: Afsaneh Bravo is a 52y.o. year old female presents for follow-up evaluation. In September she was seen for chest pain at [facility]. She had just jumped off a golf cart in the next against persistent midsternal chest pain. In the ER she underwent a stress echo where she exercised 8 minutes with no obvious evidence of ischemia. No EKG changes were noted. Enzymes were negative.   She was discharged with no recurrent symptoms. In January when she was at work during [employer] she had difficulty focusing. She was previously used to doing multitasking but this was becoming difficult. She was placed on Adderall. She did have some increase in palpitations briefly for a few weeks but this subsided. She has been doing well with Adderall by her account with improved ability at work. She is on acebutolol taking 2 capsules once daily. REVIEW OF SYSTEMS:  Review of Systems   Constitutional: Negative for activity change, fatigue and fever. HENT: Negative for ear pain, hearing loss and tinnitus. Eyes: Negative for discharge and visual disturbance. Respiratory: Negative for cough, shortness of breath and wheezing. Cardiovascular: Negative for chest pain, palpitations and leg swelling. Gastrointestinal: Negative for abdominal distention, blood in stool, constipation, diarrhea and vomiting. Endocrine: Negative for cold intolerance, heat intolerance, polydipsia and polyuria. Genitourinary: Negative for dysuria and hematuria. Musculoskeletal: Negative for arthralgias, back pain and myalgias. Skin: Negative for pallor and rash. Neurological: Negative for seizures, syncope, weakness and headaches. Psychiatric/Behavioral: Negative for behavioral problems and dysphoric mood.        Past Medical History:      Diagnosis Date    Allergic rhinitis     Anxiety     Chest pain     admitted 12-13 to 12- with negative stress testing    Depression     Dizziness     Fatigue     Headache(784.0)     Meningitis spinal     History of this when 25 months old    Mitral valve prolapse     Palpitations        Past Surgical History:      Procedure Laterality Date    COLONOSCOPY  6/2015    normal    COLONOSCOPY N/A 9/27/2016    Dr Claudine Bettencourt, punctate erosions of the left colon, C Diff (+)--6 yr recall       Medications:  Current Outpatient Medications   Medication Sig Dispense Refill    Multiple Vitamins-Minerals (THERAPEUTIC MULTIVITAMIN-MINERALS) tablet Take 1 tablet by mouth daily      Lactobacillus (PROBIOTIC ACIDOPHILUS PO) Take by mouth      venlafaxine (EFFEXOR XR) 150 MG extended release capsule Take 150 mg by mouth daily 75mg HS  2    ipratropium (ATROVENT) 0.06 % nasal spray USE 2 SPRAYS IN EACH NOSTRIL THREE TIMES A DAY AS NEEDED FOR RHINITIS 15 mL 0    cyanocobalamin 1000 MCG/ML injection Inject 1 ml into muscle every two weeks 10 mL 0    acebutolol (SECTRAL) 200 MG capsule Take 1 capsule by mouth 2 times daily (Patient taking differently: Take 200 mg by mouth 2 times daily Indications: MVP ) 60 capsule 5    Cholecalciferol (VITAMIN D3) 60306 UNITS CAPS Take 1 capsule by mouth once a week 12 capsule 3    B-D 3CC LUER-DIDI SYR 25GX1\" 25G X 1\" 3 ML MISC USE 5O INJECT B-12 AS PRESCRIBED 10 each 3    Levonorgestrel (MIRENA IU) by Intrauterine route.  ibuprofen (ADVIL;MOTRIN) 200 MG tablet Take 200 mg by mouth every 6 hours as needed. No current facility-administered medications for this visit. Allergies:  Sulfa antibiotics    Past Social History:  Social History     Socioeconomic History    Marital status:      Spouse name: Not on file    Number of children: Not on file    Years of education: Not on file    Highest education level: Not on file   Occupational History    Not on file   Tobacco Use    Smoking status: Never Smoker    Smokeless tobacco: Never Used   Substance and Sexual Activity    Alcohol use:  Yes     Alcohol/week: 0.0 standard drinks     Comment: Clide Shall once or twice per week    Drug use: No    Sexual activity: Yes     Partners: Male   Other Topics Concern    Not on file   Social History Narrative    Not on file     Social Determinants of Health     Financial Resource Strain:     Difficulty of Paying Living Expenses:    Food Insecurity:     Worried About Running Out of Food in the Last Year:     [address] in the Last Year: Transportation Needs:     Lack of Transportation (Medical):  Lack of Transportation (Non-Medical):    Physical Activity:     Days of Exercise per Week:     Minutes of Exercise per Session:    Stress:     Feeling of Stress :    Social Connections:     Frequency of Communication with Friends and Family:     Frequency of Social Gatherings with Friends and Family:     Attends Voodoo Services:     Active Member of Clubs or Organizations:     Attends Club or Organization Meetings:     Marital Status:    Intimate Partner Violence:     Fear of Current or Ex-Partner:     Emotionally Abused:     Physically Abused:     Sexually Abused:        Family History:       Problem Relation Age of Onset    Coronary Art Dis Father     Heart Disease Father     Crohn's Disease Mother     Liver Disease Mother         alcohol induced    Coronary Art Dis Other         Family History    Colon Cancer Neg Hx     Colon Polyps Neg Hx     Esophageal Cancer Neg Hx     Liver Cancer Neg Hx     Rectal Cancer Neg Hx     Stomach Cancer Neg Hx          Physical Examination:  BP 84/62   Pulse 64   Ht 5' (1.524 m)   Wt 138 lb (62.6 kg)   BMI 26.95 kg/m²   Physical Exam  Constitutional:       General: She is not in acute distress. Appearance: She is not diaphoretic. Comments: Normal build  Blood pressure right arm sitting 100/60 mmHg, pulse 60 bpm regular   HENT:      Mouth/Throat:      Pharynx: No oropharyngeal exudate. Eyes:      General: No scleral icterus. Right eye: No discharge. Left eye: No discharge. Neck:      Thyroid: No thyromegaly. Vascular: No carotid bruit or JVD. Comments: No thyromegaly  Cardiovascular:      Rate and Rhythm: Normal rate and regular rhythm. No extrasystoles are present. Heart sounds: Normal heart sounds, S1 normal and S2 normal. No murmur heard. No systolic murmur is present. No diastolic murmur is present. No friction rub. No gallop.  No S3 or Her blood pressure and heart rate are actually on the lower side despite taking a stimulant like Adderall. She is on acebutalol 400 mg daily. Would have a trial 200 mg daily and reassess if palpitations recur. If she feels there is a difference at work she may also try and come off Adderall and reassess her symptoms. The goal would be to get her off Adderall long-term if there are reversible causes. I would also have her do thyroid studies. No recent labs for thyroid noted. Clinical exam is otherwise unremarkable. Recent stress echo also was unremarkable. 2.  A follow-up appointment has been made on a yearly basis but I will be in touch with her once her blood test results are available. If there is any change in status have asked her to be in touch with me. Orders:  Orders Placed This Encounter   Procedures    TSH WITH REFLEX TO FT4     No orders of the defined types were placed in this encounter. Return in about 1 year (around 6/9/2022). Electronically signed by Kyree Jiménez MD on 6/9/2021 at [facility]      Thisdictation was generated by voice recognition computer software. Although all attempts are made to edit the dictation for accuracy, there may be errors in the transcription that are not intended.

## 2021-09-03 DIAGNOSIS — F90.9 ATTENTION DEFICIT HYPERACTIVITY DISORDER (ADHD), UNSPECIFIED ADHD TYPE: Primary | ICD-10-CM

## 2021-09-03 RX ORDER — DEXTROAMPHETAMINE SACCHARATE, AMPHETAMINE ASPARTATE MONOHYDRATE, DEXTROAMPHETAMINE SULFATE AND AMPHETAMINE SULFATE 3.75; 3.75; 3.75; 3.75 MG/1; MG/1; MG/1; MG/1
15 CAPSULE, EXTENDED RELEASE ORAL EVERY MORNING
Qty: 30 CAPSULE | Refills: 0 | Status: SHIPPED | OUTPATIENT
Start: 2021-09-03 | End: 2021-09-20 | Stop reason: SDUPTHER

## 2021-09-03 NOTE — PROGRESS NOTES
Pt well known to me.  Had to cancel her appt today due to my flat tire and not getting to office.  I have seen her before as a patient and know that Adderall XR works well for her ADHD.  She has now sharon appt for the 8th but I will send in her Adderall because she took last pill today.

## 2021-09-08 ENCOUNTER — OFFICE VISIT (OUTPATIENT)
Dept: FAMILY MEDICINE CLINIC | Facility: CLINIC | Age: 51
End: 2021-09-08

## 2021-09-08 VITALS
BODY MASS INDEX: 27.13 KG/M2 | OXYGEN SATURATION: 99 % | TEMPERATURE: 98 F | HEIGHT: 60 IN | SYSTOLIC BLOOD PRESSURE: 112 MMHG | WEIGHT: 138.2 LBS | DIASTOLIC BLOOD PRESSURE: 64 MMHG | HEART RATE: 64 BPM

## 2021-09-08 DIAGNOSIS — Z00.00 WELLNESS EXAMINATION: Primary | ICD-10-CM

## 2021-09-08 PROCEDURE — 99386 PREV VISIT NEW AGE 40-64: CPT | Performed by: FAMILY MEDICINE

## 2021-09-08 RX ORDER — IPRATROPIUM BROMIDE 42 UG/1
SPRAY, METERED NASAL
COMMUNITY
Start: 2021-08-28 | End: 2021-11-02 | Stop reason: SDUPTHER

## 2021-09-09 ENCOUNTER — PATIENT ROUNDING (BHMG ONLY) (OUTPATIENT)
Dept: INTERNAL MEDICINE | Facility: CLINIC | Age: 51
End: 2021-09-09

## 2021-09-09 NOTE — PROGRESS NOTES
September 9, 2021    Hello, may I speak with Viviana Huey?    My name is Veronika     I am  with [facility]  [address], [address]  [place] [address] [phone].    Before we get started may I verify your date of birth? 1970    I am calling to officially welcome you to our practice and ask about your recent visit. Is this a good time to talk? yes    Tell me about your visit with us. What things went well? pleased with provider. She felt like everything was addressed and couldnot have been better       We're always looking for ways to make our patients' experiences even better. Do you have recommendations on ways we may improve?  no    Overall were you satisfied with your first visit to our practice? yes       I appreciate you taking the time to speak with me today. Is there anything else I can do for you? no      Thank you, and have a great day.

## 2021-09-20 ENCOUNTER — LAB (OUTPATIENT)
Dept: LAB | Facility: HOSPITAL | Age: 51
End: 2021-09-20

## 2021-09-20 ENCOUNTER — OFFICE VISIT (OUTPATIENT)
Dept: FAMILY MEDICINE CLINIC | Facility: CLINIC | Age: 51
End: 2021-09-20

## 2021-09-20 ENCOUNTER — NURSE TRIAGE (OUTPATIENT)
Dept: OTHER | Facility: CLINIC | Age: 51
End: 2021-09-20

## 2021-09-20 VITALS
WEIGHT: 138.9 LBS | BODY MASS INDEX: 27.27 KG/M2 | SYSTOLIC BLOOD PRESSURE: 110 MMHG | DIASTOLIC BLOOD PRESSURE: 70 MMHG | HEIGHT: 60 IN | HEART RATE: 78 BPM | TEMPERATURE: 97.2 F | OXYGEN SATURATION: 99 %

## 2021-09-20 DIAGNOSIS — M79.672 LEFT FOOT PAIN: ICD-10-CM

## 2021-09-20 DIAGNOSIS — Z00.00 WELLNESS EXAMINATION: ICD-10-CM

## 2021-09-20 DIAGNOSIS — J04.0 LARYNGITIS: ICD-10-CM

## 2021-09-20 DIAGNOSIS — M25.472 SWOLLEN L ANKLE: ICD-10-CM

## 2021-09-20 DIAGNOSIS — M25.472 SWOLLEN L ANKLE: Primary | ICD-10-CM

## 2021-09-20 DIAGNOSIS — F90.9 ATTENTION DEFICIT HYPERACTIVITY DISORDER (ADHD), UNSPECIFIED ADHD TYPE: ICD-10-CM

## 2021-09-20 LAB
ALBUMIN SERPL-MCNC: 4.5 G/DL (ref 3.5–5.2)
ALBUMIN/GLOB SERPL: 1.8 G/DL
ALP SERPL-CCNC: 64 U/L (ref 39–117)
ALT SERPL W P-5'-P-CCNC: 15 U/L (ref 1–33)
ANION GAP SERPL CALCULATED.3IONS-SCNC: 8 MMOL/L (ref 5–15)
AST SERPL-CCNC: 41 U/L (ref 1–32)
BACTERIA UR QL AUTO: ABNORMAL /HPF
BILIRUB SERPL-MCNC: 0.2 MG/DL (ref 0–1.2)
BILIRUB UR QL STRIP: NEGATIVE
BUN SERPL-MCNC: 10 MG/DL (ref 6–20)
BUN/CREAT SERPL: 11 (ref 7–25)
CALCIUM SPEC-SCNC: 8.9 MG/DL (ref 8.6–10.5)
CHLORIDE SERPL-SCNC: 104 MMOL/L (ref 98–107)
CLARITY UR: ABNORMAL
CO2 SERPL-SCNC: 28 MMOL/L (ref 22–29)
COLOR UR: YELLOW
CREAT SERPL-MCNC: 0.91 MG/DL (ref 0.57–1)
GFR SERPL CREATININE-BSD FRML MDRD: 66 ML/MIN/1.73
GLOBULIN UR ELPH-MCNC: 2.5 GM/DL
GLUCOSE SERPL-MCNC: 90 MG/DL (ref 65–99)
GLUCOSE UR STRIP-MCNC: NEGATIVE MG/DL
HGB UR QL STRIP.AUTO: ABNORMAL
HYALINE CASTS UR QL AUTO: ABNORMAL /LPF
KETONES UR QL STRIP: NEGATIVE
LEUKOCYTE ESTERASE UR QL STRIP.AUTO: ABNORMAL
MAGNESIUM SERPL-MCNC: 2.2 MG/DL (ref 1.6–2.6)
NITRITE UR QL STRIP: NEGATIVE
PH UR STRIP.AUTO: 6.5 [PH] (ref 5–8)
POTASSIUM SERPL-SCNC: 3.9 MMOL/L (ref 3.5–5.2)
PROT SERPL-MCNC: 7 G/DL (ref 6–8.5)
PROT UR QL STRIP: NEGATIVE
RBC # UR: ABNORMAL /HPF
REF LAB TEST METHOD: ABNORMAL
SODIUM SERPL-SCNC: 140 MMOL/L (ref 136–145)
SP GR UR STRIP: 1.02 (ref 1–1.03)
SQUAMOUS #/AREA URNS HPF: ABNORMAL /HPF
T4 FREE SERPL-MCNC: 0.94 NG/DL (ref 0.93–1.7)
TSH SERPL DL<=0.05 MIU/L-ACNC: 0.69 UIU/ML (ref 0.27–4.2)
URATE SERPL-MCNC: 4.5 MG/DL (ref 2.4–5.7)
UROBILINOGEN UR QL STRIP: ABNORMAL
WBC UR QL AUTO: ABNORMAL /HPF

## 2021-09-20 PROCEDURE — 36415 COLL VENOUS BLD VENIPUNCTURE: CPT

## 2021-09-20 PROCEDURE — 81001 URINALYSIS AUTO W/SCOPE: CPT

## 2021-09-20 PROCEDURE — 84439 ASSAY OF FREE THYROXINE: CPT

## 2021-09-20 PROCEDURE — 99214 OFFICE O/P EST MOD 30 MIN: CPT | Performed by: FAMILY MEDICINE

## 2021-09-20 PROCEDURE — 84550 ASSAY OF BLOOD/URIC ACID: CPT

## 2021-09-20 PROCEDURE — 80053 COMPREHEN METABOLIC PANEL: CPT

## 2021-09-20 PROCEDURE — 82607 VITAMIN B-12: CPT

## 2021-09-20 PROCEDURE — 84443 ASSAY THYROID STIM HORMONE: CPT

## 2021-09-20 PROCEDURE — 82306 VITAMIN D 25 HYDROXY: CPT

## 2021-09-20 PROCEDURE — 83735 ASSAY OF MAGNESIUM: CPT

## 2021-09-20 PROCEDURE — 96372 THER/PROPH/DIAG INJ SC/IM: CPT | Performed by: FAMILY MEDICINE

## 2021-09-20 PROCEDURE — 87086 URINE CULTURE/COLONY COUNT: CPT

## 2021-09-20 RX ORDER — DEXTROAMPHETAMINE SACCHARATE, AMPHETAMINE ASPARTATE MONOHYDRATE, DEXTROAMPHETAMINE SULFATE AND AMPHETAMINE SULFATE 3.75; 3.75; 3.75; 3.75 MG/1; MG/1; MG/1; MG/1
15 CAPSULE, EXTENDED RELEASE ORAL EVERY MORNING
Qty: 30 CAPSULE | Refills: 0 | Status: SHIPPED | OUTPATIENT
Start: 2021-09-20 | End: 2021-11-16 | Stop reason: SDUPTHER

## 2021-09-20 RX ORDER — PREDNISONE 10 MG/1
10 TABLET ORAL DAILY
Qty: 5 TABLET | Refills: 0 | Status: SHIPPED | OUTPATIENT
Start: 2021-09-20 | End: 2022-01-26

## 2021-09-20 RX ORDER — METHYLPREDNISOLONE ACETATE 40 MG/ML
40 INJECTION, SUSPENSION INTRA-ARTICULAR; INTRALESIONAL; INTRAMUSCULAR; SOFT TISSUE ONCE
Status: COMPLETED | OUTPATIENT
Start: 2021-09-20 | End: 2021-09-20

## 2021-09-20 RX ADMIN — METHYLPREDNISOLONE ACETATE 40 MG: 40 INJECTION, SUSPENSION INTRA-ARTICULAR; INTRALESIONAL; INTRAMUSCULAR; SOFT TISSUE at 15:38

## 2021-09-20 NOTE — TELEPHONE ENCOUNTER
Received call from Roldan Martin at [facility] with Red Flag Complaint. Brief description of triage: patient calling with bilateral ankle swelling, left greater than right, and left great toe numbness. See below assessment. Triage indicates for patient to see PCP within 3 days, advised patient if unable to get an appointment in the suggested time frame to go to an [facility] or ED, patient agreeable. Care advice provided, patient verbalizes understanding; denies any other questions or concerns; instructed to call back for any new or worsening symptoms. Advised using a humidifier for hoarse voice x7 days, no other symptoms to triage. Unable to reach  at [facility] for patient scheduling, message left in [facility] chat, acknowledged by Elodia Gonzalez. Attention Provider: Thank you for allowing me to participate in the care of your patient. The patient was connected to triage in response to information provided to the [facility]. Please do not respond through this encounter as the response is not directed to a shared pool. Reason for Disposition   Swollen ankle joint  (Exception: area of localized swelling which is itchy)    Answer Assessment - Initial Assessment Questions  1. LOCATION: \"Which joint is swollen? \"      Bilateral, left more than right, toes are bigger    2. ONSET: \"When did the swelling start? \"      Saturday night, Sunday could hardly walk on it, limping today    3. SIZE: \"How large is the swelling?\"      1.5 x the size of her other ankle, 1/2 a golf ball on the side    4. PAIN: \"Is there any pain? \" If so, ask: \"How bad is it? \" (Scale 1-10; or mild, moderate, severe)      Yes, excrutiating when she rests, doesn't feel it as much once she gets up and moving, ibuprofen helped     5. CAUSE: \"What do you think caused the swollen joint? \"      Has been working a lot with her daughters wedding, been on her feet a lot    10. OTHER SYMPTOMS: \"Do you have any other symptoms? \" (e.g., fever, chest pain, difficulty breathing, calf pain)      Hoarse voice x7 days, numb great left toe, joints are aching     7. PREGNANCY: \"Is there any chance you are pregnant? \" \"When was your last menstrual period? \"      Not asked    Protocols used: [facility]

## 2021-09-21 LAB
25(OH)D3 SERPL-MCNC: 89.5 NG/ML
BACTERIA SPEC AEROBE CULT: ABNORMAL
VIT B12 BLD-MCNC: 710 PG/ML (ref 211–946)

## 2021-09-24 ENCOUNTER — PATIENT MESSAGE (OUTPATIENT)
Dept: FAMILY MEDICINE CLINIC | Facility: CLINIC | Age: 51
End: 2021-09-24

## 2021-10-14 RX ORDER — METRONIDAZOLE 500 MG/1
500 TABLET ORAL 2 TIMES DAILY WITH MEALS
Qty: 14 TABLET | Refills: 0 | Status: SHIPPED | OUTPATIENT
Start: 2021-10-14 | End: 2021-10-21

## 2021-11-01 ENCOUNTER — PATIENT MESSAGE (OUTPATIENT)
Dept: FAMILY MEDICINE CLINIC | Facility: CLINIC | Age: 51
End: 2021-11-01

## 2021-11-01 DIAGNOSIS — R00.2 PALPITATIONS: ICD-10-CM

## 2021-11-01 DIAGNOSIS — E55.9 VITAMIN D DEFICIENCY: ICD-10-CM

## 2021-11-01 DIAGNOSIS — R45.86 MOOD SWINGS: ICD-10-CM

## 2021-11-01 DIAGNOSIS — E53.8 VITAMIN B 12 DEFICIENCY: Primary | ICD-10-CM

## 2021-11-01 DIAGNOSIS — J30.89 NON-SEASONAL ALLERGIC RHINITIS DUE TO OTHER ALLERGIC TRIGGER: ICD-10-CM

## 2021-11-01 DIAGNOSIS — I34.1 MITRAL VALVE PROLAPSE: ICD-10-CM

## 2021-11-02 DIAGNOSIS — E55.9 VITAMIN D DEFICIENCY: ICD-10-CM

## 2021-11-02 DIAGNOSIS — J30.89 NON-SEASONAL ALLERGIC RHINITIS DUE TO OTHER ALLERGIC TRIGGER: ICD-10-CM

## 2021-11-02 DIAGNOSIS — R45.86 MOOD SWINGS: ICD-10-CM

## 2021-11-02 DIAGNOSIS — I34.1 MITRAL VALVE PROLAPSE: ICD-10-CM

## 2021-11-02 DIAGNOSIS — R00.2 PALPITATIONS: ICD-10-CM

## 2021-11-02 DIAGNOSIS — E53.8 VITAMIN B 12 DEFICIENCY: Primary | ICD-10-CM

## 2021-11-02 PROBLEM — J30.9 ALLERGIC RHINITIS DUE TO ALLERGEN: Status: ACTIVE | Noted: 2021-11-02

## 2021-11-02 RX ORDER — ERGOCALCIFEROL 1.25 MG/1
50000 CAPSULE ORAL WEEKLY
Qty: 12 CAPSULE | Refills: 3 | Status: SHIPPED | OUTPATIENT
Start: 2021-11-02 | End: 2022-06-03 | Stop reason: SDUPTHER

## 2021-11-02 RX ORDER — VENLAFAXINE 75 MG/1
75 TABLET ORAL 2 TIMES DAILY
Qty: 60 TABLET | Refills: 2 | Status: SHIPPED | OUTPATIENT
Start: 2021-11-02 | End: 2021-11-16

## 2021-11-02 RX ORDER — CYANOCOBALAMIN 1000 UG/ML
INJECTION, SOLUTION INTRAMUSCULAR; SUBCUTANEOUS
Qty: 6 ML | Refills: 1 | Status: SHIPPED | OUTPATIENT
Start: 2021-11-02 | End: 2022-01-26 | Stop reason: SDUPTHER

## 2021-11-02 RX ORDER — ACEBUTOLOL HYDROCHLORIDE 200 MG/1
200 CAPSULE ORAL 2 TIMES DAILY
Qty: 60 CAPSULE | Refills: 5 | Status: SHIPPED | OUTPATIENT
Start: 2021-11-02 | End: 2022-04-26

## 2021-11-02 RX ORDER — IPRATROPIUM BROMIDE 42 UG/1
SPRAY, METERED NASAL
Qty: 45 ML | Refills: 3 | Status: SHIPPED | OUTPATIENT
Start: 2021-11-02 | End: 2022-08-12

## 2021-11-02 RX ORDER — ALPRAZOLAM 0.25 MG/1
0.25 TABLET ORAL DAILY PRN
Qty: 15 TABLET | Refills: 0 | Status: SHIPPED | OUTPATIENT
Start: 2021-11-02 | End: 2021-12-02

## 2021-11-02 NOTE — TELEPHONE ENCOUNTER
From: Denise Painting  To:  Office of LUCI Feng  Sent: 11/2/2021 12:19 PM CDT  Subject: Medication Renewal Request    Refills have been requested for the following medications:     acebutolol (SECTRAL) 200 MG capsule [facility] Feng]    Preferred [facility]

## 2021-11-02 NOTE — PROGRESS NOTES
LIZ reviewed today.  No fills for Xanax in the past year.  Adderall XR isn't filled Q 30 days regularly, rx lasts perhaps a little longer. She has Rx on file at Pharmacy for next Adderall fill.  I sent in just #15 of the Xanax today for her prn use for palpitations, MVP and associated anxiety.

## 2021-11-02 NOTE — TELEPHONE ENCOUNTER
"From: Viviana Arzate  To: Donna Hood MD  Sent: 11/1/2021 8:10 AM CDT  Subject: Prescription Refills and Pharmacy Change    Good morning Dr. Hood!  Would it be possible to get these prescriptions refilled at [facility] in [place]? My refills have been going to [facility] in [place] [facility], but [place] is more convenient. You have been sending the other prescriptions here, but I just thought about these.     Acebutolol 200 mg two times daily  Vitamin D 1.25mg (50,000 units) once per week  Cyanocobalamin 1000mcg/ml every two weeks (plus syringes 25gX1\" 25GX 1\" 3ml)  Venlafaxine 150mg sustained release 24 hour 24 hr (Could I possibly get the 75mg twice daily to see if I could drop down from 150?  Alprazolam .25mg as needed for anxiety  Ipratropium 0.06% nasal spray two puffs three times daily    Thanks  Tae"

## 2021-11-16 DIAGNOSIS — F90.9 ATTENTION DEFICIT HYPERACTIVITY DISORDER (ADHD), UNSPECIFIED ADHD TYPE: ICD-10-CM

## 2021-11-16 DIAGNOSIS — R45.86 MOOD SWINGS: Primary | ICD-10-CM

## 2021-11-16 RX ORDER — VENLAFAXINE HYDROCHLORIDE 150 MG/1
150 CAPSULE, EXTENDED RELEASE ORAL DAILY
COMMUNITY
Start: 2021-09-30 | End: 2021-11-16 | Stop reason: SDUPTHER

## 2021-11-16 RX ORDER — VENLAFAXINE HYDROCHLORIDE 150 MG/1
150 CAPSULE, EXTENDED RELEASE ORAL DAILY
Qty: 30 CAPSULE | Refills: 5 | Status: SHIPPED | OUTPATIENT
Start: 2021-11-16 | End: 2022-06-03 | Stop reason: SDUPTHER

## 2021-11-16 RX ORDER — DEXTROAMPHETAMINE SACCHARATE, AMPHETAMINE ASPARTATE MONOHYDRATE, DEXTROAMPHETAMINE SULFATE AND AMPHETAMINE SULFATE 3.75; 3.75; 3.75; 3.75 MG/1; MG/1; MG/1; MG/1
15 CAPSULE, EXTENDED RELEASE ORAL EVERY MORNING
Qty: 30 CAPSULE | Refills: 0 | Status: SHIPPED | OUTPATIENT
Start: 2021-11-16 | End: 2022-01-04 | Stop reason: SDUPTHER

## 2021-12-02 DIAGNOSIS — R00.2 PALPITATIONS: ICD-10-CM

## 2021-12-02 DIAGNOSIS — I34.1 MITRAL VALVE PROLAPSE: ICD-10-CM

## 2021-12-02 RX ORDER — ALPRAZOLAM 0.25 MG/1
TABLET ORAL
Qty: 15 TABLET | Refills: 1 | Status: SHIPPED | OUTPATIENT
Start: 2021-12-02 | End: 2022-08-07 | Stop reason: SDUPTHER

## 2021-12-02 NOTE — TELEPHONE ENCOUNTER
Rx Refill Note  Requested Prescriptions     Pending Prescriptions Disp Refills   • ALPRAZolam (XANAX) 0.25 MG tablet [Pharmacy Med Name: ALPRAZolam 0.25 MG TABLET] 15 tablet      Sig: TAKE ONE TABLET BY MOUTH DAILY AS NEEDED FOR ANXIETY      Last office visit with prescribing clinician: 9/20/2021      Next office visit with prescribing clinician: 1/19/2022            Dominique Rodriguez MA  12/02/21, 09:44 CST

## 2021-12-02 NOTE — TELEPHONE ENCOUNTER
Prn use for rapid heart beats and associated panic/anxiety; has f/u in the office in [place] reviewed today

## 2021-12-28 ENCOUNTER — TELEPHONE (OUTPATIENT)
Dept: FAMILY MEDICINE CLINIC | Facility: CLINIC | Age: 51
End: 2021-12-28

## 2021-12-28 ENCOUNTER — DOCUMENTATION (OUTPATIENT)
Dept: FAMILY MEDICINE CLINIC | Facility: CLINIC | Age: 51
End: 2021-12-28

## 2021-12-28 NOTE — TELEPHONE ENCOUNTER
Pharmacy called stating patient is allergic to sulfa and the burn cream prescribed is sulfa based. She was calling inquiring if an alternate could be sent in.

## 2021-12-29 NOTE — PROGRESS NOTES
Pt burned her arm making candy before Christmas and it's not healing as fast as she thinks it should.  Sent in a picture for me to review.  Wound looks dry, somewhat shallow centrally and has some yellow-orange clear crusting.  There is no surrounding sign of impetigo.  There is no drainage.  There is no sign of any cellulitis or surrounding redness/swelling.    Pt has an allergy to sulfa on her chart but says it was from something that happened as a child but she doesn't know what.  Not sure if it was a true allergy.  I am prescribing silver sulfadiazine  topically to help the wound heal and counseled the pt to let me know if she has any start of any reaction/intolerance to it.  She was agreeable.  Will have her send another picture of the wound in 7-10 days, sooner if not improving.

## 2022-01-04 DIAGNOSIS — F90.9 ATTENTION DEFICIT HYPERACTIVITY DISORDER (ADHD), UNSPECIFIED ADHD TYPE: ICD-10-CM

## 2022-01-05 RX ORDER — DEXTROAMPHETAMINE SACCHARATE, AMPHETAMINE ASPARTATE MONOHYDRATE, DEXTROAMPHETAMINE SULFATE AND AMPHETAMINE SULFATE 3.75; 3.75; 3.75; 3.75 MG/1; MG/1; MG/1; MG/1
15 CAPSULE, EXTENDED RELEASE ORAL EVERY MORNING
Qty: 30 CAPSULE | Refills: 0 | Status: SHIPPED | OUTPATIENT
Start: 2022-01-05 | End: 2022-01-26 | Stop reason: SDUPTHER

## 2022-01-05 NOTE — TELEPHONE ENCOUNTER
Rx Refill Note  Requested Prescriptions     Pending Prescriptions Disp Refills   • amphetamine-dextroamphetamine XR (Adderall XR) 15 MG 24 hr capsule 30 capsule 0     Sig: Take 1 capsule by mouth Every Morning For ADHD      Last office visit with prescribing clinician: 9/20/2021      Next office visit with prescribing clinician: 1/19/2022            ILIA Longo  01/05/22, 08:16 CST

## 2022-01-26 ENCOUNTER — OFFICE VISIT (OUTPATIENT)
Dept: FAMILY MEDICINE CLINIC | Facility: CLINIC | Age: 52
End: 2022-01-26

## 2022-01-26 VITALS
HEIGHT: 60 IN | TEMPERATURE: 97.1 F | HEART RATE: 57 BPM | BODY MASS INDEX: 28.58 KG/M2 | WEIGHT: 145.6 LBS | SYSTOLIC BLOOD PRESSURE: 116 MMHG | OXYGEN SATURATION: 95 % | DIASTOLIC BLOOD PRESSURE: 80 MMHG

## 2022-01-26 DIAGNOSIS — Z00.00 LABORATORY TESTS ORDERED AS PART OF A COMPLETE PHYSICAL EXAM (CPE): ICD-10-CM

## 2022-01-26 DIAGNOSIS — B00.1 RECURRENT COLD SORES: Primary | ICD-10-CM

## 2022-01-26 DIAGNOSIS — F90.9 ATTENTION DEFICIT HYPERACTIVITY DISORDER (ADHD), UNSPECIFIED ADHD TYPE: ICD-10-CM

## 2022-01-26 DIAGNOSIS — E55.9 VITAMIN D DEFICIENCY: ICD-10-CM

## 2022-01-26 DIAGNOSIS — R00.2 PALPITATIONS: ICD-10-CM

## 2022-01-26 DIAGNOSIS — E53.8 VITAMIN B 12 DEFICIENCY: ICD-10-CM

## 2022-01-26 DIAGNOSIS — Z23 NEED FOR INFLUENZA VACCINATION: ICD-10-CM

## 2022-01-26 DIAGNOSIS — I34.1 MITRAL VALVE PROLAPSE: ICD-10-CM

## 2022-01-26 PROCEDURE — 99215 OFFICE O/P EST HI 40 MIN: CPT | Performed by: FAMILY MEDICINE

## 2022-01-26 PROCEDURE — 90686 IIV4 VACC NO PRSV 0.5 ML IM: CPT | Performed by: FAMILY MEDICINE

## 2022-01-26 PROCEDURE — 90471 IMMUNIZATION ADMIN: CPT | Performed by: FAMILY MEDICINE

## 2022-01-26 RX ORDER — VALACYCLOVIR HYDROCHLORIDE 500 MG/1
500 TABLET, FILM COATED ORAL DAILY
Qty: 30 TABLET | Refills: 1 | Status: SHIPPED | OUTPATIENT
Start: 2022-01-26 | End: 2022-03-28

## 2022-01-26 RX ORDER — DEXTROAMPHETAMINE SACCHARATE, AMPHETAMINE ASPARTATE MONOHYDRATE, DEXTROAMPHETAMINE SULFATE AND AMPHETAMINE SULFATE 3.75; 3.75; 3.75; 3.75 MG/1; MG/1; MG/1; MG/1
15 CAPSULE, EXTENDED RELEASE ORAL EVERY MORNING
Qty: 30 CAPSULE | Refills: 0 | Status: SHIPPED | OUTPATIENT
Start: 2022-01-26 | End: 2022-03-01 | Stop reason: DRUGHIGH

## 2022-01-26 RX ORDER — ACYCLOVIR 200 MG/1
CAPSULE ORAL 2 TIMES DAILY
COMMUNITY
End: 2022-01-26

## 2022-02-26 ENCOUNTER — PATIENT MESSAGE (OUTPATIENT)
Dept: FAMILY MEDICINE CLINIC | Facility: CLINIC | Age: 52
End: 2022-02-26

## 2022-02-26 DIAGNOSIS — F90.9 ATTENTION DEFICIT HYPERACTIVITY DISORDER (ADHD), UNSPECIFIED ADHD TYPE: Primary | ICD-10-CM

## 2022-03-01 RX ORDER — DEXTROAMPHETAMINE SACCHARATE, AMPHETAMINE ASPARTATE MONOHYDRATE, DEXTROAMPHETAMINE SULFATE AND AMPHETAMINE SULFATE 5; 5; 5; 5 MG/1; MG/1; MG/1; MG/1
20 CAPSULE, EXTENDED RELEASE ORAL EVERY MORNING
Qty: 30 CAPSULE | Refills: 0 | Status: SHIPPED | OUTPATIENT
Start: 2022-03-01 | End: 2022-06-03 | Stop reason: SDUPTHER

## 2022-03-01 NOTE — TELEPHONE ENCOUNTER
From: Viviana Arzate  To: Donna Hood MD  Sent: 2/26/2022 1:28 PM CST  Subject: Medicine question     Before the next refill on adderall, could we look at moving up to 20mg?    I think it is starting to wain a little.     Thanks.

## 2022-03-21 ENCOUNTER — OFFICE VISIT (OUTPATIENT)
Dept: OBGYN CLINIC | Age: 52
End: 2022-03-21

## 2022-03-21 VITALS
HEIGHT: 60 IN | BODY MASS INDEX: 28.27 KG/M2 | HEART RATE: 61 BPM | SYSTOLIC BLOOD PRESSURE: 111 MMHG | DIASTOLIC BLOOD PRESSURE: 73 MMHG | WEIGHT: 144 LBS

## 2022-03-21 DIAGNOSIS — Z11.3 SCREEN FOR STD (SEXUALLY TRANSMITTED DISEASE): ICD-10-CM

## 2022-03-21 DIAGNOSIS — Z30.431 INTRAUTERINE DEVICE SURVEILLANCE: ICD-10-CM

## 2022-03-21 DIAGNOSIS — Z01.419 WELL WOMAN EXAM WITH ROUTINE GYNECOLOGICAL EXAM: Primary | ICD-10-CM

## 2022-03-21 DIAGNOSIS — Z12.4 SCREENING FOR CERVICAL CANCER: ICD-10-CM

## 2022-03-21 DIAGNOSIS — Z12.31 SCREENING MAMMOGRAM, ENCOUNTER FOR: ICD-10-CM

## 2022-03-21 DIAGNOSIS — N91.2 AMENORRHEA: ICD-10-CM

## 2022-03-21 DIAGNOSIS — R23.2 HOT FLASHES: ICD-10-CM

## 2022-03-21 DIAGNOSIS — Z11.51 SCREENING FOR HPV (HUMAN PAPILLOMAVIRUS): ICD-10-CM

## 2022-03-21 LAB
ESTRADIOL LEVEL: 690.3 PG/ML
FOLLICLE STIMULATING HORMONE: 5.2 MIU/ML
HEPATITIS B SURFACE ANTIGEN INTERPRETATION: NORMAL
HEPATITIS C ANTIBODY INTERPRETATION: NORMAL
HIV-1 P24 AG: NORMAL
PROGESTERONE LEVEL: 0.34 NG/ML
RAPID HIV 1&2: NORMAL

## 2022-03-21 ASSESSMENT — ENCOUNTER SYMPTOMS
GASTROINTESTINAL NEGATIVE: 1
RESPIRATORY NEGATIVE: 1
EYES NEGATIVE: 1

## 2022-03-21 NOTE — PROGRESS NOTES
Chris Rucker is a 54 y.o. female who presents today for her medical conditions/ complaints as noted below. Chris Rucker is c/o of Annual Exam        HPI  Pt presents today for pap smear and breast exam.  She also requests labs to check menopause. Amenorrhea since has iud, but having hot flashes now. Has new partner, would like to be checked for sti's. Last mammogram:    Last pap smear:  2021  Contraception:    :  2  Para:  2  AB:  0  Last bone density:  Never   Last colonoscopy:    No LMP recorded. (Menstrual status: Other - See Notes). [id number]    Past Medical History:   Diagnosis Date    Allergic rhinitis     Anxiety     Chest pain     admitted  to 2013 with negative stress testing    Depression     Dizziness     Fatigue     Headache(784.0)     Meningitis spinal     History of this when 25 months old    Mitral valve prolapse     Palpitations      Past Surgical History:   Procedure Laterality Date    COLONOSCOPY  2015    normal    COLONOSCOPY N/A 2016    Dr Danielle Esquivel, punctate erosions of the left colon, C Diff (+)--6 yr recall     Family History   Problem Relation Age of Onset    Coronary Art Dis Father     Heart Disease Father     Crohn's Disease Mother     Liver Disease Mother         alcohol induced    Coronary Art Dis Other         Family History    Colon Cancer Neg Hx     Colon Polyps Neg Hx     Esophageal Cancer Neg Hx     Liver Cancer Neg Hx     Rectal Cancer Neg Hx     Stomach Cancer Neg Hx      Social History     Tobacco Use    Smoking status: Never Smoker    Smokeless tobacco: Never Used   Substance Use Topics    Alcohol use:  Yes     Alcohol/week: 0.0 standard drinks     Comment: gloria once or twice per week       Current Outpatient Medications   Medication Sig Dispense Refill    acebutolol (SECTRAL) 200 MG capsule Take 1 capsule by mouth 2 times daily 60 capsule 5    Multiple Vitamins-Minerals (THERAPEUTIC MULTIVITAMIN-MINERALS) tablet Take 1 tablet by mouth daily      Lactobacillus (PROBIOTIC ACIDOPHILUS PO) Take by mouth      venlafaxine (EFFEXOR XR) 150 MG extended release capsule Take 150 mg by mouth daily 75mg HS  2    ipratropium (ATROVENT) 0.06 % nasal spray USE 2 SPRAYS IN EACH NOSTRIL THREE TIMES A DAY AS NEEDED FOR RHINITIS 15 mL 0    cyanocobalamin 1000 MCG/ML injection Inject 1 ml into muscle every two weeks 10 mL 0    Cholecalciferol (VITAMIN D3) 14583 UNITS CAPS Take 1 capsule by mouth once a week 12 capsule 3    B-D 3CC LUER-DIDI SYR 25GX1\" 25G X 1\" 3 ML MISC USE 5O INJECT B-12 AS PRESCRIBED 10 each 3    Levonorgestrel (MIRENA IU) by Intrauterine route.  ibuprofen (ADVIL;MOTRIN) 200 MG tablet Take 200 mg by mouth every 6 hours as needed. No current facility-administered medications for this visit. Allergies   Allergen Reactions    Sulfa Antibiotics      Vitals:    03/21/22 1043   BP: 111/73   Pulse: 61     Body mass index is 28.12 kg/m². Review of Systems   Constitutional: Positive for diaphoresis. HENT: Negative. Eyes: Negative. Respiratory: Negative. Cardiovascular: Negative. Gastrointestinal: Negative. Genitourinary: Negative for difficulty urinating, dyspareunia, dysuria, enuresis, frequency, hematuria, menstrual problem, pelvic pain, urgency and vaginal discharge. Musculoskeletal: Negative. Skin: Negative. Neurological: Negative. Psychiatric/Behavioral: Negative. Physical Exam  Vitals and nursing note reviewed. Constitutional:       General: She is not in acute distress. Appearance: She is well-developed. She is not diaphoretic. HENT:      Head: Normocephalic and atraumatic. Right Ear: External ear normal.      Left Ear: External ear normal.      Nose: Nose normal.   Eyes:      General: Lids are normal.         Right eye: No discharge. Left eye: No discharge.       Conjunctiva/sclera: Conjunctivae normal. Pupils: Pupils are equal, round, and reactive to light. Neck:      Thyroid: No thyroid mass or thyromegaly. Trachea: No tracheal deviation. Cardiovascular:      Rate and Rhythm: Normal rate and regular rhythm. Heart sounds: Normal heart sounds. No murmur heard. Pulmonary:      Effort: Pulmonary effort is normal.      Breath sounds: Normal breath sounds. No wheezing. Chest:   Breasts: Breasts are symmetrical.      Right: No inverted nipple, mass, nipple discharge, skin change, tenderness or supraclavicular adenopathy. Left: No inverted nipple, mass, nipple discharge, skin change, tenderness or supraclavicular adenopathy. Abdominal:      General: Bowel sounds are normal. There is no distension. Palpations: Abdomen is soft. There is no mass. Tenderness: There is no abdominal tenderness. Hernia: There is no hernia in the left inguinal area. Genitourinary:     Labia:         Right: No rash, tenderness or lesion. Left: No rash, tenderness or lesion. Vagina: No vaginal discharge or tenderness. Cervix: No cervical motion tenderness or discharge (normal cervical mucosa). Uterus: Not enlarged and not tender. Adnexa:         Right: No mass, tenderness or fullness. Left: No mass, tenderness or fullness. Rectum: No external hemorrhoid. Comments: Pap collected for cervical cytology; std collection, iud strings visualized at os  Musculoskeletal:         General: No tenderness. Normal range of motion. Cervical back: Normal range of motion and neck supple. Lymphadenopathy:      Cervical:      Right cervical: No superficial, deep or posterior cervical adenopathy. Left cervical: No superficial, deep or posterior cervical adenopathy. Upper Body:      Right upper body: No supraclavicular, pectoral or epitrochlear adenopathy. Left upper body: No supraclavicular, pectoral or epitrochlear adenopathy.    Skin:     General:

## 2022-03-23 LAB
C. TRACHOMATIS DNA,THIN PREP: NEGATIVE
N. GONORRHOEAE DNA, THIN PREP: NEGATIVE
TRICHOMONAS VAGINALIS DNA: NEGATIVE

## 2022-03-24 LAB — RPR: NORMAL

## 2022-03-26 DIAGNOSIS — B00.1 RECURRENT COLD SORES: ICD-10-CM

## 2022-03-28 ENCOUNTER — HOSPITAL ENCOUNTER (OUTPATIENT)
Dept: WOMENS IMAGING | Age: 52
Discharge: HOME | End: 2022-03-28
Payer: COMMERCIAL

## 2022-03-28 DIAGNOSIS — Z01.419 WELL WOMAN EXAM WITH ROUTINE GYNECOLOGICAL EXAM: ICD-10-CM

## 2022-03-28 DIAGNOSIS — Z12.31 SCREENING MAMMOGRAM, ENCOUNTER FOR: ICD-10-CM

## 2022-03-28 PROCEDURE — 77063 BREAST TOMOSYNTHESIS BI: CPT

## 2022-03-28 RX ORDER — VALACYCLOVIR HYDROCHLORIDE 500 MG/1
TABLET, FILM COATED ORAL
Qty: 30 TABLET | Refills: 1 | Status: SHIPPED | OUTPATIENT
Start: 2022-03-28 | End: 2022-06-03 | Stop reason: SDUPTHER

## 2022-03-29 ENCOUNTER — HOSPITAL ENCOUNTER (OUTPATIENT)
Dept: WOMENS IMAGING | Age: 52
Discharge: HOME | End: 2022-03-29
Payer: COMMERCIAL

## 2022-03-29 DIAGNOSIS — R92.8 ABNORMAL MAMMOGRAM: ICD-10-CM

## 2022-03-29 PROCEDURE — 76642 ULTRASOUND BREAST LIMITED: CPT

## 2022-04-26 RX ORDER — ACEBUTOLOL HYDROCHLORIDE 200 MG/1
CAPSULE ORAL
Qty: 60 CAPSULE | Refills: 5 | Status: SHIPPED | OUTPATIENT
Start: 2022-04-26 | End: 2022-07-04 | Stop reason: SDUPTHER

## 2022-05-16 ENCOUNTER — OFFICE VISIT (OUTPATIENT)
Dept: FAMILY MEDICINE CLINIC | Facility: CLINIC | Age: 52
End: 2022-05-16

## 2022-05-16 VITALS
HEIGHT: 60 IN | SYSTOLIC BLOOD PRESSURE: 110 MMHG | WEIGHT: 151 LBS | DIASTOLIC BLOOD PRESSURE: 80 MMHG | BODY MASS INDEX: 29.64 KG/M2 | OXYGEN SATURATION: 98 % | HEART RATE: 74 BPM | RESPIRATION RATE: 16 BRPM

## 2022-05-16 DIAGNOSIS — F90.9 ATTENTION DEFICIT HYPERACTIVITY DISORDER (ADHD), UNSPECIFIED ADHD TYPE: ICD-10-CM

## 2022-05-16 DIAGNOSIS — S93.492A SYNDESMOTIC ANKLE SPRAIN, LEFT, INITIAL ENCOUNTER: Primary | ICD-10-CM

## 2022-05-16 DIAGNOSIS — M25.472 SWOLLEN L ANKLE: ICD-10-CM

## 2022-05-16 DIAGNOSIS — M25.572 ACUTE LEFT ANKLE PAIN: ICD-10-CM

## 2022-05-16 PROBLEM — R07.9 CHEST PAIN: Status: ACTIVE | Noted: 2022-05-16

## 2022-05-16 PROBLEM — R20.0 BILATERAL HAND NUMBNESS: Status: ACTIVE | Noted: 2022-05-16

## 2022-05-16 PROBLEM — R06.02 SOB (SHORTNESS OF BREATH): Status: ACTIVE | Noted: 2017-02-07

## 2022-05-16 PROBLEM — R53.83 FATIGUE: Status: ACTIVE | Noted: 2022-05-16

## 2022-05-16 PROCEDURE — 96372 THER/PROPH/DIAG INJ SC/IM: CPT | Performed by: FAMILY MEDICINE

## 2022-05-16 PROCEDURE — 99213 OFFICE O/P EST LOW 20 MIN: CPT | Performed by: FAMILY MEDICINE

## 2022-05-16 RX ORDER — METHYLPREDNISOLONE ACETATE 40 MG/ML
40 INJECTION, SUSPENSION INTRA-ARTICULAR; INTRALESIONAL; INTRAMUSCULAR; SOFT TISSUE ONCE
Status: COMPLETED | OUTPATIENT
Start: 2022-05-16 | End: 2022-05-16

## 2022-05-16 RX ORDER — DEXTROAMPHETAMINE SACCHARATE, AMPHETAMINE ASPARTATE MONOHYDRATE, DEXTROAMPHETAMINE SULFATE AND AMPHETAMINE SULFATE 5; 5; 5; 5 MG/1; MG/1; MG/1; MG/1
20 CAPSULE, EXTENDED RELEASE ORAL EVERY MORNING
Qty: 30 CAPSULE | Refills: 0 | Status: CANCELLED | OUTPATIENT
Start: 2022-05-16

## 2022-05-16 RX ADMIN — METHYLPREDNISOLONE ACETATE 40 MG: 40 INJECTION, SUSPENSION INTRA-ARTICULAR; INTRALESIONAL; INTRAMUSCULAR; SOFT TISSUE at 08:55

## 2022-05-29 DIAGNOSIS — M25.472 LEFT ANKLE SWELLING: Primary | ICD-10-CM

## 2022-05-29 DIAGNOSIS — S93.492D SYNDESMOTIC ANKLE SPRAIN, LEFT, SUBSEQUENT ENCOUNTER: ICD-10-CM

## 2022-06-03 ENCOUNTER — PATIENT MESSAGE (OUTPATIENT)
Dept: FAMILY MEDICINE CLINIC | Facility: CLINIC | Age: 52
End: 2022-06-03

## 2022-06-03 DIAGNOSIS — F90.9 ATTENTION DEFICIT HYPERACTIVITY DISORDER (ADHD), UNSPECIFIED ADHD TYPE: ICD-10-CM

## 2022-06-03 DIAGNOSIS — B00.1 RECURRENT COLD SORES: ICD-10-CM

## 2022-06-03 DIAGNOSIS — R45.86 MOOD SWINGS: ICD-10-CM

## 2022-06-03 DIAGNOSIS — E55.9 VITAMIN D DEFICIENCY: ICD-10-CM

## 2022-06-03 RX ORDER — DEXTROAMPHETAMINE SACCHARATE, AMPHETAMINE ASPARTATE MONOHYDRATE, DEXTROAMPHETAMINE SULFATE AND AMPHETAMINE SULFATE 5; 5; 5; 5 MG/1; MG/1; MG/1; MG/1
20 CAPSULE, EXTENDED RELEASE ORAL EVERY MORNING
Qty: 30 CAPSULE | Refills: 0 | Status: SHIPPED | OUTPATIENT
Start: 2022-06-03 | End: 2022-07-04 | Stop reason: SDUPTHER

## 2022-06-03 RX ORDER — CYANOCOBALAMIN 1000 UG/ML
1000 INJECTION, SOLUTION INTRAMUSCULAR; SUBCUTANEOUS
COMMUNITY
Start: 2022-04-26

## 2022-06-03 RX ORDER — VENLAFAXINE HYDROCHLORIDE 150 MG/1
150 CAPSULE, EXTENDED RELEASE ORAL DAILY
Qty: 90 CAPSULE | Refills: 3 | Status: SHIPPED | OUTPATIENT
Start: 2022-06-03

## 2022-06-03 RX ORDER — VALACYCLOVIR HYDROCHLORIDE 500 MG/1
500 TABLET, FILM COATED ORAL DAILY
Qty: 90 TABLET | Refills: 3 | Status: SHIPPED | OUTPATIENT
Start: 2022-06-03

## 2022-06-03 RX ORDER — ERGOCALCIFEROL 1.25 MG/1
50000 CAPSULE ORAL WEEKLY
Qty: 12 CAPSULE | Refills: 3 | Status: SHIPPED | OUTPATIENT
Start: 2022-06-03

## 2022-06-03 NOTE — TELEPHONE ENCOUNTER
From: Viviana Arzate  To: Donna Hood MD  Sent: 6/3/2022 11:13 AM CDT  Subject: Prescription refill    When I was at the office, last time, I requested refills for all meds including Adderall. When I went to pickup my meds this week, they told me they were waiting on confirmation from my doctor. Can you please update the refills? Also, after this I would like to change my pharmacy [facility] in [facility]. [facility] is beginning to be very difficult to use.     Thank you.

## 2022-06-06 ENCOUNTER — LAB (OUTPATIENT)
Dept: LAB | Facility: HOSPITAL | Age: 52
End: 2022-06-06

## 2022-06-06 DIAGNOSIS — E55.9 VITAMIN D DEFICIENCY: ICD-10-CM

## 2022-06-06 DIAGNOSIS — R00.2 PALPITATIONS: ICD-10-CM

## 2022-06-06 DIAGNOSIS — I34.1 MITRAL VALVE PROLAPSE: ICD-10-CM

## 2022-06-06 DIAGNOSIS — F90.9 ATTENTION DEFICIT HYPERACTIVITY DISORDER (ADHD), UNSPECIFIED ADHD TYPE: ICD-10-CM

## 2022-06-06 DIAGNOSIS — Z00.00 LABORATORY TESTS ORDERED AS PART OF A COMPLETE PHYSICAL EXAM (CPE): ICD-10-CM

## 2022-06-06 DIAGNOSIS — E53.8 VITAMIN B 12 DEFICIENCY: ICD-10-CM

## 2022-06-06 LAB
25(OH)D3 SERPL-MCNC: 75.4 NG/ML (ref 30–100)
ALBUMIN SERPL-MCNC: 4.5 G/DL (ref 3.5–5.2)
ALBUMIN/GLOB SERPL: 1.7 G/DL
ALP SERPL-CCNC: 61 U/L (ref 39–117)
ALT SERPL W P-5'-P-CCNC: 22 U/L (ref 1–33)
AMPHET+METHAMPHET UR QL: POSITIVE
AMPHETAMINES UR QL: NEGATIVE
ANION GAP SERPL CALCULATED.3IONS-SCNC: 10.2 MMOL/L (ref 5–15)
AST SERPL-CCNC: 36 U/L (ref 1–32)
BARBITURATES UR QL SCN: NEGATIVE
BENZODIAZ UR QL SCN: NEGATIVE
BILIRUB SERPL-MCNC: 0.4 MG/DL (ref 0–1.2)
BILIRUB UR QL STRIP: NEGATIVE
BUN SERPL-MCNC: 13 MG/DL (ref 6–20)
BUN/CREAT SERPL: 13.4 (ref 7–25)
BUPRENORPHINE SERPL-MCNC: NEGATIVE NG/ML
CALCIUM SPEC-SCNC: 9.4 MG/DL (ref 8.6–10.5)
CANNABINOIDS SERPL QL: NEGATIVE
CHLORIDE SERPL-SCNC: 104 MMOL/L (ref 98–107)
CHOLEST SERPL-MCNC: 199 MG/DL (ref 0–200)
CLARITY UR: CLEAR
CO2 SERPL-SCNC: 26.8 MMOL/L (ref 22–29)
COCAINE UR QL: NEGATIVE
COLOR UR: ABNORMAL
CREAT SERPL-MCNC: 0.97 MG/DL (ref 0.57–1)
DEPRECATED RDW RBC AUTO: 41.9 FL (ref 37–54)
EGFRCR SERPLBLD CKD-EPI 2021: 71.8 ML/MIN/1.73
ERYTHROCYTE [DISTWIDTH] IN BLOOD BY AUTOMATED COUNT: 11.6 % (ref 12.3–15.4)
GLOBULIN UR ELPH-MCNC: 2.6 GM/DL
GLUCOSE SERPL-MCNC: 75 MG/DL (ref 65–99)
GLUCOSE UR STRIP-MCNC: NEGATIVE MG/DL
HCT VFR BLD AUTO: 43.2 % (ref 34–46.6)
HDLC SERPL-MCNC: 50 MG/DL (ref 40–60)
HGB BLD-MCNC: 14.3 G/DL (ref 12–15.9)
HGB UR QL STRIP.AUTO: NEGATIVE
KETONES UR QL STRIP: ABNORMAL
LDLC SERPL CALC-MCNC: 117 MG/DL (ref 0–100)
LDLC/HDLC SERPL: 2.26 {RATIO}
LEUKOCYTE ESTERASE UR QL STRIP.AUTO: NEGATIVE
MCH RBC QN AUTO: 32.4 PG (ref 26.6–33)
MCHC RBC AUTO-ENTMCNC: 33.1 G/DL (ref 31.5–35.7)
MCV RBC AUTO: 98 FL (ref 79–97)
METHADONE UR QL SCN: NEGATIVE
NITRITE UR QL STRIP: NEGATIVE
OPIATES UR QL: NEGATIVE
OXYCODONE UR QL SCN: NEGATIVE
PCP UR QL SCN: NEGATIVE
PH UR STRIP.AUTO: 5.5 [PH] (ref 5–8)
PLATELET # BLD AUTO: 268 10*3/MM3 (ref 140–450)
PMV BLD AUTO: 10.7 FL (ref 6–12)
POTASSIUM SERPL-SCNC: 4.2 MMOL/L (ref 3.5–5.2)
PROPOXYPH UR QL: NEGATIVE
PROT SERPL-MCNC: 7.1 G/DL (ref 6–8.5)
PROT UR QL STRIP: NEGATIVE
RBC # BLD AUTO: 4.41 10*6/MM3 (ref 3.77–5.28)
SODIUM SERPL-SCNC: 141 MMOL/L (ref 136–145)
SP GR UR STRIP: 1.03 (ref 1–1.03)
TRICYCLICS UR QL SCN: NEGATIVE
TRIGL SERPL-MCNC: 181 MG/DL (ref 0–150)
TSH SERPL DL<=0.05 MIU/L-ACNC: 1.69 UIU/ML (ref 0.27–4.2)
UROBILINOGEN UR QL STRIP: ABNORMAL
VIT B12 BLD-MCNC: 562 PG/ML (ref 211–946)
VLDLC SERPL-MCNC: 32 MG/DL (ref 5–40)
WBC NRBC COR # BLD: 7.23 10*3/MM3 (ref 3.4–10.8)

## 2022-06-06 PROCEDURE — 80306 DRUG TEST PRSMV INSTRMNT: CPT

## 2022-06-06 PROCEDURE — 80050 GENERAL HEALTH PANEL: CPT

## 2022-06-06 PROCEDURE — 82306 VITAMIN D 25 HYDROXY: CPT

## 2022-06-06 PROCEDURE — 80061 LIPID PANEL: CPT

## 2022-06-06 PROCEDURE — 82607 VITAMIN B-12: CPT

## 2022-06-06 PROCEDURE — 36415 COLL VENOUS BLD VENIPUNCTURE: CPT

## 2022-06-06 PROCEDURE — 81003 URINALYSIS AUTO W/O SCOPE: CPT

## 2022-06-06 NOTE — PROGRESS NOTES
Chief Complaint  Ankle Pain (Pt states she is having left ankle pain with swelling. )    Subjective        History of Present Illness  Viviana Arzate is a 51 y.o. female who presents to [facility] - established patient.    Here with recurrent L ankle pain.    Pt has had instability and strain of her ankle from wearing heels at her daughter's wedding last Fall.  Sx have recurred now after a trip to [place] and walking in heels.    She is using ice and rest and IBU without enough relief.  Has noted swelling but feels tight and swollen inside the ankle to her as well.  Sx are somewhat better in flat-bottomed shoe or one with laces/support of ankle.    No recent falls.  Has not had imaging or PT.    Past Medical History:   Diagnosis Date   • Allergic rhinitis    • Anxiety    • IBS (irritable bowel syndrome)    • MVP (mitral valve prolapse)    • Vitamin D deficiency        Outpatient Medications Prior to Visit   Medication Sig Dispense Refill   • acebutolol (SECTRAL) 200 MG capsule Take 200 mg by mouth 2 (Two) Times a Day.     • ALPRAZolam (XANAX) 0.25 MG tablet TAKE ONE TABLET BY MOUTH DAILY AS NEEDED FOR ANXIETY 15 tablet 1   • ipratropium (ATROVENT) 0.06 % nasal spray Put 2 sprays in each nostril three times a day to control allergy symptoms and congestion. 45 mL 3   • levonorgestrel (MIRENA) 20 MCG/24HR IUD 1 each by Intrauterine route 1 (One) Time.     • Probiotic Product (PROBIOTIC ADVANCED PO) Take  by mouth Daily.     • amphetamine-dextroamphetamine XR (Adderall XR) 20 MG 24 hr capsule Take 1 capsule by mouth Every Morning For improved focus and attention 30 capsule 0   • Cyanocobalamin 1000 MCG/ML kit Inject 1 mL as directed Every 14 (Fourteen) Days. 2 kit 11   • valACYclovir (VALTREX) 500 MG tablet TAKE ONE TABLET BY MOUTH DAILY 30 tablet 1   • venlafaxine XR (EFFEXOR-XR) 150 MG 24 hr capsule Take 1 capsule by mouth Daily. 30 capsule 5   • vitamin D (ERGOCALCIFEROL) 1.25 MG (22637  "UT) capsule capsule Take 1 capsule by mouth 1 (One) Time Per Week. 12 capsule 3   • silver sulfadiazine (SILVADENE, SSD) 1 % cream Apply 1 application topically to the appropriate area as directed 2 (Two) Times a Day. 60 each 0   • lactobacillus acidophilus (RISAQUAD) capsule capsule Take  by mouth.       No facility-administered medications prior to visit.        Objective   Vital Signs:   /80 (BP Location: Left arm, Patient Position: Sitting, Cuff Size: Adult)   Pulse 74   Resp 16   Ht 152.4 cm (60\")   Wt 68.5 kg (151 lb)   SpO2 98%   BMI 29.49 kg/m²       Physical Exam   WD, WN woman ambulatory and in NAD except for TTP on the L ankle anteriorly and on some of the ligaments medially and laterally in the ankle.  No bruising.  Has FROM but hurts to F/E the ankle > invert and roberto. No increased warmth to ankle or foot.  Foot has normal circulation and appearance.  No deformities. Calf supple on the L.  R ankle and calf normal on exam.         Assessment and Plan    Diagnoses and all orders for this visit:    1. Syndesmotic ankle sprain, left, initial encounter (Primary)  -     methylPREDNISolone acetate (DEPO-medrol) injection 40 mg    2. Swollen L ankle  -     methylPREDNISolone acetate (DEPO-medrol) injection 40 mg    3. Acute left ankle pain  -     methylPREDNISolone acetate (DEPO-medrol) injection 40 mg    4. Attention deficit hyperactivity disorder (ADHD), unspecified ADHD type    Will be changing pharmacies after her recent move.  Will need ADHD meds sent to [facility] instead of [facility] on next fill.  Meds going okay.        Follow Up   Return for Next scheduled follow up.    Patient was given instructions and counseling regarding her condition or for health maintenance advice.   - Wear firm bottomed shoes without heel  - Ice the ankle for 10 minutes at least 3 times a day  - Use Ibuprofen 600 mg three times a day with food starting again tonight since we did the steroid shot in the " office today  - Avoid excessive walking  - Elevate the ankle as much as you can  - If you develop any calf swelling, let Dr. Hood know  - Tylenol is okay for pain relief too even while on the ibuprofen  - Voltaren Gel ( Diclofenac) 1 % over the counter can be used to rub on the ankle where the pain is up to 3 times a day too.  - If ankle is not better in 5-7 days, we will refer you to physical therapy  - Get a neoprene type ankle brace with a wrapping compressive strap to prevent swelling and add in more ankle support/stabilization.    Please see specific information/handouts pulled into the AVS if appropriate.       Donna Hood M.D.  [facility]

## 2022-06-16 ENCOUNTER — OFFICE VISIT (OUTPATIENT)
Dept: CARDIOLOGY CLINIC | Age: 52
End: 2022-06-16
Payer: COMMERCIAL

## 2022-06-16 VITALS
BODY MASS INDEX: 28.35 KG/M2 | SYSTOLIC BLOOD PRESSURE: 118 MMHG | HEIGHT: 60 IN | DIASTOLIC BLOOD PRESSURE: 78 MMHG | HEART RATE: 65 BPM | WEIGHT: 144.4 LBS

## 2022-06-16 DIAGNOSIS — I34.1 MVP (MITRAL VALVE PROLAPSE): ICD-10-CM

## 2022-06-16 DIAGNOSIS — R00.2 PALPITATIONS: Primary | ICD-10-CM

## 2022-06-16 PROCEDURE — 99214 OFFICE O/P EST MOD 30 MIN: CPT | Performed by: NURSE PRACTITIONER

## 2022-06-16 PROCEDURE — 93000 ELECTROCARDIOGRAM COMPLETE: CPT | Performed by: NURSE PRACTITIONER

## 2022-06-16 ASSESSMENT — ENCOUNTER SYMPTOMS
CHEST TIGHTNESS: 0
SORE THROAT: 0
WHEEZING: 0
COUGH: 0
SHORTNESS OF BREATH: 0

## 2022-06-16 NOTE — PROGRESS NOTES
[facility] Cardiology   Established Patient Office Visit  [address]  [address] [address] [facility]  [phone]        OFFICE VISIT:  2022    Duane Fendt - : 1970    Reason For Visit:  Erik Marley is a 54 y.o. female who is here for 6 Month Follow-Up    1. Palpitations    2. MVP (mitral valve prolapse)      Patient with a history of palpitations and mitral valve prolapse. She is a patient of Dr. Pj Negron. Patient presents to clinic today for routine follow-up. She denies any chest pain, pressure or tightness. There is no shortness of breath, orthopnea or PND. Patient denies any lightheadedness, dizziness or syncope.         Subjective    Duane Fendt is a 54 y.o. female with the following history as recorded in [facility]:    Patient Active Problem List    Diagnosis Date Noted    Bilateral hand numbness     SOB (shortness of breath) 2017    Mild mitral regurgitation by prior echocardiogram 2017    History of Clostridium difficile colitis 2016    LLQ abdominal pain 2016    Rectal bleeding 2016    Change in bowel habits 2016    Diarrhea 2016    Abnormal CT of the abdomen 2016    Colon wall thickening 2016    Chronic constipation 10/23/2015    Family history of ischemic heart disease 2012    Allergic rhinitis 2012    Fatigue     Palpitations     Mitral valve prolapse     Chest pain      Current Outpatient Medications   Medication Sig Dispense Refill    acebutolol (SECTRAL) 200 MG capsule TAKE ONE CAPSULE BY MOUTH TWICE A DAY 60 capsule 5    Multiple Vitamins-Minerals (THERAPEUTIC MULTIVITAMIN-MINERALS) tablet Take 1 tablet by mouth daily      Lactobacillus (PROBIOTIC ACIDOPHILUS PO) Take by mouth      venlafaxine (EFFEXOR XR) 150 MG extended release capsule Take 150 mg by mouth daily 75mg HS  2    ipratropium (ATROVENT) 0.06 % nasal spray USE 2 SPRAYS IN EACH NOSTRIL THREE TIMES A DAY AS NEEDED FOR RHINITIS 15 mL 0    cyanocobalamin 1000 MCG/ML injection Inject 1 ml into muscle every two weeks 10 mL 0    Cholecalciferol (VITAMIN D3) 66309 UNITS CAPS Take 1 capsule by mouth once a week 12 capsule 3    B-D 3CC LUER-DIDI SYR 25GX1\" 25G X 1\" 3 ML MISC USE 5O INJECT B-12 AS PRESCRIBED 10 each 3    Levonorgestrel (MIRENA IU) by Intrauterine route.  ibuprofen (ADVIL;MOTRIN) 200 MG tablet Take 200 mg by mouth every 6 hours as needed. No current facility-administered medications for this visit. Allergies: Sulfa antibiotics  Past Medical History:   Diagnosis Date    Allergic rhinitis     Anxiety     Chest pain     admitted 12-13 to 12- with negative stress testing    Depression     Dizziness     Fatigue     Headache(784.0)     Meningitis spinal     History of this when 21 months old    Mitral valve prolapse     Palpitations      Past Surgical History:   Procedure Laterality Date    COLONOSCOPY  6/2015    normal    COLONOSCOPY N/A 9/27/2016    Dr Jonas Back, punctate erosions of the left colon, C Diff (+)--6 yr recall     Family History   Problem Relation Age of Onset    Coronary Art Dis Father     Heart Disease Father     Crohn's Disease Mother     Liver Disease Mother         alcohol induced    Coronary Art Dis Other         Family History    Colon Cancer Neg Hx     Colon Polyps Neg Hx     Esophageal Cancer Neg Hx     Liver Cancer Neg Hx     Rectal Cancer Neg Hx     Stomach Cancer Neg Hx      Social History     Tobacco Use    Smoking status: Never Smoker    Smokeless tobacco: Never Used   Substance Use Topics    Alcohol use: Yes     Alcohol/week: 0.0 standard drinks     Comment: gloria once or twice per week          Review of Systems:    Review of Systems   Constitutional: Negative for activity change, chills, diaphoresis, fatigue and fever. HENT: Negative for congestion and sore throat.     Respiratory: Negative for cough, chest tightness, shortness of breath and wheezing. Cardiovascular: Negative for chest pain, palpitations and leg swelling. Neurological: Negative for dizziness, syncope, light-headedness and headaches. Psychiatric/Behavioral: Negative for confusion. The patient is not nervous/anxious. Objective:    /78   Pulse 65   Ht 5' (1.524 m)   Wt 144 lb 6.4 oz (65.5 kg)   BMI 28.20 kg/m²     GENERAL - well developed and well nourished, conversant  HEENT -  PERRLA, Hearing appears normal, gentleman lids are normal.  External inspection of ears and nose appear normal.  NECK - no thyromegaly, no JVD, trachea is in the midline  CARDIOVASCULAR - PMI is in the mid line clavicular position, Normal S1 and S2 with no  systolic murmur. No S3 or S4    PULMONARY - no respiratory distress. No wheezes or rales. Lungs are clear to ausculation, normal respiratory effort. ABDOMEN  - soft, non tender, no rebound  MUSCULOSKELETAL  - range of motion of the upper and lower extermites appears normal and equal and is without pain   EXTREMITIES - no significant edema   NEUROLOGIC - gait and station are normal  SKIN - turgor is normal, can warm and dry. PSYCHIATRIC - normal mood and affect, alert and orientated x 3,      ASSESSMENT:    ALL THE CARDIOLOGY PROBLEMS ARE LISTED ABOVE; HOWEVER, THE FOLLOWING SPECIFIC CARDIAC PROBLEMS / CONDITIONS WERE ADDRESSED AND TREATED DURING THE OFFICE VISIT TODAY:                                                                                            MEDICAL DECISION MAKING             Cardiac Specific Problem / Diagnosis  Discussion and Data Reviewed Diagnostic Procedures Ordered Management Options Selected           1. Palpitations  Well Controlled   Review and summation of old records:    Asymptomatic on acebutolol. EKG in the office today showing sinus rhythm. Yes Continue current medications:     Yes           2. History of mitral valve prolapse  Stable   Asymptomatic.  No Continue current medications: Yes                                     Orders Placed This Encounter   Procedures    EKG 12 lead     Order Specific Question:   Reason for Exam?     Answer: Other     No orders of the defined types were placed in this encounter. Discussed with patient. Return in about 1 year (around 6/16/2023) for Dr Debo Norman. I greatly appreciate the opportunity to meet Diana Shanda and your confidence in allowing me to participate in her cardiovascular care. LUCI Cuellar NP  6/16/2022 8:33 AM CDT                    This dictation was generated by voice recognition computer software. Although all attempts are made to edit dictation for accuracy, there may be errors in the transcription that are not intended.

## 2022-06-29 ENCOUNTER — TELEPHONE (OUTPATIENT)
Dept: GASTROENTEROLOGY | Facility: CLINIC | Age: 52
End: 2022-06-29

## 2022-06-29 NOTE — TELEPHONE ENCOUNTER
----- Message from Viviana Huey sent at 6/29/2022 12:17 PM CDT -----  Regarding: Episode  I have not had an episode like this since the last time I was in the office and saw Deepti Cox. Today I woke up at 4am and began pooping. I did not stop until 5am, went back to bed and I was up and down for the next three hours. At the end, I pooped out bright red blood. I can see three visible hemorrhoids and I believe that is where the red blood is coming from. Or from the stuff that sticks out of the anus when straining. I took a phenegren at 7:30 and it has helped. I am still having cramps in the lower quadrant of my stomach and it feels like I need to poop, but nothing is coming out. What should I do next, schedule an appointment?

## 2022-06-29 NOTE — TELEPHONE ENCOUNTER
She can certainly make an appointment to see us however I can order stool cultures and get labs in the meantime to begin workup.   Thank you   Deepti CONNOR

## 2022-06-30 ENCOUNTER — LAB (OUTPATIENT)
Dept: LAB | Facility: HOSPITAL | Age: 52
End: 2022-06-30

## 2022-06-30 DIAGNOSIS — R19.7 DIARRHEA IN ADULT PATIENT: ICD-10-CM

## 2022-06-30 DIAGNOSIS — R19.7 DIARRHEA IN ADULT PATIENT: Primary | ICD-10-CM

## 2022-06-30 LAB
ADV 40+41 DNA STL QL NAA+NON-PROBE: NOT DETECTED
ALBUMIN SERPL-MCNC: 4.5 G/DL (ref 3.5–5.2)
ALBUMIN/GLOB SERPL: 1.8 G/DL
ALP SERPL-CCNC: 57 U/L (ref 39–117)
ALT SERPL W P-5'-P-CCNC: 13 U/L (ref 1–33)
ANION GAP SERPL CALCULATED.3IONS-SCNC: 7.4 MMOL/L (ref 5–15)
AST SERPL-CCNC: 26 U/L (ref 1–32)
ASTRO TYP 1-8 RNA STL QL NAA+NON-PROBE: NOT DETECTED
BASOPHILS # BLD AUTO: 0.06 10*3/MM3 (ref 0–0.2)
BASOPHILS NFR BLD AUTO: 0.5 % (ref 0–1.5)
BILIRUB SERPL-MCNC: 0.5 MG/DL (ref 0–1.2)
BUN SERPL-MCNC: 10 MG/DL (ref 6–20)
BUN/CREAT SERPL: 9.1 (ref 7–25)
C CAYETANENSIS DNA STL QL NAA+NON-PROBE: NOT DETECTED
C COLI+JEJ+UPSA DNA STL QL NAA+NON-PROBE: NOT DETECTED
C DIFF TOX GENS STL QL NAA+PROBE: NEGATIVE
CALCIUM SPEC-SCNC: 9.4 MG/DL (ref 8.6–10.5)
CHLORIDE SERPL-SCNC: 102 MMOL/L (ref 98–107)
CO2 SERPL-SCNC: 29.6 MMOL/L (ref 22–29)
CREAT SERPL-MCNC: 1.1 MG/DL (ref 0.57–1)
CRP SERPL-MCNC: 3.83 MG/DL (ref 0–0.5)
CRYPTOSP DNA STL QL NAA+NON-PROBE: NOT DETECTED
DEPRECATED RDW RBC AUTO: 38.4 FL (ref 37–54)
E HISTOLYT DNA STL QL NAA+NON-PROBE: NOT DETECTED
EAEC PAA PLAS AGGR+AATA ST NAA+NON-PRB: NOT DETECTED
EC STX1+STX2 GENES STL QL NAA+NON-PROBE: NOT DETECTED
EGFRCR SERPLBLD CKD-EPI 2021: 61.7 ML/MIN/1.73
EOSINOPHIL # BLD AUTO: 0.16 10*3/MM3 (ref 0–0.4)
EOSINOPHIL NFR BLD AUTO: 1.4 % (ref 0.3–6.2)
EPEC EAE GENE STL QL NAA+NON-PROBE: NOT DETECTED
ERYTHROCYTE [DISTWIDTH] IN BLOOD BY AUTOMATED COUNT: 11.3 % (ref 12.3–15.4)
ERYTHROCYTE [SEDIMENTATION RATE] IN BLOOD: 12 MM/HR (ref 0–20)
ETEC LTA+ST1A+ST1B TOX ST NAA+NON-PROBE: NOT DETECTED
G LAMBLIA DNA STL QL NAA+NON-PROBE: NOT DETECTED
GLOBULIN UR ELPH-MCNC: 2.5 GM/DL
GLUCOSE SERPL-MCNC: 84 MG/DL (ref 65–99)
HCT VFR BLD AUTO: 39.1 % (ref 34–46.6)
HGB BLD-MCNC: 13.6 G/DL (ref 12–15.9)
IMM GRANULOCYTES # BLD AUTO: 0.04 10*3/MM3 (ref 0–0.05)
IMM GRANULOCYTES NFR BLD AUTO: 0.3 % (ref 0–0.5)
LYMPHOCYTES # BLD AUTO: 2.22 10*3/MM3 (ref 0.7–3.1)
LYMPHOCYTES NFR BLD AUTO: 19.1 % (ref 19.6–45.3)
MCH RBC QN AUTO: 33 PG (ref 26.6–33)
MCHC RBC AUTO-ENTMCNC: 34.8 G/DL (ref 31.5–35.7)
MCV RBC AUTO: 94.9 FL (ref 79–97)
MONOCYTES # BLD AUTO: 0.88 10*3/MM3 (ref 0.1–0.9)
MONOCYTES NFR BLD AUTO: 7.6 % (ref 5–12)
NEUTROPHILS NFR BLD AUTO: 71.1 % (ref 42.7–76)
NEUTROPHILS NFR BLD AUTO: 8.29 10*3/MM3 (ref 1.7–7)
NOROVIRUS GI+II RNA STL QL NAA+NON-PROBE: NOT DETECTED
NRBC BLD AUTO-RTO: 0.1 /100 WBC (ref 0–0.2)
P SHIGELLOIDES DNA STL QL NAA+NON-PROBE: NOT DETECTED
PLATELET # BLD AUTO: 259 10*3/MM3 (ref 140–450)
PMV BLD AUTO: 10.3 FL (ref 6–12)
POTASSIUM SERPL-SCNC: 4.1 MMOL/L (ref 3.5–5.2)
PROT SERPL-MCNC: 7 G/DL (ref 6–8.5)
RBC # BLD AUTO: 4.12 10*6/MM3 (ref 3.77–5.28)
RVA RNA STL QL NAA+NON-PROBE: NOT DETECTED
S ENT+BONG DNA STL QL NAA+NON-PROBE: NOT DETECTED
SAPO I+II+IV+V RNA STL QL NAA+NON-PROBE: NOT DETECTED
SHIGELLA SP+EIEC IPAH ST NAA+NON-PROBE: NOT DETECTED
SODIUM SERPL-SCNC: 139 MMOL/L (ref 136–145)
V CHOL+PARA+VUL DNA STL QL NAA+NON-PROBE: NOT DETECTED
V CHOLERAE DNA STL QL NAA+NON-PROBE: NOT DETECTED
WBC NRBC COR # BLD: 11.65 10*3/MM3 (ref 3.4–10.8)
Y ENTEROCOL DNA STL QL NAA+NON-PROBE: NOT DETECTED

## 2022-06-30 PROCEDURE — 87209 SMEAR COMPLEX STAIN: CPT | Performed by: CLINICAL NURSE SPECIALIST

## 2022-06-30 PROCEDURE — 80053 COMPREHEN METABOLIC PANEL: CPT | Performed by: CLINICAL NURSE SPECIALIST

## 2022-06-30 PROCEDURE — 85025 COMPLETE CBC W/AUTO DIFF WBC: CPT | Performed by: CLINICAL NURSE SPECIALIST

## 2022-06-30 PROCEDURE — 36415 COLL VENOUS BLD VENIPUNCTURE: CPT

## 2022-06-30 PROCEDURE — 87493 C DIFF AMPLIFIED PROBE: CPT | Performed by: CLINICAL NURSE SPECIALIST

## 2022-06-30 PROCEDURE — 85652 RBC SED RATE AUTOMATED: CPT | Performed by: CLINICAL NURSE SPECIALIST

## 2022-06-30 PROCEDURE — 87507 IADNA-DNA/RNA PROBE TQ 12-25: CPT | Performed by: CLINICAL NURSE SPECIALIST

## 2022-06-30 PROCEDURE — 87177 OVA AND PARASITES SMEARS: CPT | Performed by: CLINICAL NURSE SPECIALIST

## 2022-06-30 PROCEDURE — 83630 LACTOFERRIN FECAL (QUAL): CPT

## 2022-06-30 PROCEDURE — 86140 C-REACTIVE PROTEIN: CPT

## 2022-06-30 RX ORDER — METRONIDAZOLE 250 MG/1
250 TABLET ORAL 3 TIMES DAILY
Qty: 30 TABLET | Refills: 0 | Status: SHIPPED | OUTPATIENT
Start: 2022-06-30 | End: 2022-07-10

## 2022-07-01 LAB — LACTOFERRIN STL QL LA: POSITIVE

## 2022-07-02 ENCOUNTER — PATIENT MESSAGE (OUTPATIENT)
Dept: FAMILY MEDICINE CLINIC | Facility: CLINIC | Age: 52
End: 2022-07-02

## 2022-07-02 DIAGNOSIS — E53.8 B12 DEFICIENCY: Primary | ICD-10-CM

## 2022-07-04 DIAGNOSIS — F90.9 ATTENTION DEFICIT HYPERACTIVITY DISORDER (ADHD), UNSPECIFIED ADHD TYPE: ICD-10-CM

## 2022-07-05 ENCOUNTER — TELEPHONE (OUTPATIENT)
Dept: GASTROENTEROLOGY | Facility: CLINIC | Age: 52
End: 2022-07-05

## 2022-07-05 RX ORDER — ACEBUTOLOL HYDROCHLORIDE 200 MG/1
200 CAPSULE ORAL 2 TIMES DAILY
Qty: 60 CAPSULE | Refills: 5 | Status: SHIPPED | OUTPATIENT
Start: 2022-07-05 | End: 2022-11-01

## 2022-07-05 NOTE — TELEPHONE ENCOUNTER
Rx Refill Note  Requested Prescriptions     Pending Prescriptions Disp Refills   • amphetamine-dextroamphetamine XR (Adderall XR) 20 MG 24 hr capsule 30 capsule 0     Sig: Take 1 capsule by mouth Every Morning For improved focus and attention      Last office visit with prescribing clinician: 5/16/2022      Next office visit with prescribing clinician: 9/26/2022            ILIA Longo  07/05/22, 13:43 CDT

## 2022-07-05 NOTE — TELEPHONE ENCOUNTER
From: Viviana Arzate  To: Donna Hood MD  Sent: 7/2/2022 7:10 AM CDT  Subject: Blood work    I had some bloodwork on June 29 due to a severe IBS issue. Dr. Jarvis office requested it. However, when you have time, please review and see what you think. It seems their office makes their money on colonoscopies, not looking holistically at the patient. This type of episode seems to happen to me about once every two or three years.     Thanks

## 2022-07-05 NOTE — TELEPHONE ENCOUNTER
Spoke with the patient this morning, she states she is starting to feel better. We scheduled her an office visit to come in and discuss her symptoms in person. She was advised to call our office if her symptoms worsen before her office visit. Understanding was voiced.

## 2022-07-05 NOTE — TELEPHONE ENCOUNTER
----- Message from Viviana Arzate sent at 7/2/2022  7:06 AM CDT -----  Regarding: Episode  I have taken the metronidazole for two days and I have had a constant headache, dizziness/imbalance, and brain fog. I know it is the weekend and a holiday, but I wanted to let you know. I have stopped taking it.

## 2022-07-06 LAB
O+P SPEC MICRO: NORMAL
O+P STL CONC: NORMAL

## 2022-07-06 RX ORDER — DEXTROAMPHETAMINE SACCHARATE, AMPHETAMINE ASPARTATE MONOHYDRATE, DEXTROAMPHETAMINE SULFATE AND AMPHETAMINE SULFATE 5; 5; 5; 5 MG/1; MG/1; MG/1; MG/1
20 CAPSULE, EXTENDED RELEASE ORAL EVERY MORNING
Qty: 30 CAPSULE | Refills: 0 | Status: SHIPPED | OUTPATIENT
Start: 2022-07-06 | End: 2022-08-07 | Stop reason: SDUPTHER

## 2022-07-08 ENCOUNTER — OFFICE VISIT (OUTPATIENT)
Dept: GASTROENTEROLOGY | Facility: CLINIC | Age: 52
End: 2022-07-08

## 2022-07-08 VITALS
BODY MASS INDEX: 28.66 KG/M2 | DIASTOLIC BLOOD PRESSURE: 78 MMHG | HEART RATE: 68 BPM | SYSTOLIC BLOOD PRESSURE: 108 MMHG | HEIGHT: 60 IN | WEIGHT: 146 LBS | TEMPERATURE: 98.6 F | OXYGEN SATURATION: 98 %

## 2022-07-08 DIAGNOSIS — Z78.9 NONSMOKER: ICD-10-CM

## 2022-07-08 DIAGNOSIS — E66.9 OBESITY, UNSPECIFIED OBESITY SEVERITY, UNSPECIFIED OBESITY TYPE: ICD-10-CM

## 2022-07-08 DIAGNOSIS — K62.5 BRBPR (BRIGHT RED BLOOD PER RECTUM): ICD-10-CM

## 2022-07-08 DIAGNOSIS — R19.7 DIARRHEA IN ADULT PATIENT: Primary | ICD-10-CM

## 2022-07-08 PROCEDURE — 99214 OFFICE O/P EST MOD 30 MIN: CPT | Performed by: CLINICAL NURSE SPECIALIST

## 2022-07-08 NOTE — PROGRESS NOTES
Viviana Arzate  1970 7/13/2022  Chief Complaint   Patient presents with   • GI Problem     Feels like she had colitis rlq, she is slightly tender to touch     Subjective   HPI  Viviana Arzate is a 51 y.o. female who presents with a complaint of acute onset of abdominal pain and diarrhea. She contacted our office and I ordered stool cultures and labs. I also put her on Flagyl since the holiday weekend was beginning. She hurts in the right lower abdomen and a severe cramps. It was constant for 2 days. She was having diarrhea every 30-40 minutes and then followed by blood. Symptoms are moderate to severe for her last a few days. The diarrhea is better at this time. Results reviewed. None at this time at follow up however this seems to be a recurrent symptoms that she has had in the past. She has been under a lot of stress. Her last colonoscopy was in 2019.      Past Medical History:   Diagnosis Date   • Allergic rhinitis    • Anxiety    • IBS (irritable bowel syndrome)    • MVP (mitral valve prolapse)    • Vitamin D deficiency      Past Surgical History:   Procedure Laterality Date   • COLONOSCOPY     • COLONOSCOPY N/A 7/1/2019    Negative for colitis       Outpatient Medications Marked as Taking for the 7/8/22 encounter (Office Visit) with Deepti Cox APRN   Medication Sig Dispense Refill   • acebutolol (SECTRAL) 200 MG capsule Take 200 mg by mouth 2 (Two) Times a Day.     • ALPRAZolam (XANAX) 0.25 MG tablet TAKE ONE TABLET BY MOUTH DAILY AS NEEDED FOR ANXIETY 15 tablet 1   • amphetamine-dextroamphetamine XR (Adderall XR) 20 MG 24 hr capsule Take 1 capsule by mouth Every Morning For improved focus and attention 30 capsule 0   • cyanocobalamin 1000 MCG/ML injection      • ipratropium (ATROVENT) 0.06 % nasal spray Put 2 sprays in each nostril three times a day to control allergy symptoms and congestion. 45 mL 3   • levonorgestrel (MIRENA) 20 MCG/24HR IUD 1 each by Intrauterine route 1 (One) Time.     •  Probiotic Product (PROBIOTIC ADVANCED PO) Take  by mouth Daily.     • valACYclovir (VALTREX) 500 MG tablet Take 1 tablet by mouth Daily. 90 tablet 3   • venlafaxine XR (EFFEXOR-XR) 150 MG 24 hr capsule Take 1 capsule by mouth Daily. For mood 90 capsule 3   • vitamin D (ERGOCALCIFEROL) 1.25 MG (33699 UT) capsule capsule Take 1 capsule by mouth 1 (One) Time Per Week. 12 capsule 3     Allergies   Allergen Reactions   • Sulfa Antibiotics Other (See Comments)     Social History     Socioeconomic History   • Marital status:    Tobacco Use   • Smoking status: Never Smoker   • Smokeless tobacco: Never Used   Substance and Sexual Activity   • Alcohol use: Yes     Comment: 3- 4 glasses of wine a week   • Drug use: No   • Sexual activity: Defer     Family History   Problem Relation Age of Onset   • Colon cancer Neg Hx    • Colon polyps Neg Hx      Health Maintenance   Topic Date Due   • COVID-19 Vaccine (3 - Booster for Tom series) 04/27/2022   • ANNUAL PHYSICAL  09/09/2022   • INFLUENZA VACCINE  10/01/2022   • TDAP/TD VACCINES (2 - Td or Tdap) 12/15/2024   • PAP SMEAR  03/21/2025   • COLORECTAL CANCER SCREENING  07/01/2029   • HEPATITIS C SCREENING  Completed   • Pneumococcal Vaccine 0-64  Aged Out     Review of Systems   Constitutional: Negative for activity change, appetite change, chills, diaphoresis, fatigue, fever and unexpected weight change.   HENT: Negative for ear pain, hearing loss, mouth sores, sore throat, trouble swallowing and voice change.    Eyes: Negative.    Respiratory: Negative for cough, choking, shortness of breath and wheezing.    Cardiovascular: Negative for chest pain and palpitations.   Gastrointestinal: Positive for abdominal pain and anal bleeding. Negative for constipation, diarrhea, nausea and vomiting.   Endocrine: Negative for cold intolerance and heat intolerance.   Genitourinary: Negative for decreased urine volume, dysuria, frequency, hematuria and urgency.   Musculoskeletal:  "Negative for back pain, gait problem and myalgias.   Skin: Negative for color change, pallor and rash.   Allergic/Immunologic: Negative for food allergies and immunocompromised state.   Neurological: Negative for dizziness, tremors, seizures, syncope, weakness, light-headedness, numbness and headaches.   Hematological: Negative for adenopathy. Does not bruise/bleed easily.   Psychiatric/Behavioral: Negative for agitation and confusion. The patient is not nervous/anxious.    All other systems reviewed and are negative.    Objective   Vitals:    07/08/22 1349   BP: 108/78   Pulse: 68   Temp: 98.6 °F (37 °C)   SpO2: 98%   Weight: 66.2 kg (146 lb)   Height: 152.4 cm (60\")     Body mass index is 28.51 kg/m².  Physical Exam  Constitutional:       Appearance: She is well-developed.   HENT:      Head: Normocephalic and atraumatic.   Eyes:      Pupils: Pupils are equal, round, and reactive to light.   Neck:      Trachea: No tracheal deviation.   Cardiovascular:      Rate and Rhythm: Normal rate and regular rhythm.      Heart sounds: Normal heart sounds. No murmur heard.    No friction rub. No gallop.   Pulmonary:      Effort: Pulmonary effort is normal. No respiratory distress.      Breath sounds: Normal breath sounds. No wheezing or rales.   Chest:      Chest wall: No tenderness.   Abdominal:      General: Bowel sounds are normal. There is no distension.      Palpations: Abdomen is soft. Abdomen is not rigid.      Tenderness: There is no abdominal tenderness. There is no guarding or rebound.   Musculoskeletal:         General: No tenderness or deformity. Normal range of motion.      Cervical back: Normal range of motion and neck supple.   Skin:     General: Skin is warm and dry.      Coloration: Skin is not pale.      Findings: No rash.   Neurological:      Mental Status: She is alert and oriented to person, place, and time.      Deep Tendon Reflexes: Reflexes are normal and symmetric.   Psychiatric:         Behavior: " Behavior normal.         Thought Content: Thought content normal.         Judgment: Judgment normal.       Assessment & Plan   Diagnoses and all orders for this visit:    1. Diarrhea in adult patient (Primary)    2. BRBPR (bright red blood per rectum)  -     Sod Picosulfate-Mag Ox-Cit Acd (Clenpiq) 10-3.5-12 MG-GM -GM/160ML solution; Take 160 mL by mouth Take As Directed.  Dispense: 320 mL; Refill: 0  -     Case Request; Standing  -     Follow Anesthesia Guidelines / Standing Orders; Future  -     Obtain Informed Consent; Future    3. Obesity, unspecified obesity severity, unspecified obesity type    4. Nonsmoker    cramping, diarrhea followed by blood suspicious for ischemic colitis. She has had this in the past as well. Will get colonoscopy to evaluate. Long discussion today regarding symptoms and plan    COLONOSCOPY WITH ANESTHESIA (N/A)  Part of this note may be an electronic transcription/translation of spoken language to printed text using the Dragon Dictation System.  Body mass index is 28.51 kg/m².  No follow-ups on file.    BMI is >= 25 and <30. (Overweight) The following options were offered after discussion;: weight loss educational material (shared in after visit summary)      All risks, benefits, alternatives, and indications of colonoscopy and/or Endoscopy procedure have been discussed with the patient. Risks to include perforation of the colon requiring possible surgery or colostomy, risk of bleeding from biopsies or removal of colon tissue, possibility of missing a colon polyp or cancer, or adverse drug reaction.  Benefits to include the diagnosis and management of disease of the colon and rectum. Alternatives to include barium enema, radiographic evaluation, lab testing or no intervention. Pt verbalizes understanding and agrees.     Deepti Cox, APRN  7/13/2022  13:23 CDT          If you smoke or use tobacco, 4 minutes reading provided  Steps to Quit Smoking  Smoking tobacco can be harmful  to your health and can affect almost every organ in your body. Smoking puts you, and those around you, at risk for developing many serious chronic diseases. Quitting smoking is difficult, but it is one of the best things that you can do for your health. It is never too late to quit.  What are the benefits of quitting smoking?  When you quit smoking, you lower your risk of developing serious diseases and conditions, such as:  · Lung cancer or lung disease, such as COPD.  · Heart disease.  · Stroke.  · Heart attack.  · Infertility.  · Osteoporosis and bone fractures.  Additionally, symptoms such as coughing, wheezing, and shortness of breath may get better when you quit. You may also find that you get sick less often because your body is stronger at fighting off colds and infections. If you are pregnant, quitting smoking can help to reduce your chances of having a baby of low birth weight.  How do I get ready to quit?  When you decide to quit smoking, create a plan to make sure that you are successful. Before you quit:  · Pick a date to quit. Set a date within the next two weeks to give you time to prepare.  · Write down the reasons why you are quitting. Keep this list in places where you will see it often, such as on your bathroom mirror or in your car or wallet.  · Identify the people, places, things, and activities that make you want to smoke (triggers) and avoid them. Make sure to take these actions:  ¨ Throw away all cigarettes at home, at work, and in your car.  ¨ Throw away smoking accessories, such as ashtrays and lighters.  ¨ Clean your car and make sure to empty the ashtray.  ¨ Clean your home, including curtains and carpets.  · Tell your family, friends, and coworkers that you are quitting. Support from your loved ones can make quitting easier.  · Talk with your health care provider about your options for quitting smoking.  · Find out what treatment options are covered by your health insurance.  What  strategies can I use to quit smoking?  Talk with your healthcare provider about different strategies to quit smoking. Some strategies include:  · Quitting smoking altogether instead of gradually lessening how much you smoke over a period of time. Research shows that quitting “cold turkey” is more successful than gradually quitting.  · Attending in-person counseling to help you build problem-solving skills. You are more likely to have success in quitting if you attend several counseling sessions. Even short sessions of 10 minutes can be effective.  · Finding resources and support systems that can help you to quit smoking and remain smoke-free after you quit. These resources are most helpful when you use them often. They can include:  ¨ Online chats with a counselor.  ¨ Telephone quitlines.  ¨ Printed self-help materials.  ¨ Support groups or group counseling.  ¨ Text messaging programs.  ¨ Mobile phone applications.  · Taking medicines to help you quit smoking. (If you are pregnant or breastfeeding, talk with your health care provider first.) Some medicines contain nicotine and some do not. Both types of medicines help with cravings, but the medicines that include nicotine help to relieve withdrawal symptoms. Your health care provider may recommend:  ¨ Nicotine patches, gum, or lozenges.  ¨ Nicotine inhalers or sprays.  ¨ Non-nicotine medicine that is taken by mouth.  Talk with your health care provider about combining strategies, such as taking medicines while you are also receiving in-person counseling. Using these two strategies together makes you more likely to succeed in quitting than if you used either strategy on its own.  If you are pregnant or breastfeeding, talk with your health care provider about finding counseling or other support strategies to quit smoking. Do not take medicine to help you quit smoking unless told to do so by your health care provider.  What things can I do to make it easier to  quit?  Quitting smoking might feel overwhelming at first, but there is a lot that you can do to make it easier. Take these important actions:  · Reach out to your family and friends and ask that they support and encourage you during this time. Call telephone quitlines, reach out to support groups, or work with a counselor for support.  · Ask people who smoke to avoid smoking around you.  · Avoid places that trigger you to smoke, such as bars, parties, or smoke-break areas at work.  · Spend time around people who do not smoke.  · Lessen stress in your life, because stress can be a smoking trigger for some people. To lessen stress, try:  ¨ Exercising regularly.  ¨ Deep-breathing exercises.  ¨ Yoga.  ¨ Meditating.  ¨ Performing a body scan. This involves closing your eyes, scanning your body from head to toe, and noticing which parts of your body are particularly tense. Purposefully relax the muscles in those areas.  · Download or purchase mobile phone or tablet apps (applications) that can help you stick to your quit plan by providing reminders, tips, and encouragement. There are many free apps, such as QuitGuide from the CDC (Centers for Disease Control and Prevention). You can find other support for quitting smoking (smoking cessation) through smokefree.gov and other websites.  How will I feel when I quit smoking?  Within the first 24 hours of quitting smoking, you may start to feel some withdrawal symptoms. These symptoms are usually most noticeable 2-3 days after quitting, but they usually do not last beyond 2-3 weeks. Changes or symptoms that you might experience include:  · Mood swings.  · Restlessness, anxiety, or irritation.  · Difficulty concentrating.  · Dizziness.  · Strong cravings for sugary foods in addition to nicotine.  · Mild weight gain.  · Constipation.  · Nausea.  · Coughing or a sore throat.  · Changes in how your medicines work in your body.  · A depressed mood.  · Difficulty sleeping  (insomnia).  After the first 2-3 weeks of quitting, you may start to notice more positive results, such as:  · Improved sense of smell and taste.  · Decreased coughing and sore throat.  · Slower heart rate.  · Lower blood pressure.  · Clearer skin.  · The ability to breathe more easily.  · Fewer sick days.  Quitting smoking is very challenging for most people. Do not get discouraged if you are not successful the first time. Some people need to make many attempts to quit before they achieve long-term success. Do your best to stick to your quit plan, and talk with your health care provider if you have any questions or concerns.  This information is not intended to replace advice given to you by your health care provider. Make sure you discuss any questions you have with your health care provider.  Document Released: 12/12/2002 Document Revised: 08/15/2017 Document Reviewed: 05/03/2016  [employer]vier Interactive Patient Education © 2017 [employer]

## 2022-07-08 NOTE — H&P (VIEW-ONLY)
Viviana Arzate  1970 7/13/2022  Chief Complaint   Patient presents with   • GI Problem     Feels like she had colitis rlq, she is slightly tender to touch     Subjective   HPI  Viviana Arzate is a 51 y.o. female who presents with a complaint of acute onset of abdominal pain and diarrhea. She contacted our office and I ordered stool cultures and labs. I also put her on Flagyl since the holiday weekend was beginning. She hurts in the right lower abdomen and a severe cramps. It was constant for 2 days. She was having diarrhea every 30-40 minutes and then followed by blood. Symptoms are moderate to severe for her last a few days. The diarrhea is better at this time. Results reviewed. None at this time at follow up however this seems to be a recurrent symptoms that she has had in the past. She has been under a lot of stress. Her last colonoscopy was in 2019.      Past Medical History:   Diagnosis Date   • Allergic rhinitis    • Anxiety    • IBS (irritable bowel syndrome)    • MVP (mitral valve prolapse)    • Vitamin D deficiency      Past Surgical History:   Procedure Laterality Date   • COLONOSCOPY     • COLONOSCOPY N/A 7/1/2019    Negative for colitis       Outpatient Medications Marked as Taking for the 7/8/22 encounter (Office Visit) with Deepti Cox APRN   Medication Sig Dispense Refill   • acebutolol (SECTRAL) 200 MG capsule Take 200 mg by mouth 2 (Two) Times a Day.     • ALPRAZolam (XANAX) 0.25 MG tablet TAKE ONE TABLET BY MOUTH DAILY AS NEEDED FOR ANXIETY 15 tablet 1   • amphetamine-dextroamphetamine XR (Adderall XR) 20 MG 24 hr capsule Take 1 capsule by mouth Every Morning For improved focus and attention 30 capsule 0   • cyanocobalamin 1000 MCG/ML injection      • ipratropium (ATROVENT) 0.06 % nasal spray Put 2 sprays in each nostril three times a day to control allergy symptoms and congestion. 45 mL 3   • levonorgestrel (MIRENA) 20 MCG/24HR IUD 1 each by Intrauterine route 1 (One) Time.     •  Probiotic Product (PROBIOTIC ADVANCED PO) Take  by mouth Daily.     • valACYclovir (VALTREX) 500 MG tablet Take 1 tablet by mouth Daily. 90 tablet 3   • venlafaxine XR (EFFEXOR-XR) 150 MG 24 hr capsule Take 1 capsule by mouth Daily. For mood 90 capsule 3   • vitamin D (ERGOCALCIFEROL) 1.25 MG (56188 UT) capsule capsule Take 1 capsule by mouth 1 (One) Time Per Week. 12 capsule 3     Allergies   Allergen Reactions   • Sulfa Antibiotics Other (See Comments)     Social History     Socioeconomic History   • Marital status:    Tobacco Use   • Smoking status: Never Smoker   • Smokeless tobacco: Never Used   Substance and Sexual Activity   • Alcohol use: Yes     Comment: 3- 4 glasses of wine a week   • Drug use: No   • Sexual activity: Defer     Family History   Problem Relation Age of Onset   • Colon cancer Neg Hx    • Colon polyps Neg Hx      Health Maintenance   Topic Date Due   • COVID-19 Vaccine (3 - Booster for Tom series) 04/27/2022   • ANNUAL PHYSICAL  09/09/2022   • INFLUENZA VACCINE  10/01/2022   • TDAP/TD VACCINES (2 - Td or Tdap) 12/15/2024   • PAP SMEAR  03/21/2025   • COLORECTAL CANCER SCREENING  07/01/2029   • HEPATITIS C SCREENING  Completed   • Pneumococcal Vaccine 0-64  Aged Out     Review of Systems   Constitutional: Negative for activity change, appetite change, chills, diaphoresis, fatigue, fever and unexpected weight change.   HENT: Negative for ear pain, hearing loss, mouth sores, sore throat, trouble swallowing and voice change.    Eyes: Negative.    Respiratory: Negative for cough, choking, shortness of breath and wheezing.    Cardiovascular: Negative for chest pain and palpitations.   Gastrointestinal: Positive for abdominal pain and anal bleeding. Negative for constipation, diarrhea, nausea and vomiting.   Endocrine: Negative for cold intolerance and heat intolerance.   Genitourinary: Negative for decreased urine volume, dysuria, frequency, hematuria and urgency.   Musculoskeletal:  "Negative for back pain, gait problem and myalgias.   Skin: Negative for color change, pallor and rash.   Allergic/Immunologic: Negative for food allergies and immunocompromised state.   Neurological: Negative for dizziness, tremors, seizures, syncope, weakness, light-headedness, numbness and headaches.   Hematological: Negative for adenopathy. Does not bruise/bleed easily.   Psychiatric/Behavioral: Negative for agitation and confusion. The patient is not nervous/anxious.    All other systems reviewed and are negative.    Objective   Vitals:    07/08/22 1349   BP: 108/78   Pulse: 68   Temp: 98.6 °F (37 °C)   SpO2: 98%   Weight: 66.2 kg (146 lb)   Height: 152.4 cm (60\")     Body mass index is 28.51 kg/m².  Physical Exam  Constitutional:       Appearance: She is well-developed.   HENT:      Head: Normocephalic and atraumatic.   Eyes:      Pupils: Pupils are equal, round, and reactive to light.   Neck:      Trachea: No tracheal deviation.   Cardiovascular:      Rate and Rhythm: Normal rate and regular rhythm.      Heart sounds: Normal heart sounds. No murmur heard.    No friction rub. No gallop.   Pulmonary:      Effort: Pulmonary effort is normal. No respiratory distress.      Breath sounds: Normal breath sounds. No wheezing or rales.   Chest:      Chest wall: No tenderness.   Abdominal:      General: Bowel sounds are normal. There is no distension.      Palpations: Abdomen is soft. Abdomen is not rigid.      Tenderness: There is no abdominal tenderness. There is no guarding or rebound.   Musculoskeletal:         General: No tenderness or deformity. Normal range of motion.      Cervical back: Normal range of motion and neck supple.   Skin:     General: Skin is warm and dry.      Coloration: Skin is not pale.      Findings: No rash.   Neurological:      Mental Status: She is alert and oriented to person, place, and time.      Deep Tendon Reflexes: Reflexes are normal and symmetric.   Psychiatric:         Behavior: " Behavior normal.         Thought Content: Thought content normal.         Judgment: Judgment normal.       Assessment & Plan   Diagnoses and all orders for this visit:    1. Diarrhea in adult patient (Primary)    2. BRBPR (bright red blood per rectum)  -     Sod Picosulfate-Mag Ox-Cit Acd (Clenpiq) 10-3.5-12 MG-GM -GM/160ML solution; Take 160 mL by mouth Take As Directed.  Dispense: 320 mL; Refill: 0  -     Case Request; Standing  -     Follow Anesthesia Guidelines / Standing Orders; Future  -     Obtain Informed Consent; Future    3. Obesity, unspecified obesity severity, unspecified obesity type    4. Nonsmoker    cramping, diarrhea followed by blood suspicious for ischemic colitis. She has had this in the past as well. Will get colonoscopy to evaluate. Long discussion today regarding symptoms and plan    COLONOSCOPY WITH ANESTHESIA (N/A)  Part of this note may be an electronic transcription/translation of spoken language to printed text using the Dragon Dictation System.  Body mass index is 28.51 kg/m².  No follow-ups on file.    BMI is >= 25 and <30. (Overweight) The following options were offered after discussion;: weight loss educational material (shared in after visit summary)      All risks, benefits, alternatives, and indications of colonoscopy and/or Endoscopy procedure have been discussed with the patient. Risks to include perforation of the colon requiring possible surgery or colostomy, risk of bleeding from biopsies or removal of colon tissue, possibility of missing a colon polyp or cancer, or adverse drug reaction.  Benefits to include the diagnosis and management of disease of the colon and rectum. Alternatives to include barium enema, radiographic evaluation, lab testing or no intervention. Pt verbalizes understanding and agrees.     Deepti oCx, APRN  7/13/2022  13:23 CDT          If you smoke or use tobacco, 4 minutes reading provided  Steps to Quit Smoking  Smoking tobacco can be harmful  to your health and can affect almost every organ in your body. Smoking puts you, and those around you, at risk for developing many serious chronic diseases. Quitting smoking is difficult, but it is one of the best things that you can do for your health. It is never too late to quit.  What are the benefits of quitting smoking?  When you quit smoking, you lower your risk of developing serious diseases and conditions, such as:  · Lung cancer or lung disease, such as COPD.  · Heart disease.  · Stroke.  · Heart attack.  · Infertility.  · Osteoporosis and bone fractures.  Additionally, symptoms such as coughing, wheezing, and shortness of breath may get better when you quit. You may also find that you get sick less often because your body is stronger at fighting off colds and infections. If you are pregnant, quitting smoking can help to reduce your chances of having a baby of low birth weight.  How do I get ready to quit?  When you decide to quit smoking, create a plan to make sure that you are successful. Before you quit:  · Pick a date to quit. Set a date within the next two weeks to give you time to prepare.  · Write down the reasons why you are quitting. Keep this list in places where you will see it often, such as on your bathroom mirror or in your car or wallet.  · Identify the people, places, things, and activities that make you want to smoke (triggers) and avoid them. Make sure to take these actions:  ¨ Throw away all cigarettes at home, at work, and in your car.  ¨ Throw away smoking accessories, such as ashtrays and lighters.  ¨ Clean your car and make sure to empty the ashtray.  ¨ Clean your home, including curtains and carpets.  · Tell your family, friends, and coworkers that you are quitting. Support from your loved ones can make quitting easier.  · Talk with your health care provider about your options for quitting smoking.  · Find out what treatment options are covered by your health insurance.  What  strategies can I use to quit smoking?  Talk with your healthcare provider about different strategies to quit smoking. Some strategies include:  · Quitting smoking altogether instead of gradually lessening how much you smoke over a period of time. Research shows that quitting “cold turkey” is more successful than gradually quitting.  · Attending in-person counseling to help you build problem-solving skills. You are more likely to have success in quitting if you attend several counseling sessions. Even short sessions of 10 minutes can be effective.  · Finding resources and support systems that can help you to quit smoking and remain smoke-free after you quit. These resources are most helpful when you use them often. They can include:  ¨ Online chats with a counselor.  ¨ Telephone quitlines.  ¨ Printed self-help materials.  ¨ Support groups or group counseling.  ¨ Text messaging programs.  ¨ Mobile phone applications.  · Taking medicines to help you quit smoking. (If you are pregnant or breastfeeding, talk with your health care provider first.) Some medicines contain nicotine and some do not. Both types of medicines help with cravings, but the medicines that include nicotine help to relieve withdrawal symptoms. Your health care provider may recommend:  ¨ Nicotine patches, gum, or lozenges.  ¨ Nicotine inhalers or sprays.  ¨ Non-nicotine medicine that is taken by mouth.  Talk with your health care provider about combining strategies, such as taking medicines while you are also receiving in-person counseling. Using these two strategies together makes you more likely to succeed in quitting than if you used either strategy on its own.  If you are pregnant or breastfeeding, talk with your health care provider about finding counseling or other support strategies to quit smoking. Do not take medicine to help you quit smoking unless told to do so by your health care provider.  What things can I do to make it easier to  quit?  Quitting smoking might feel overwhelming at first, but there is a lot that you can do to make it easier. Take these important actions:  · Reach out to your family and friends and ask that they support and encourage you during this time. Call telephone quitlines, reach out to support groups, or work with a counselor for support.  · Ask people who smoke to avoid smoking around you.  · Avoid places that trigger you to smoke, such as bars, parties, or smoke-break areas at work.  · Spend time around people who do not smoke.  · Lessen stress in your life, because stress can be a smoking trigger for some people. To lessen stress, try:  ¨ Exercising regularly.  ¨ Deep-breathing exercises.  ¨ Yoga.  ¨ Meditating.  ¨ Performing a body scan. This involves closing your eyes, scanning your body from head to toe, and noticing which parts of your body are particularly tense. Purposefully relax the muscles in those areas.  · Download or purchase mobile phone or tablet apps (applications) that can help you stick to your quit plan by providing reminders, tips, and encouragement. There are many free apps, such as QuitGuide from the CDC (Centers for Disease Control and Prevention). You can find other support for quitting smoking (smoking cessation) through smokefree.gov and other websites.  How will I feel when I quit smoking?  Within the first 24 hours of quitting smoking, you may start to feel some withdrawal symptoms. These symptoms are usually most noticeable 2-3 days after quitting, but they usually do not last beyond 2-3 weeks. Changes or symptoms that you might experience include:  · Mood swings.  · Restlessness, anxiety, or irritation.  · Difficulty concentrating.  · Dizziness.  · Strong cravings for sugary foods in addition to nicotine.  · Mild weight gain.  · Constipation.  · Nausea.  · Coughing or a sore throat.  · Changes in how your medicines work in your body.  · A depressed mood.  · Difficulty sleeping  (insomnia).  After the first 2-3 weeks of quitting, you may start to notice more positive results, such as:  · Improved sense of smell and taste.  · Decreased coughing and sore throat.  · Slower heart rate.  · Lower blood pressure.  · Clearer skin.  · The ability to breathe more easily.  · Fewer sick days.  Quitting smoking is very challenging for most people. Do not get discouraged if you are not successful the first time. Some people need to make many attempts to quit before they achieve long-term success. Do your best to stick to your quit plan, and talk with your health care provider if you have any questions or concerns.  This information is not intended to replace advice given to you by your health care provider. Make sure you discuss any questions you have with your health care provider.  Document Released: 12/12/2002 Document Revised: 08/15/2017 Document Reviewed: 05/03/2016  Elsevier Interactive Patient Education © 2017 [employer]

## 2022-07-13 RX ORDER — SODIUM PICOSULFATE, MAGNESIUM OXIDE, AND ANHYDROUS CITRIC ACID 10; 3.5; 12 MG/160ML; G/160ML; G/160ML
160 LIQUID ORAL TAKE AS DIRECTED
Qty: 320 ML | Refills: 0 | Status: ON HOLD | OUTPATIENT
Start: 2022-07-13 | End: 2022-07-15

## 2022-07-15 ENCOUNTER — ANESTHESIA (OUTPATIENT)
Dept: GASTROENTEROLOGY | Facility: HOSPITAL | Age: 52
End: 2022-07-15

## 2022-07-15 ENCOUNTER — HOSPITAL ENCOUNTER (OUTPATIENT)
Facility: HOSPITAL | Age: 52
Setting detail: HOSPITAL OUTPATIENT SURGERY
Discharge: HOME | End: 2022-07-15
Attending: INTERNAL MEDICINE | Admitting: INTERNAL MEDICINE

## 2022-07-15 ENCOUNTER — ANESTHESIA EVENT (OUTPATIENT)
Dept: GASTROENTEROLOGY | Facility: HOSPITAL | Age: 52
End: 2022-07-15

## 2022-07-15 VITALS
OXYGEN SATURATION: 98 % | HEIGHT: 60 IN | HEART RATE: 56 BPM | RESPIRATION RATE: 20 BRPM | TEMPERATURE: 98 F | SYSTOLIC BLOOD PRESSURE: 99 MMHG | BODY MASS INDEX: 28.07 KG/M2 | WEIGHT: 143 LBS | DIASTOLIC BLOOD PRESSURE: 70 MMHG

## 2022-07-15 DIAGNOSIS — R10.31 RIGHT LOWER QUADRANT ABDOMINAL PAIN: Primary | ICD-10-CM

## 2022-07-15 DIAGNOSIS — K62.5 BRBPR (BRIGHT RED BLOOD PER RECTUM): ICD-10-CM

## 2022-07-15 LAB — B-HCG UR QL: NEGATIVE

## 2022-07-15 PROCEDURE — 81025 URINE PREGNANCY TEST: CPT | Performed by: ANESTHESIOLOGY

## 2022-07-15 PROCEDURE — 45380 COLONOSCOPY AND BIOPSY: CPT | Performed by: INTERNAL MEDICINE

## 2022-07-15 PROCEDURE — 25010000002 PROPOFOL 10 MG/ML EMULSION: Performed by: NURSE ANESTHETIST, CERTIFIED REGISTERED

## 2022-07-15 PROCEDURE — 88305 TISSUE EXAM BY PATHOLOGIST: CPT | Performed by: INTERNAL MEDICINE

## 2022-07-15 RX ORDER — LIDOCAINE HYDROCHLORIDE 10 MG/ML
0.5 INJECTION, SOLUTION EPIDURAL; INFILTRATION; INTRACAUDAL; PERINEURAL ONCE AS NEEDED
Status: CANCELLED | OUTPATIENT
Start: 2022-07-15

## 2022-07-15 RX ORDER — PROPOFOL 10 MG/ML
VIAL (ML) INTRAVENOUS AS NEEDED
Status: DISCONTINUED | OUTPATIENT
Start: 2022-07-15 | End: 2022-07-15 | Stop reason: SURG

## 2022-07-15 RX ORDER — SODIUM CHLORIDE 0.9 % (FLUSH) 0.9 %
10 SYRINGE (ML) INJECTION AS NEEDED
Status: DISCONTINUED | OUTPATIENT
Start: 2022-07-15 | End: 2022-07-15 | Stop reason: HOSPADM

## 2022-07-15 RX ORDER — SODIUM CHLORIDE 9 MG/ML
500 INJECTION, SOLUTION INTRAVENOUS CONTINUOUS PRN
Status: DISCONTINUED | OUTPATIENT
Start: 2022-07-15 | End: 2022-07-15 | Stop reason: HOSPADM

## 2022-07-15 RX ADMIN — PROPOFOL 100 MG: 10 INJECTION, EMULSION INTRAVENOUS at 08:55

## 2022-07-15 RX ADMIN — PROPOFOL 100 MG: 10 INJECTION, EMULSION INTRAVENOUS at 09:01

## 2022-07-15 RX ADMIN — SODIUM CHLORIDE 500 ML: 9 INJECTION, SOLUTION INTRAVENOUS at 08:06

## 2022-07-15 RX ADMIN — PROPOFOL 100 MG: 10 INJECTION, EMULSION INTRAVENOUS at 09:07

## 2022-07-15 NOTE — ANESTHESIA PREPROCEDURE EVALUATION
Anesthesia Evaluation     Patient summary reviewed   no history of anesthetic complications:  NPO Solid Status: > 8 hours             Airway   Mallampati: II  Dental    (+) upper dentures    Pulmonary - negative pulmonary ROS   Cardiovascular   Exercise tolerance: excellent (>7 METS)    (+) valvular problems/murmurs MVP,       Neuro/Psych- negative ROS  GI/Hepatic/Renal/Endo - negative ROS     Musculoskeletal     Abdominal    Substance History      OB/GYN          Other                        Anesthesia Plan    ASA 2     MAC       Anesthetic plan, risks, benefits, and alternatives have been provided, discussed and informed consent has been obtained with: patient.        CODE STATUS:

## 2022-07-15 NOTE — ANESTHESIA POSTPROCEDURE EVALUATION
Patient: Viviana FLETCHER    Procedure Summary     Date: 07/15/22 Room / Location: [facility] ENDOSCOPY 4 / [facility]    Anesthesia Start: 0854 Anesthesia Stop: 0916    Procedure: COLONOSCOPY WITH ANESTHESIA (N/A ) Diagnosis:       BRBPR (bright red blood per rectum)      (BRBPR (bright red blood per rectum) [K62.5])    Surgeons: Danyell Peña MD Provider: John Almanza CRNA    Anesthesia Type: MAC ASA Status: 2          Anesthesia Type: MAC    Vitals  Vitals Value Taken Time   BP 81/59 07/15/22 0921   Temp     Pulse 64 07/15/22 0924   Resp 14 07/15/22 0920   SpO2 96 % 07/15/22 0924   Vitals shown include unvalidated device data.        Post Anesthesia Care and Evaluation    Patient location during evaluation: PHASE II  Patient participation: complete - patient participated  Level of consciousness: awake and alert  Pain score: 0  Pain management: adequate    Airway patency: patent  Anesthetic complications: No anesthetic complications  PONV Status: none  Cardiovascular status: acceptable and stable  Respiratory status: acceptable and unassisted  Hydration status: acceptable

## 2022-07-18 LAB
CYTO UR: NORMAL
LAB AP CASE REPORT: NORMAL
Lab: NORMAL
PATH REPORT.FINAL DX SPEC: NORMAL
PATH REPORT.GROSS SPEC: NORMAL

## 2022-07-18 NOTE — PROGRESS NOTES
[facility] - PODIATRY    Today's Date: 07/19/2022     Patient Name: iVviana FLETCHER  MRN: [id number]  CSN: [id number]  PCP: Donna Hood MD  Referring Provider: Donna Hood MD    SUBJECTIVE     Chief Complaint   Patient presents with   • Establish Care     Donna Hood MD 05/16/2022 NEW PT. - PT STATES left ankle pain, sharp pain that will make her stop and will nabila make limp, and walks carefully, has been hurting it for a couple months now  - pt pain 7/10 - pt presents New patient, left ankle pain, unsure why, and no other visible siting      HPI: Viviana FLETCHER, a 51 y.o.female, comes to clinic as a(n) new patient complaining of ankle pain. Patient has h/o anxiety, IBS, MVP, vitamin D deficiency. Patient reports pain in the left ankle with redness and swelling that first started in September of last year. Notes that she initially started having pain after decorating for a wedding and being on her feet consistently for around 10 days. Notes that pain did improve after a period of time on its own but then symptoms returned again in May of 2022. Denies any known injury or trauma prior to onset of symptoms at either time. 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 Admits pain at 7/10 level and described as sharp. Denies previous treatment. Denies any constitutional symptoms. No other pedal complaints at this time.    Past Medical History:   Diagnosis Date   • Allergic rhinitis    • Anxiety    • IBS (irritable bowel syndrome)    • MVP (mitral valve prolapse)    • Vitamin D deficiency      Past Surgical History:   Procedure Laterality Date   • COLONOSCOPY     • COLONOSCOPY N/A 7/1/2019    Negative for colitis   • COLONOSCOPY N/A 7/15/2022    Procedure: COLONOSCOPY WITH ANESTHESIA;  Surgeon: Danyell Peña MD;  Location: [facility] ENDOSCOPY;  Service: Gastroenterology;  Laterality: N/A;  Pre: brbpr, diarrhea  Post:  "normal  Donna Hood MD     Family History   Problem Relation Age of Onset   • Colon cancer Neg Hx    • Colon polyps Neg Hx      Social History     Socioeconomic History   • Marital status:    Tobacco Use   • Smoking status: Never Smoker   • Smokeless tobacco: Never Used   Substance and Sexual Activity   • Alcohol use: Yes     Comment: 3- 4 glasses of wine a week   • Drug use: No   • Sexual activity: Defer     Allergies   Allergen Reactions   • Sulfa Antibiotics Other (See Comments)     Current Outpatient Medications   Medication Sig Dispense Refill   • acebutolol (SECTRAL) 200 MG capsule Take 200 mg by mouth 2 (Two) Times a Day.     • ALPRAZolam (XANAX) 0.25 MG tablet TAKE ONE TABLET BY MOUTH DAILY AS NEEDED FOR ANXIETY 15 tablet 1   • amphetamine-dextroamphetamine XR (Adderall XR) 20 MG 24 hr capsule Take 1 capsule by mouth Every Morning For improved focus and attention 30 capsule 0   • cyanocobalamin 1000 MCG/ML injection      • ipratropium (ATROVENT) 0.06 % nasal spray Put 2 sprays in each nostril three times a day to control allergy symptoms and congestion. 45 mL 3   • levonorgestrel (MIRENA) 20 MCG/24HR IUD 1 each by Intrauterine route 1 (One) Time.     • Probiotic Product (PROBIOTIC ADVANCED PO) Take  by mouth Daily.     • Syringe/Needle, Disp, 25G X 1\" 3 ML misc Use with each B12 shot. 5 each 1   • valACYclovir (VALTREX) 500 MG tablet Take 1 tablet by mouth Daily. 90 tablet 3   • venlafaxine XR (EFFEXOR-XR) 150 MG 24 hr capsule Take 1 capsule by mouth Daily. For mood 90 capsule 3   • vitamin D (ERGOCALCIFEROL) 1.25 MG (60403 UT) capsule capsule Take 1 capsule by mouth 1 (One) Time Per Week. 12 capsule 3   • predniSONE 5 MG (21) tablet therapy pack dose pack Take 1 tablet by mouth Take As Directed. Take as directed on package instructions. 1 each 0     No current facility-administered medications for this visit.     Review of Systems   Constitutional: Negative for chills and fever.   HENT: " Negative for congestion.    Respiratory: Negative for shortness of breath.    Cardiovascular: Positive for leg swelling. Negative for chest pain.   Gastrointestinal: Negative for constipation, diarrhea, nausea and vomiting.   Musculoskeletal: Positive for arthralgias.        Foot pain   Skin: Negative for wound.   Neurological: Negative for numbness.       OBJECTIVE     Vitals:    07/19/22 1436   BP: 110/70       PHYSICAL EXAM  GEN:   Accompanied by none.     Foot/Ankle Exam:       General:   Appearance: appears stated age and healthy    Orientation: AAOx3    Affect: appropriate    Gait: unimpaired    Assistance: independent    Shoe Gear:  Casual shoes (wedges)    VASCULAR      Right Foot Vascularity   Dorsalis pedis:  2+  Posterior tibial:  2+  Skin Temperature: warm    Edema Grading:  None  CFT:  3  Pedal Hair Growth:  Present  Varicosities: none       Left Foot Vascularity   Dorsalis pedis:  2+  Posterior tibial:  2+  Skin Temperature: warm    Edema Grading:  None  CFT:  3  Pedal Hair Growth:  Present  Varicosities: none        NEUROLOGIC     Right Foot Neurologic   Normal sensation    Light touch sensation:  Normal  Vibratory sensation:  Normal  Hot/Cold sensation: normal    Protective Sensation using [place]-Lindsey Monofilament:  10     Left Foot Neurologic   Normal sensation    Light touch sensation:  Normal  Vibratory sensation:  Normal  Hot/cold sensation: normal    Protective Sensation using [place]-Lind[place] Monofilament:  10     MUSCULOSKELETAL      Right Foot Musculoskeletal   Ecchymosis:  None  Tenderness: none    Arch:  Pes planus (Mild pronation on weightbearing.)  Hallux valgus: Yes (mild, no crepitus)    Hallux limitus: No    Tailor's bunion: Yes       Left Foot Musculoskeletal   Ecchymosis:  None  Tenderness: lateral malleolus and subtalar joint    Arch:  Pes planus (Mild pronation on weightbearing.)  Hallux valgus: Yes (mild, no crepitus)    Hallux limitus: No    Tailor's bunion: Yes       MUSCLE  STRENGTH     Right Foot Muscle Strength   Foot dorsiflexion:  5  Foot plantar flexion:  5  Foot inversion:  5  Foot eversion:  5     Left Foot Muscle Strength   Foot dorsiflexion:  5  Foot plantar flexion:  5  Foot inversion:  5  Foot eversion:  5     RANGE OF MOTION      Right Foot Range of Motion   Foot and ankle ROM within normal limits       Left Foot Range of Motion   Foot and ankle ROM within normal limits       DERMATOLOGIC     Right Foot Dermatologic   Skin: skin intact    Nails: normal       Left Foot Dermatologic   Skin: skin intact    Nails: normal       TESTS     Left Foot Tests   Anterior drawer: negative    Varus tilt: negative    Syndesmosis squeeze: negative    Calcaneal squeeze: negative       Image:       RADIOLOGY/NUCLEAR:  XR Ankle 3+ View Left    Result Date: 7/19/2022  Narrative: EXAMINATION: XR ANKLE 3+ VW LEFT- 7/19/2022 4:33 PM CDT  HISTORY: ankle pain; M25.572-Pain in left ankle and joints of left foot; G89.29-Other chronic pain; M25.572-Pain in left ankle and joints of left foot.  REPORT: 3 views of the left ankle were obtained with patient bearing weight.  COMPARISON: There are no correlative imaging studies for comparison.  Osseous alignment is normal, no fractures identified in the joint spaces are preserved. Soft tissues appear within normal limits. Pes planus deformity is present.      Impression: No acute osseous abnormality. Pes planus deformity is present. This report was finalized on 07/19/2022 16:34 by Dr. Stepan Centeno MD.      LABORATORY/CULTURE RESULTS:      PATHOLOGY RESULTS:       ASSESSMENT/PLAN     Diagnoses and all orders for this visit:    1. Chronic pain of left ankle (Primary)  -     XR Ankle 3+ View Left; Future    2. Sinus tarsi syndrome of left ankle  -     XR Ankle 3+ View Left; Future    3. Valgus deformity of both great toes    4. Tailor's bunion of both feet    Other orders  -     predniSONE 5 MG (21) tablet therapy pack dose pack; Take 1 tablet by mouth Take  As Directed. Take as directed on package instructions.  Dispense: 1 each; Refill: 0      Comprehensive lower extremity examination and evaluation was performed.  Discussed findings and treatment plan including risks, benefits, and treatment options with patient in detail. Patient agreed with treatment plan.  Uncertain etiology of patient's pain at this time.  DDx: Sinus tarsitis, lateral ankle instability   We will initially remain conservative with rest, icing, steroid pack.  X-rays to evaluate foot/ankle structure.  An After Visit Summary was printed and given to the patient at discharge, including (if requested) any available informative/educational handouts regarding diagnosis, treatment, or medications. All questions were answered to patient/family satisfaction. Should symptoms fail to improve or worsen they agree to call or return to clinic or to go to the Emergency Department. Discussed the importance of following up with any needed screening tests/labs/specialist appointments and any requested follow-up recommended by me today. Importance of maintaining follow-up discussed and patient accepts that missed appointments can delay diagnosis and potentially lead to worsening of conditions.  Return in about 1 month (around 8/19/2022) for Follow-up with Carmina Scruggs., or sooner if acute issues arise.    Lab Frequency Next Occurrence   Follow Anesthesia Guidelines / Standing Orders Once 06/11/2019   Obtain Informed Consent Once 06/16/2019   Follow Anesthesia Guidelines / Standing Orders Once 07/13/2022   Obtain Informed Consent Once 07/18/2022   Diet: Once 07/15/2022   CT Enterography Abdomen Pelvis w Contrast Once 07/20/2022       This document has been electronically signed by Frederick Fry DPM on July 19, 2022 17:01 CDT

## 2022-07-19 ENCOUNTER — OFFICE VISIT (OUTPATIENT)
Dept: PODIATRY | Facility: CLINIC | Age: 52
End: 2022-07-19

## 2022-07-19 ENCOUNTER — HOSPITAL ENCOUNTER (OUTPATIENT)
Dept: GENERAL RADIOLOGY | Facility: HOSPITAL | Age: 52
Discharge: HOME | End: 2022-07-19
Admitting: PODIATRIST

## 2022-07-19 ENCOUNTER — PATIENT ROUNDING (BHMG ONLY) (OUTPATIENT)
Dept: PODIATRY | Facility: CLINIC | Age: 52
End: 2022-07-19

## 2022-07-19 VITALS
DIASTOLIC BLOOD PRESSURE: 70 MMHG | SYSTOLIC BLOOD PRESSURE: 110 MMHG | HEIGHT: 60 IN | BODY MASS INDEX: 28.07 KG/M2 | WEIGHT: 143 LBS

## 2022-07-19 DIAGNOSIS — G89.29 CHRONIC PAIN OF LEFT ANKLE: ICD-10-CM

## 2022-07-19 DIAGNOSIS — M25.572 SINUS TARSI SYNDROME OF LEFT ANKLE: ICD-10-CM

## 2022-07-19 DIAGNOSIS — M21.621 TAILOR'S BUNION OF BOTH FEET: ICD-10-CM

## 2022-07-19 DIAGNOSIS — M20.11 VALGUS DEFORMITY OF BOTH GREAT TOES: ICD-10-CM

## 2022-07-19 DIAGNOSIS — M21.622 TAILOR'S BUNION OF BOTH FEET: ICD-10-CM

## 2022-07-19 DIAGNOSIS — G89.29 CHRONIC PAIN OF LEFT ANKLE: Primary | ICD-10-CM

## 2022-07-19 DIAGNOSIS — M25.572 CHRONIC PAIN OF LEFT ANKLE: Primary | ICD-10-CM

## 2022-07-19 DIAGNOSIS — M20.12 VALGUS DEFORMITY OF BOTH GREAT TOES: ICD-10-CM

## 2022-07-19 DIAGNOSIS — M25.572 CHRONIC PAIN OF LEFT ANKLE: ICD-10-CM

## 2022-07-19 PROCEDURE — 73610 X-RAY EXAM OF ANKLE: CPT

## 2022-07-19 PROCEDURE — 99203 OFFICE O/P NEW LOW 30 MIN: CPT | Performed by: PODIATRIST

## 2022-07-19 RX ORDER — PREDNISONE 5 MG/1
1 TABLET ORAL TAKE AS DIRECTED
Qty: 1 EACH | Refills: 0 | Status: SHIPPED | OUTPATIENT
Start: 2022-07-19 | End: 2022-09-27

## 2022-07-19 NOTE — PROGRESS NOTES
July 19, 2022    Hello, may I speak with Viviana CHANCE?    My name is Poly      I am  with [facility]  [address], [address]  [place] [id number] [phone].    Before we get started may I verify your date of birth? 1970    I am calling to officially welcome you to our practice and ask about your recent visit. Is this a good time to talk? yes    Tell me about your visit with us. What things went well?  Visit went well and all questions were answered.     We're always looking for ways to make our patients' experiences even better. Do you have recommendations on ways we may improve?  no    Overall were you satisfied with your first visit to our practice? yes       I appreciate you taking the time to speak with me today. Is there anything else I can do for you? no      Thank you, and have a great day.

## 2022-07-20 ENCOUNTER — PATIENT MESSAGE (OUTPATIENT)
Dept: FAMILY MEDICINE CLINIC | Facility: CLINIC | Age: 52
End: 2022-07-20

## 2022-07-20 DIAGNOSIS — E53.8 B12 DEFICIENCY: ICD-10-CM

## 2022-07-22 ENCOUNTER — TELEPHONE (OUTPATIENT)
Dept: PODIATRY | Facility: CLINIC | Age: 52
End: 2022-07-22

## 2022-07-22 ENCOUNTER — HOSPITAL ENCOUNTER (OUTPATIENT)
Dept: CT IMAGING | Facility: HOSPITAL | Age: 52
Discharge: HOME | End: 2022-07-22
Admitting: INTERNAL MEDICINE

## 2022-07-22 DIAGNOSIS — K62.5 BRBPR (BRIGHT RED BLOOD PER RECTUM): ICD-10-CM

## 2022-07-22 DIAGNOSIS — R10.31 RIGHT LOWER QUADRANT ABDOMINAL PAIN: ICD-10-CM

## 2022-07-22 PROCEDURE — 0 IOPAMIDOL PER 1 ML: Performed by: INTERNAL MEDICINE

## 2022-07-22 PROCEDURE — 74177 CT ABD & PELVIS W/CONTRAST: CPT

## 2022-07-22 RX ADMIN — IOPAMIDOL 100 ML: 755 INJECTION, SOLUTION INTRAVENOUS at 14:59

## 2022-07-22 NOTE — TELEPHONE ENCOUNTER
Called pt in regards of xrays stating no acute osseous abnormality. Pes planus deformity is  Present.  Left pt voicemail

## 2022-07-28 ENCOUNTER — TELEPHONE (OUTPATIENT)
Dept: GASTROENTEROLOGY | Facility: CLINIC | Age: 52
End: 2022-07-28

## 2022-07-28 NOTE — TELEPHONE ENCOUNTER
Called and spoke to pt re: results-she VU. She was at a work meeting so she couldn't schedule at the moment-she tells me she will call back to schedule 4 month f/u. I advised her I would give Deepthi a heads up as well. Pt advised to call me back with any further questions/problems.    Deepthi-just an FYI-she should be calling back to make a f/u-if she doesn't can you reach out to her next week to discuss/arrange? Thanks!

## 2022-07-28 NOTE — TELEPHONE ENCOUNTER
Please inform CT enterography is good.  Lets have her follow-up in the office with me in approximately 4 months to regroup.    Danyell Peña MD

## 2022-08-02 NOTE — TELEPHONE ENCOUNTER
From: Viviana FLETCHER  To: Donna Hood MD  Sent: 7/20/2022 7:33 AM CDT  Subject: B12    The B12 refill did not come with syringes this time. Can you assist?  Thanks  LB

## 2022-08-07 DIAGNOSIS — F90.9 ATTENTION DEFICIT HYPERACTIVITY DISORDER (ADHD), UNSPECIFIED ADHD TYPE: ICD-10-CM

## 2022-08-07 DIAGNOSIS — I34.1 MITRAL VALVE PROLAPSE: ICD-10-CM

## 2022-08-07 DIAGNOSIS — R00.2 PALPITATIONS: ICD-10-CM

## 2022-08-08 NOTE — TELEPHONE ENCOUNTER
Rx Refill Note  Requested Prescriptions     Pending Prescriptions Disp Refills   • ALPRAZolam (XANAX) 0.25 MG tablet 15 tablet 1     Sig: Take 1 tablet by mouth Daily As Needed for Anxiety.   • amphetamine-dextroamphetamine XR (Adderall XR) 20 MG 24 hr capsule 30 capsule 0     Sig: Take 1 capsule by mouth Every Morning For improved focus and attention      Last office visit with prescribing clinician: 5/16/2022      Next office visit with prescribing clinician: 9/26/2022            ILIA Longo  08/08/22, 07:58 CDT

## 2022-08-09 RX ORDER — DEXTROAMPHETAMINE SACCHARATE, AMPHETAMINE ASPARTATE MONOHYDRATE, DEXTROAMPHETAMINE SULFATE AND AMPHETAMINE SULFATE 5; 5; 5; 5 MG/1; MG/1; MG/1; MG/1
20 CAPSULE, EXTENDED RELEASE ORAL EVERY MORNING
Qty: 30 CAPSULE | Refills: 0 | Status: SHIPPED | OUTPATIENT
Start: 2022-08-09 | End: 2022-09-27 | Stop reason: SDUPTHER

## 2022-08-09 RX ORDER — ALPRAZOLAM 0.25 MG/1
0.25 TABLET ORAL DAILY PRN
Qty: 15 TABLET | Refills: 1 | Status: SHIPPED | OUTPATIENT
Start: 2022-08-09 | End: 2022-11-29

## 2022-08-12 DIAGNOSIS — J30.89 NON-SEASONAL ALLERGIC RHINITIS DUE TO OTHER ALLERGIC TRIGGER: ICD-10-CM

## 2022-08-12 RX ORDER — IPRATROPIUM BROMIDE 42 UG/1
SPRAY, METERED NASAL
Qty: 45 ML | Refills: 3 | Status: SHIPPED | OUTPATIENT
Start: 2022-08-12

## 2022-08-16 ENCOUNTER — TELEPHONE (OUTPATIENT)
Dept: GASTROENTEROLOGY | Facility: CLINIC | Age: 52
End: 2022-08-16

## 2022-08-16 NOTE — TELEPHONE ENCOUNTER
----- Message from Viviana CHANCE sent at 8/16/2022  7:03 AM CDT -----  Regarding: Unusual pooping  This is gross and I am sorry. Over the past four days I have had I pooping spells at least once a day, sometimes twice in one day. It is stringy, stinky, and then ends with diarrhea. Also during it, I have slightly painful cramps. Now I feel like I need to go but there’s nothing that comes out, except when I wipe, there is a milky, creamy substance on my bottom and the tissue. Any thoughts or ideas?  Thanks for listening. (Reading)

## 2022-08-17 NOTE — TELEPHONE ENCOUNTER
Called and spoke to pt re: Tammi's recommendations-she VU and will get started on a fiber supplement. Pt does tell me she is feeling better today but she hasn't had a BM either. I advised pt try the dietary changes and adding fiber in for at least a week-she is to call me back if she doesn't feel better or starts to feel worse so I can message Tammi to order stool cultures at that time. Pt also advised to call me back with any further questions/problems.

## 2022-08-17 NOTE — TELEPHONE ENCOUNTER
Call her and see if she is better. I would suggest she add fiber no dairy or sweeteners.   If persists we can do cultures.   Smiley

## 2022-08-24 NOTE — PROGRESS NOTES
[facility] - PODIATRY    Today's Date: 08/25/2022     Patient Name: Viviana FLETCHER  MRN: [id number]  CSN: [id number]  PCP: Donna Hood MD  Referring Provider: No ref. provider found    SUBJECTIVE     Chief Complaint   Patient presents with   • Follow-up     Donna Hood MD PCP05/16/2022Return in about 1 month (around 8/19/2022) for Follow-up with Carmina Scruggs. - PT STATES was having pain in the ankle, feet are flatting and have been trying different shoes - pt denies pain - pt presents 1 month follow up with pain in left ankle      HPI: Viviana FLETCHER, a 51 y.o.female, comes to clinic as a(n) established patient complaining of left foot pain. Patient has h/o anxiety, IBS, MVP, vitamin D deficiency. Relates that since her last appointment, she has had mild improvement to her left hindfoot.  She did attempt different types of shoes and notes that having a little bit of a heel improves her pain.  Notes worsening pain with wearing flats.  She did have updated x-rays taken. Denies pain. Denies previous treatment. Denies any constitutional symptoms. No other pedal complaints at this time.    Past Medical History:   Diagnosis Date   • Allergic rhinitis    • Anxiety    • IBS (irritable bowel syndrome)    • MVP (mitral valve prolapse)    • Vitamin D deficiency      Past Surgical History:   Procedure Laterality Date   • COLONOSCOPY     • COLONOSCOPY N/A 7/1/2019    Negative for colitis   • COLONOSCOPY N/A 7/15/2022    Procedure: COLONOSCOPY WITH ANESTHESIA;  Surgeon: Danyell Peña MD;  Location: [facility] ENDOSCOPY;  Service: Gastroenterology;  Laterality: N/A;  Pre: brbpr, diarrhea  Post: normal  Donna Hood MD     Family History   Problem Relation Age of Onset   • Colon cancer Neg Hx    • Colon polyps Neg Hx      Social History     Socioeconomic History   • Marital status:    Tobacco Use   • Smoking status: Never Smoker   • Smokeless tobacco: Never Used   Substance and Sexual  "Activity   • Alcohol use: Yes     Comment: 3- 4 glasses of wine a week   • Drug use: No   • Sexual activity: Defer     Allergies   Allergen Reactions   • Sulfa Antibiotics Other (See Comments)     Current Outpatient Medications   Medication Sig Dispense Refill   • acebutolol (SECTRAL) 200 MG capsule Take 200 mg by mouth 2 (Two) Times a Day.     • ALPRAZolam (XANAX) 0.25 MG tablet Take 1 tablet by mouth Daily As Needed for Anxiety. 15 tablet 1   • amphetamine-dextroamphetamine XR (Adderall XR) 20 MG 24 hr capsule Take 1 capsule by mouth Every Morning For improved focus and attention 30 capsule 0   • cyanocobalamin 1000 MCG/ML injection      • ipratropium (ATROVENT) 0.06 % nasal spray INHALE 2 SPRAYS IN EACH NOSTRIL THREE TIMES DAILY AS NEEDED FOR ALLERGY AND CONGESTION 45 mL 3   • levonorgestrel (MIRENA) 20 MCG/24HR IUD 1 each by Intrauterine route 1 (One) Time.     • predniSONE 5 MG (21) tablet therapy pack dose pack Take 1 tablet by mouth Take As Directed. Take as directed on package instructions. 1 each 0   • Probiotic Product (PROBIOTIC ADVANCED PO) Take  by mouth Daily.     • Syringe/Needle, Disp, 25G X 1\" 3 ML misc Use with each B12 shot. 5 each 1   • valACYclovir (VALTREX) 500 MG tablet Take 1 tablet by mouth Daily. 90 tablet 3   • venlafaxine XR (EFFEXOR-XR) 150 MG 24 hr capsule Take 1 capsule by mouth Daily. For mood 90 capsule 3   • vitamin D (ERGOCALCIFEROL) 1.25 MG (79770 UT) capsule capsule Take 1 capsule by mouth 1 (One) Time Per Week. 12 capsule 3     No current facility-administered medications for this visit.     Review of Systems   Constitutional: Negative for chills and fever.   HENT: Negative for congestion.    Respiratory: Negative for shortness of breath.    Cardiovascular: Positive for leg swelling. Negative for chest pain.   Gastrointestinal: Negative for constipation, diarrhea, nausea and vomiting.   Musculoskeletal: Positive for arthralgias.        Foot pain   Skin: Negative for wound. "   Neurological: Negative for numbness.       OBJECTIVE     Vitals:    08/25/22 1519   BP: 120/82       PHYSICAL EXAM  GEN:   Accompanied by none.     Foot/Ankle Exam:       General:   Appearance: appears stated age and healthy    Orientation: AAOx3    Affect: appropriate    Gait: unimpaired    Assistance: independent    Shoe Gear:  Casual shoes (wedges)    VASCULAR      Right Foot Vascularity   Dorsalis pedis:  2+  Posterior tibial:  2+  Skin Temperature: warm    Edema Grading:  None  CFT:  3  Pedal Hair Growth:  Present  Varicosities: none       Left Foot Vascularity   Dorsalis pedis:  2+  Posterior tibial:  2+  Skin Temperature: warm    Edema Grading:  None  CFT:  3  Pedal Hair Growth:  Present  Varicosities: none        NEUROLOGIC     Right Foot Neurologic   Normal sensation    Light touch sensation:  Normal  Vibratory sensation:  Normal  Hot/Cold sensation: normal    Protective Sensation using [place]-Lindsey Monofilament:  10     Left Foot Neurologic   Normal sensation    Light touch sensation:  Normal  Vibratory sensation:  Normal  Hot/cold sensation: normal    Protective Sensation using [place]-Lindsey Monofilament:  10     MUSCULOSKELETAL      Right Foot Musculoskeletal   Ecchymosis:  None  Tenderness: none    Arch:  Pes planus (Mild pronation on weightbearing.)  Hallux valgus: Yes (mild, no crepitus)    Hallux limitus: No    Tailor's bunion: Yes       Left Foot Musculoskeletal   Ecchymosis:  None  Tenderness: subtalar joint    Arch:  Pes planus (Mild pronation on weightbearing.)  Hallux valgus: Yes (mild, no crepitus)    Hallux limitus: No    Tailor's bunion: Yes       MUSCLE STRENGTH     Right Foot Muscle Strength   Foot dorsiflexion:  5  Foot plantar flexion:  5  Foot inversion:  5  Foot eversion:  5     Left Foot Muscle Strength   Foot dorsiflexion:  5  Foot plantar flexion:  5  Foot inversion:  5  Foot eversion:  5     RANGE OF MOTION      Right Foot Range of Motion   Foot and ankle ROM within normal  limits       Left Foot Range of Motion   Foot and ankle ROM within normal limits       DERMATOLOGIC     Right Foot Dermatologic   Skin: skin intact    Nails: normal       Left Foot Dermatologic   Skin: skin intact    Nails: normal       TESTS     Left Foot Tests   Anterior drawer: negative    Varus tilt: negative    Syndesmosis squeeze: negative    Calcaneal squeeze: negative       Image:       RADIOLOGY/NUCLEAR:  No results found.    LABORATORY/CULTURE RESULTS:      PATHOLOGY RESULTS:       ASSESSMENT/PLAN     Diagnoses and all orders for this visit:    1. Sinus tarsi syndrome of left ankle (Primary)    2. Foot pain, left      Comprehensive lower extremity examination and evaluation was performed.  Discussed findings and treatment plan including risks, benefits, and treatment options with patient in detail. Patient agreed with treatment plan.  Patient has a mild transient sinus tarsitis that is exacerbated by increased ambulation and wearing shoes without heel lift or flats.   Suggest continuation of conservative therapy with wearing good shoe choices and utilization of OTC arch supports.  Reviewed x-rays with patient.  An After Visit Summary was printed and given to the patient at discharge, including (if requested) any available informative/educational handouts regarding diagnosis, treatment, or medications. All questions were answered to patient/family satisfaction. Should symptoms fail to improve or worsen they agree to call or return to clinic or to go to the Emergency Department. Discussed the importance of following up with any needed screening tests/labs/specialist appointments and any requested follow-up recommended by me today. Importance of maintaining follow-up discussed and patient accepts that missed appointments can delay diagnosis and potentially lead to worsening of conditions.  Return if symptoms worsen or fail to improve., or sooner if acute issues arise.    Lab Frequency Next Occurrence   Follow  Anesthesia Guidelines / Standing Orders Once 06/11/2019   Obtain Informed Consent Once 06/16/2019   Follow Anesthesia Guidelines / Standing Orders Once 07/13/2022   Obtain Informed Consent Once 07/18/2022   Diet: Once 07/15/2022   CT Enterography Abdomen Pelvis w Contrast Once 07/20/2022       This document has been electronically signed by Frederick Fry DPM on August 25, 2022 16:03 CDT

## 2022-08-25 ENCOUNTER — OFFICE VISIT (OUTPATIENT)
Dept: PODIATRY | Facility: CLINIC | Age: 52
End: 2022-08-25

## 2022-08-25 VITALS
WEIGHT: 143 LBS | BODY MASS INDEX: 28.07 KG/M2 | SYSTOLIC BLOOD PRESSURE: 120 MMHG | HEIGHT: 60 IN | DIASTOLIC BLOOD PRESSURE: 82 MMHG

## 2022-08-25 DIAGNOSIS — M25.572 SINUS TARSI SYNDROME OF LEFT ANKLE: Primary | ICD-10-CM

## 2022-08-25 DIAGNOSIS — M79.672 FOOT PAIN, LEFT: ICD-10-CM

## 2022-08-25 PROCEDURE — 99213 OFFICE O/P EST LOW 20 MIN: CPT | Performed by: PODIATRIST

## 2022-08-31 ENCOUNTER — TELEPHONE (OUTPATIENT)
Dept: GASTROENTEROLOGY | Facility: CLINIC | Age: 52
End: 2022-08-31

## 2022-08-31 DIAGNOSIS — R30.0 DYSURIA: Primary | ICD-10-CM

## 2022-08-31 DIAGNOSIS — R30.0 DYSURIA: ICD-10-CM

## 2022-08-31 LAB
BACTERIA: NEGATIVE /HPF
BILIRUBIN URINE: NEGATIVE
BLOOD, URINE: NEGATIVE
CLARITY: CLEAR
COLOR: YELLOW
CRYSTALS, UA: ABNORMAL /HPF
EPITHELIAL CELLS, UA: 6 /HPF (ref 0–5)
GLUCOSE URINE: NEGATIVE MG/DL
HYALINE CASTS: 4 /HPF (ref 0–8)
KETONES, URINE: ABNORMAL MG/DL
LEUKOCYTE ESTERASE, URINE: ABNORMAL
NITRITE, URINE: NEGATIVE
PH UA: 6 (ref 5–8)
PROTEIN UA: NEGATIVE MG/DL
RBC UA: 3 /HPF (ref 0–4)
SPECIFIC GRAVITY UA: 1.02 (ref 1–1.03)
UROBILINOGEN, URINE: 1 E.U./DL
WBC UA: 18 /HPF (ref 0–5)

## 2022-09-01 ENCOUNTER — TELEPHONE (OUTPATIENT)
Dept: OBGYN CLINIC | Age: 52
End: 2022-09-01

## 2022-09-01 NOTE — TELEPHONE ENCOUNTER
Sent fflickt message to pt with Tammi's recommendations-she was advised in message to call me back with any questions or problems.

## 2022-09-01 NOTE — TELEPHONE ENCOUNTER
She can always stop the fiber if she feels that it is the source of some of her urgency to go. Also if continues fiber make sure drinking plenty of water with it as well. I cannot see any urinalysis results in our system.   Deepti Cox, APRN  9/1/2022

## 2022-09-01 NOTE — TELEPHONE ENCOUNTER
"Pt sent this message this morning through [employer]: \"I sent a message to my ob/gyn about a possible UTI yesterday and they sent in a urinalysis. Can you see those results? I am just a hot mess.\"  "

## 2022-09-02 ENCOUNTER — TELEPHONE (OUTPATIENT)
Dept: OBGYN CLINIC | Age: 52
End: 2022-09-02

## 2022-09-02 LAB
ORGANISM: ABNORMAL
URINE CULTURE, ROUTINE: ABNORMAL
URINE CULTURE, ROUTINE: ABNORMAL

## 2022-09-27 ENCOUNTER — OFFICE VISIT (OUTPATIENT)
Dept: FAMILY MEDICINE CLINIC | Facility: CLINIC | Age: 52
End: 2022-09-27
Payer: COMMERCIAL

## 2022-09-27 VITALS
HEART RATE: 68 BPM | SYSTOLIC BLOOD PRESSURE: 118 MMHG | HEIGHT: 60 IN | TEMPERATURE: 98 F | WEIGHT: 144.6 LBS | BODY MASS INDEX: 28.39 KG/M2 | OXYGEN SATURATION: 96 % | DIASTOLIC BLOOD PRESSURE: 74 MMHG

## 2022-09-27 DIAGNOSIS — I34.1 MITRAL VALVE PROLAPSE: ICD-10-CM

## 2022-09-27 DIAGNOSIS — E53.8 B12 DEFICIENCY: ICD-10-CM

## 2022-09-27 DIAGNOSIS — Z00.00 ANNUAL PHYSICAL EXAM: Primary | ICD-10-CM

## 2022-09-27 DIAGNOSIS — E55.9 VITAMIN D DEFICIENCY: ICD-10-CM

## 2022-09-27 DIAGNOSIS — F90.9 ATTENTION DEFICIT HYPERACTIVITY DISORDER (ADHD), UNSPECIFIED ADHD TYPE: ICD-10-CM

## 2022-09-27 DIAGNOSIS — R00.2 PALPITATIONS: ICD-10-CM

## 2022-09-27 DIAGNOSIS — J30.89 NON-SEASONAL ALLERGIC RHINITIS DUE TO OTHER ALLERGIC TRIGGER: ICD-10-CM

## 2022-09-27 DIAGNOSIS — Z82.49 FAMILY HISTORY OF ISCHEMIC HEART DISEASE (IHD): ICD-10-CM

## 2022-09-27 DIAGNOSIS — Z00.00 LABORATORY TESTS ORDERED AS PART OF A COMPLETE PHYSICAL EXAM (CPE): ICD-10-CM

## 2022-09-27 DIAGNOSIS — E78.5 HYPERLIPIDEMIA, UNSPECIFIED HYPERLIPIDEMIA TYPE: ICD-10-CM

## 2022-09-27 DIAGNOSIS — R42 VERTIGO: ICD-10-CM

## 2022-09-27 RX ORDER — DEXTROAMPHETAMINE SACCHARATE, AMPHETAMINE ASPARTATE MONOHYDRATE, DEXTROAMPHETAMINE SULFATE AND AMPHETAMINE SULFATE 5; 5; 5; 5 MG/1; MG/1; MG/1; MG/1
20 CAPSULE, EXTENDED RELEASE ORAL EVERY MORNING
Qty: 30 CAPSULE | Refills: 0 | Status: SHIPPED | OUTPATIENT
Start: 2022-09-27 | End: 2022-11-08 | Stop reason: SDUPTHER

## 2022-09-27 RX ORDER — FLUTICASONE PROPIONATE 50 MCG
2 SPRAY, SUSPENSION (ML) NASAL DAILY
Qty: 16 G | Refills: 11 | Status: SHIPPED | OUTPATIENT
Start: 2022-09-27

## 2022-09-27 RX ORDER — MECLIZINE HYDROCHLORIDE 25 MG/1
25 TABLET ORAL 3 TIMES DAILY PRN
Qty: 30 TABLET | Refills: 1 | Status: SHIPPED | OUTPATIENT
Start: 2022-09-27 | End: 2022-11-08

## 2022-10-03 ENCOUNTER — PATIENT MESSAGE (OUTPATIENT)
Dept: FAMILY MEDICINE CLINIC | Facility: CLINIC | Age: 52
End: 2022-10-03

## 2022-10-03 DIAGNOSIS — R00.2 PALPITATIONS: ICD-10-CM

## 2022-10-03 DIAGNOSIS — R42 INTERMITTENT VERTIGO: Primary | ICD-10-CM

## 2022-10-03 DIAGNOSIS — R42 EPISODIC LIGHTHEADEDNESS: ICD-10-CM

## 2022-10-03 DIAGNOSIS — I34.1 MITRAL VALVE PROLAPSE: ICD-10-CM

## 2022-10-11 RX ORDER — SCOLOPAMINE TRANSDERMAL SYSTEM 1 MG/1
1 PATCH, EXTENDED RELEASE TRANSDERMAL
Qty: 4 PATCH | Refills: 1 | Status: SHIPPED | OUTPATIENT
Start: 2022-10-11 | End: 2022-11-08

## 2022-10-12 ENCOUNTER — DOCUMENTATION (OUTPATIENT)
Dept: FAMILY MEDICINE CLINIC | Facility: CLINIC | Age: 52
End: 2022-10-12

## 2022-11-01 DIAGNOSIS — E53.8 B12 DEFICIENCY: ICD-10-CM

## 2022-11-01 RX ORDER — NEEDLES, FILTER 19GX1 1/2"
NEEDLE, DISPOSABLE MISCELLANEOUS
Qty: 5 EACH | Refills: 5 | Status: SHIPPED | OUTPATIENT
Start: 2022-11-01

## 2022-11-01 RX ORDER — ACEBUTOLOL HYDROCHLORIDE 200 MG/1
CAPSULE ORAL
Qty: 180 CAPSULE | Refills: 3 | Status: SHIPPED | OUTPATIENT
Start: 2022-11-01 | End: 2022-11-28

## 2022-11-08 ENCOUNTER — OFFICE VISIT (OUTPATIENT)
Dept: FAMILY MEDICINE CLINIC | Facility: CLINIC | Age: 52
End: 2022-11-08

## 2022-11-08 VITALS
DIASTOLIC BLOOD PRESSURE: 73 MMHG | HEIGHT: 60 IN | BODY MASS INDEX: 28.35 KG/M2 | HEART RATE: 66 BPM | SYSTOLIC BLOOD PRESSURE: 108 MMHG | WEIGHT: 144.4 LBS | TEMPERATURE: 97.6 F | OXYGEN SATURATION: 98 %

## 2022-11-08 DIAGNOSIS — F90.9 ATTENTION DEFICIT HYPERACTIVITY DISORDER (ADHD), UNSPECIFIED ADHD TYPE: ICD-10-CM

## 2022-11-08 DIAGNOSIS — R42 INTERMITTENT VERTIGO: Primary | ICD-10-CM

## 2022-11-08 DIAGNOSIS — R42 EPISODIC LIGHTHEADEDNESS: ICD-10-CM

## 2022-11-08 DIAGNOSIS — H65.493 CHRONIC MEE (MIDDLE EAR EFFUSION), BILATERAL: ICD-10-CM

## 2022-11-08 PROCEDURE — 99213 OFFICE O/P EST LOW 20 MIN: CPT | Performed by: FAMILY MEDICINE

## 2022-11-08 RX ORDER — FEXOFENADINE HCL AND PSEUDOEPHEDRINE HCI 180; 240 MG/1; MG/1
1 TABLET, EXTENDED RELEASE ORAL DAILY
Qty: 30 TABLET | Refills: 11 | Status: SHIPPED | OUTPATIENT
Start: 2022-11-08 | End: 2022-11-20 | Stop reason: SDUPTHER

## 2022-11-08 RX ORDER — DEXTROAMPHETAMINE SACCHARATE, AMPHETAMINE ASPARTATE MONOHYDRATE, DEXTROAMPHETAMINE SULFATE AND AMPHETAMINE SULFATE 5; 5; 5; 5 MG/1; MG/1; MG/1; MG/1
20 CAPSULE, EXTENDED RELEASE ORAL EVERY MORNING
Qty: 30 CAPSULE | Refills: 0 | Status: SHIPPED | OUTPATIENT
Start: 2022-11-08 | End: 2022-12-21 | Stop reason: SDUPTHER

## 2022-11-09 ENCOUNTER — PATIENT MESSAGE (OUTPATIENT)
Dept: FAMILY MEDICINE CLINIC | Facility: CLINIC | Age: 52
End: 2022-11-09

## 2022-11-18 ENCOUNTER — PATIENT MESSAGE (OUTPATIENT)
Dept: FAMILY MEDICINE CLINIC | Facility: CLINIC | Age: 52
End: 2022-11-18

## 2022-11-18 DIAGNOSIS — H65.493 CHRONIC MEE (MIDDLE EAR EFFUSION), BILATERAL: ICD-10-CM

## 2022-11-18 DIAGNOSIS — R42 INTERMITTENT VERTIGO: ICD-10-CM

## 2022-11-18 DIAGNOSIS — R42 EPISODIC LIGHTHEADEDNESS: ICD-10-CM

## 2022-11-20 RX ORDER — FEXOFENADINE HYDROCHLORIDE AND PSEUDOEPHEDRINE HYDROCHLORIDE 180; 240 MG/1; MG/1
1 TABLET, FILM COATED, EXTENDED RELEASE ORAL DAILY
Qty: 30 TABLET | Refills: 11 | Status: SHIPPED | OUTPATIENT
Start: 2022-11-20

## 2022-11-20 NOTE — TELEPHONE ENCOUNTER
From: Viviana FLETCHER  To: Donna Hood MD  Sent: 11/18/2022 12:44 PM CST  Subject: Allegra D    I started taking the Allegra D 24 hour Extended Release approximately one month ago. On November 8, at the doctors appointment Dr Hood and I discussed this and she prescribed Allegra D 24hr ER. When I picked up at pharmacy, it was generic brand. I have been taking the generic since the 10th and I am back to feeling like I have fluid building up, not dizzy yet. Is there a way to get the pharmacy to fill the script with the brand??

## 2022-11-21 NOTE — PROGRESS NOTES
Chief Complaint  Dizziness    Subjective        History of Present Illness  Viviana FLETCHER is a 52 y.o. female who presents to [facility]     Answers for HPI/ROS submitted by the patient on 11/7/2022  Please describe your symptoms.: Follow-up about dizziness.  Have you had these symptoms before?: Yes  How long have you been having these symptoms?: 1-2 weeks  Please list any medications you are currently taking for this condition.: Allegra D  What is the primary reason for your visit?: Other    Pt now recalls olamide diving in addition to travel that could have been triggers for the dizziness.  Not running into walls now.    Feeling better after starting Allegra D.  Ears feel less stuffy too.  No cardiac complaints while on Allegra D.    Result Review :            Never went to do the head CT or the Carotid US since she was feeling better.  Never did the labs either.        Review of Systems     Past Medical History:   Diagnosis Date   • ADHD (attention deficit hyperactivity disorder) Lifetime, but more recently in 2018/2019    Started taking meds in 2019/2020 and it has helped me focus   • Allergic rhinitis    • Anxiety    • IBS (irritable bowel syndrome)    • MVP (mitral valve prolapse)    • Myocardial infarction (HCC)    • Vitamin D deficiency      Pt reported the MI on her intake paperwork online but I don't have records showing she had an MI.    Outpatient Medications Prior to Visit   Medication Sig Dispense Refill   • acebutolol (SECTRAL) 200 MG capsule Take 200 mg by mouth 2 (Two) Times a Day.     • ALPRAZolam (XANAX) 0.25 MG tablet Take 1 tablet by mouth Daily As Needed for Anxiety. 15 tablet 1   • cyanocobalamin 1000 MCG/ML injection      • fluticasone (Flonase) 50 MCG/ACT nasal spray 2 sprays into the nostril(s) as directed by provider Daily. To decrease swelling in nose and pressure in ears 16 g 11   • ipratropium (ATROVENT) 0.06 % nasal spray INHALE 2 SPRAYS IN EACH NOSTRIL  "THREE TIMES DAILY AS NEEDED FOR ALLERGY AND CONGESTION 45 mL 3   • levonorgestrel (MIRENA) 20 MCG/24HR IUD 1 each by Intrauterine route 1 (One) Time.     • Probiotic Product (PROBIOTIC ADVANCED PO) Take  by mouth Daily.     • Syringe/Needle, Disp, (BD Integra Syringe) 25G X 1\" 3 ML misc USE WITH B12 SHOT 5 each 5   • valACYclovir (VALTREX) 500 MG tablet Take 1 tablet by mouth Daily. 90 tablet 3   • venlafaxine XR (EFFEXOR-XR) 150 MG 24 hr capsule Take 1 capsule by mouth Daily. For mood 90 capsule 3   • vitamin D (ERGOCALCIFEROL) 1.25 MG (41101 UT) capsule capsule Take 1 capsule by mouth 1 (One) Time Per Week. 12 capsule 3   • amphetamine-dextroamphetamine XR (Adderall XR) 20 MG 24 hr capsule Take 1 capsule by mouth Every Morning For improved focus and attention 30 capsule 0   • meclizine (ANTIVERT) 25 MG tablet Take 1 tablet by mouth 3 (Three) Times a Day As Needed for Dizziness. 30 tablet 1   • Scopolamine (Transderm-Scop, 1.5 MG,) 1 MG/3DAYS patch Place 1 patch on the skin as directed by provider Every 72 (Seventy-Two) Hours. 4 patch 1     No facility-administered medications prior to visit.        Objective   Vital Signs:   /73 (BP Location: Left arm, Patient Position: Sitting, Cuff Size: Adult)   Pulse 66   Temp 97.6 °F (36.4 °C) (Temporal)   Ht 152.4 cm (60\")   Wt 65.5 kg (144 lb 6.4 oz)   SpO2 98%   BMI 28.20 kg/m²       Physical Exam  Vitals reviewed.   HENT:      Ears:      Comments: Slt bulge and decreased LR to each TM which is normal color.  EAC's clear.  NP clear.  Eyes:      Extraocular Movements: Extraocular movements intact.   Cardiovascular:      Rate and Rhythm: Normal rate and regular rhythm.   Pulmonary:      Effort: Pulmonary effort is normal. No respiratory distress.      Breath sounds: Normal breath sounds.   Abdominal:      General: Bowel sounds are normal.      Palpations: Abdomen is soft.   Skin:     General: Skin is warm.      Coloration: Skin is not pale.   Neurological:      " Mental Status: She is alert and oriented to person, place, and time.      Comments: No objective or subjective dizziness today.  No tremor.   Psychiatric:         Mood and Affect: Mood normal.         Behavior: Behavior normal.                 Assessment and Plan    Diagnoses and all orders for this visit:    1. Intermittent vertigo (Primary)  -     fexofenadine-pseudoephedrine (Allegra-D Allergy & Congestion) 180-240 MG per 24 hr tablet; Take 1 tablet by mouth Daily. For inner ear symptoms and allergy control  Dispense: 30 tablet; Refill: 11    2. Episodic lightheadedness  -     fexofenadine-pseudoephedrine (Allegra-D Allergy & Congestion) 180-240 MG per 24 hr tablet; Take 1 tablet by mouth Daily. For inner ear symptoms and allergy control  Dispense: 30 tablet; Refill: 11    3. Chronic ROHAN (middle ear effusion), bilateral  -     fexofenadine-pseudoephedrine (Allegra-D Allergy & Congestion) 180-240 MG per 24 hr tablet; Take 1 tablet by mouth Daily. For inner ear symptoms and allergy control  Dispense: 30 tablet; Refill: 11    4. Attention deficit hyperactivity disorder (ADHD), unspecified ADHD type  -     amphetamine-dextroamphetamine XR (Adderall XR) 20 MG 24 hr capsule; Take 1 capsule by mouth Every Morning For improved focus and attention  Dispense: 30 capsule; Refill: 0            Follow Up   Return if symptoms worsen or fail to improve.    Patient was given instructions and counseling regarding her condition or for health maintenance advice.   Patient Instructions     - Track for any heart sx while on Allegra D  - See if dizziness resolves with time the further out from olamide diving you go and as seasons change.           Please see specific information/handouts pulled into the AVS if appropriate.       Donna Hood M.D.  [facility]

## 2022-11-27 DIAGNOSIS — R00.2 PALPITATIONS: ICD-10-CM

## 2022-11-27 DIAGNOSIS — I34.1 MITRAL VALVE PROLAPSE: ICD-10-CM

## 2022-11-28 RX ORDER — ACEBUTOLOL HYDROCHLORIDE 200 MG/1
CAPSULE ORAL
Qty: 180 CAPSULE | Refills: 3 | Status: SHIPPED | OUTPATIENT
Start: 2022-11-28

## 2022-11-28 NOTE — TELEPHONE ENCOUNTER
Rx Refill Note  Requested Prescriptions     Pending Prescriptions Disp Refills   • ALPRAZolam (XANAX) 0.25 MG tablet [Pharmacy Med Name: ALPRAZOLAM 0.25MG TABLETS] 15 tablet      Sig: TAKE 1 TABLET BY MOUTH DAILY AS NEEDED FOR ANXIETY      Last office visit with prescribing clinician: 11/8/2022      Next office visit with prescribing clinician: 10/2/2023            ILIA Longo  11/28/22, 10:11 CST

## 2022-11-29 RX ORDER — ALPRAZOLAM 0.25 MG/1
0.25 TABLET ORAL DAILY PRN
Qty: 15 TABLET | Refills: 1 | Status: SHIPPED | OUTPATIENT
Start: 2022-11-29 | End: 2022-12-21 | Stop reason: SDUPTHER

## 2022-11-30 ENCOUNTER — OFFICE VISIT (OUTPATIENT)
Dept: GASTROENTEROLOGY | Facility: CLINIC | Age: 52
End: 2022-11-30

## 2022-11-30 VITALS
HEIGHT: 60 IN | BODY MASS INDEX: 27.88 KG/M2 | WEIGHT: 142 LBS | DIASTOLIC BLOOD PRESSURE: 68 MMHG | OXYGEN SATURATION: 97 % | HEART RATE: 63 BPM | TEMPERATURE: 97.5 F | SYSTOLIC BLOOD PRESSURE: 110 MMHG

## 2022-11-30 DIAGNOSIS — K58.2 IRRITABLE BOWEL SYNDROME WITH BOTH CONSTIPATION AND DIARRHEA: Primary | ICD-10-CM

## 2022-11-30 DIAGNOSIS — R93.5 ABNORMAL CT OF THE ABDOMEN: ICD-10-CM

## 2022-11-30 PROCEDURE — 99214 OFFICE O/P EST MOD 30 MIN: CPT | Performed by: INTERNAL MEDICINE

## 2022-11-30 NOTE — PROGRESS NOTES
"Primary Physician: Donna Hood MD    Chief Complaint   Patient presents with   • Follow-up     Pt presents today for diarrhea and rectal bleeding follow up-had colon 7/15/2022 and CTE 7/22/2022; Pt states her bowel are still loose/soft and she feels like she has to go all the time-also states she is passing mucus and notices it mostly when she wipes but has seen it in her underwear as well        Subjective     Viviana Arzate is a 52 y.o. female.    HPI   Abdominal pain associate with change in bowel pattern  History dates back to 9/2016 when she had C. difficile colitis and colonoscopy by Dr. Gracia showed mild active colitis felt to be related to infection.  CT scan 5/2019 showed colitis in the left colon.  She was admitted to hospital and treated supportively.  Colonoscopy 7/2019 showed decrease mucosal vascular pattern in the left colon, however biopsies were normal.  There have been episodic phone calls from the patient regarding both constipation and diarrheal complaints along with hemorrhoids.  She was most recently seen in July 2022 again with complaints of lower abdominal pain and diarrhea.  Stool cultures were negative.  Colonoscopy was repeated 7/2022 and again biopsies throughout the colon were normal.  CTE 7/2022 normal.  Her BMs alt between diarrhea and constipation.  She has tried metamucil which led to diarrhea.  Now she has the urge to go, but can't. Once she starts to move bowels, then she needs 30 minutes to evacuate multiple BMs.   She has mucous.  BMs are usually \"soft serve ice cream\".  She has constipation much less than looser stool.  She is frustrated that she isn't able to have what she calls a normal BM.  No melena/hematochezia.      Past Medical History:   Diagnosis Date   • ADHD (attention deficit hyperactivity disorder)    • Allergic rhinitis    • Anxiety    • Family history of colonic polyps    • IBS (irritable bowel syndrome)    • MVP (mitral valve prolapse)    • Myocardial " "infarction (HCC)    • Vitamin D deficiency        Past Surgical History:   Procedure Laterality Date   • COLONOSCOPY N/A 07/01/2019    Biopsies from left colon normal.   • COLONOSCOPY N/A 07/15/2022    Colon biopsies throughout normal.  Repeat colonoscopy in 10 years.        Current Outpatient Medications:   •  acebutolol (SECTRAL) 200 MG capsule, Take 200 mg by mouth 2 (Two) Times a Day., Disp: , Rfl:   •  Allegra-D Allergy & Congestion 180-240 MG per 24 hr tablet, Take 1 tablet by mouth Daily. For inner ear symptoms and allergy control, Disp: 30 tablet, Rfl: 11  •  ALPRAZolam (XANAX) 0.25 MG tablet, TAKE 1 TABLET BY MOUTH DAILY AS NEEDED FOR ANXIETY, Disp: 15 tablet, Rfl: 1  •  amphetamine-dextroamphetamine XR (Adderall XR) 20 MG 24 hr capsule, Take 1 capsule by mouth Every Morning For improved focus and attention, Disp: 30 capsule, Rfl: 0  •  cyanocobalamin 1000 MCG/ML injection, Inject 1,000 mcg under the skin into the appropriate area as directed Every 14 (Fourteen) Days., Disp: , Rfl:   •  fluticasone (Flonase) 50 MCG/ACT nasal spray, 2 sprays into the nostril(s) as directed by provider Daily. To decrease swelling in nose and pressure in ears, Disp: 16 g, Rfl: 11  •  ipratropium (ATROVENT) 0.06 % nasal spray, INHALE 2 SPRAYS IN EACH NOSTRIL THREE TIMES DAILY AS NEEDED FOR ALLERGY AND CONGESTION, Disp: 45 mL, Rfl: 3  •  levonorgestrel (MIRENA) 20 MCG/24HR IUD, 1 each by Intrauterine route 1 (One) Time., Disp: , Rfl:   •  Probiotic Product (PROBIOTIC ADVANCED PO), Take 1 capsule by mouth Daily., Disp: , Rfl:   •  Syringe/Needle, Disp, (BD Integra Syringe) 25G X 1\" 3 ML misc, USE WITH B12 SHOT, Disp: 5 each, Rfl: 5  •  valACYclovir (VALTREX) 500 MG tablet, Take 1 tablet by mouth Daily. (Patient taking differently: Take 500 mg by mouth As Needed.), Disp: 90 tablet, Rfl: 3  •  venlafaxine XR (EFFEXOR-XR) 150 MG 24 hr capsule, Take 1 capsule by mouth Daily. For mood, Disp: 90 capsule, Rfl: 3  •  vitamin D " "(ERGOCALCIFEROL) 1.25 MG (91758 UT) capsule capsule, Take 1 capsule by mouth 1 (One) Time Per Week., Disp: 12 capsule, Rfl: 3    Allergies   Allergen Reactions   • Sulfa Antibiotics Other (See Comments)     Pt doesn't remember reaction-was a child       Social History     Socioeconomic History   • Marital status:    Tobacco Use   • Smoking status: Never   • Smokeless tobacco: Never   Vaping Use   • Vaping Use: Never used   Substance and Sexual Activity   • Alcohol use: Yes     Alcohol/week: 8.0 standard drinks     Types: 5 Glasses of wine, 3 Shots of liquor per week     Comment: Occasional-3- 4 glasses of wine a week   • Drug use: No   • Sexual activity: Yes     Partners: Male     Birth control/protection: I.U.D.     Comment: Mirena       Family History   Problem Relation Age of Onset   • Colon polyps Mother         < 60 years of age   • Irritable bowel syndrome Mother         Possibly Crohn's or UC but pt unsure   • Heart disease Father          of heart disease   • Colon cancer Neg Hx    • Esophageal cancer Neg Hx    • Liver cancer Neg Hx    • Stomach cancer Neg Hx    • Rectal cancer Neg Hx    • Liver disease Neg Hx        Review of Systems   Respiratory: Negative for shortness of breath.    Cardiovascular: Negative for chest pain.       Objective     /68 (BP Location: Left arm, Patient Position: Sitting, Cuff Size: Adult)   Pulse 63   Temp 97.5 °F (36.4 °C) (Infrared)   Ht 152.4 cm (60\")   Wt 64.4 kg (142 lb)   SpO2 97%   Breastfeeding No   BMI 27.73 kg/m²     Physical Exam  Constitutional:       Appearance: She is well-developed.   Pulmonary:      Effort: Pulmonary effort is normal.   Musculoskeletal:         General: Normal range of motion.   Skin:     General: Skin is warm.   Neurological:      Mental Status: She is alert and oriented to person, place, and time.   Psychiatric:         Behavior: Behavior normal.      Comments: She intermittently got tearful.         Lab Results - Last " 18 Months   Lab Units 06/30/22  1047 06/06/22  0747 09/20/21  1544   GLUCOSE mg/dL 84 75 90   BUN mg/dL 10 13 10   CREATININE mg/dL 1.10* 0.97 0.91   SODIUM mmol/L 139 141 140   POTASSIUM mmol/L 4.1 4.2 3.9   CHLORIDE mmol/L 102 104 104   CO2 mmol/L 29.6* 26.8 28.0   TOTAL PROTEIN g/dL 7.0 7.1 7.0   ALBUMIN g/dL 4.50 4.50 4.50   ALT (SGPT) U/L 13 22 15   AST (SGOT) U/L 26 36* 41*   ALK PHOS U/L 57 61 64   BILIRUBIN mg/dL 0.5 0.4 0.2   GLOBULIN gm/dL 2.5 2.6 2.5   CRP mg/dL 3.83*  --   --    SED RATE mm/hr 12  --   --        Lab Results - Last 18 Months   Lab Units 06/30/22  1047 06/06/22  0747   HEMOGLOBIN g/dL 13.6 14.3   HEMATOCRIT % 39.1 43.2   MCV fL 94.9 98.0*   WBC 10*3/mm3 11.65* 7.23   RDW % 11.3* 11.6*   MPV fL 10.3 10.7   PLATELETS 10*3/mm3 259 268       Lab Results - Last 18 Months   Lab Units 06/06/22  0747 09/20/21  1544   TSH uIU/mL 1.690 0.689   VIT D 25 HYDROXY ng/ml 75.4 89.5        Lab Results - Last 18 Months   Lab Units 03/21/22  1142   HEP B S AG  Non-Reactive     Pathology from colonoscopy 7/2019:  Final Diagnosis   1.  Colon, left, biopsy:  Benign colonic mucosa, no diagnostic abnormality  Negative for active inflammation  Negative for ischemic changes  Negative for microscopic colitis  Negative for dysplasia     2.  Colon, rectum, biopsy:  Benign colonic mucosa, no diagnostic abnormality  Negative for active inflammation  Negative for ischemic changes  Negative for microscopic colitis  Negative for dysplasia      Electronically signed by Aurora Pino MD on 7/2/2019 at 0948     Pathology from colonoscopy 7/2022 :  Final Diagnosis   1.  Right colon, biopsies:  Colonic mucosa with no significant pathologic changes.  No evidence of active or microscopic colitis.    2.  Transverse colon, biopsies:  Colonic mucosa with no significant pathologic changes.  No evidence of active or microscopic colitis.    3.  Left colon, biopsies:  Colonic mucosa with no significant pathologic changes.  No  evidence of active or microscopic colitis.    4.  Rectum, biopsies:  Colonic mucosa with no significant pathologic changes.  No evidence of active or microscopic colitis.   Electronically signed by Sumeet Perales MD on 7/18/2022 at 1426         EXAMINATION: CT abdomen pelvis with contrast enterography 7/22/2022     DOSE: 296 mGycm. All CT scans are performed using dose optimization  techniques as appropriate to the performed exam and including at least  one of the following: Automated exposure control, adjustment of the mA  and/or kV according to size, and the use of the iterative reconstruction  technique..     HISTORY: Right lower quadrant pain and diarrhea     FINDINGS: Multiple contiguous axial images are obtained through the  abdomen and pelvis following intravenous contrast infusion as well as  ingestion of water. The study is performed per the CT enterography  protocol. Reformatted images are obtained the sagittal and coronal  projections from the original data set.     Mild dependent atelectasis is noted within both posterior lung bases.  Lung bases are otherwise clear. The base of the heart is unremarkable.  There is no pericardial effusion.     The liver is homogeneous in density with normal enhancement and no  evidence of discrete mass.     The gallbladder is unremarkable. There is no biliary dilatation present.     The pancreas is normal morphology. There is no mass or ductal  dilatation. No radiographic evidence of acute pancreatitis.     The spleen is normal in appearance.     The retroperitoneum is normal in appearance with no evidence of  abdominal aortic aneurysm. No evidence of retroperitoneal hematoma or  adenopathy.     There is some retained fecal material within the distal colon including  the rectosigmoid. No air-fluid levels are noted within the right and  transverse colon related to the nature of the exam. I do not see any  evidence of abnormal mucosal enhancement within the colon. Small  bowel  is normal distribution and mucosal appearance. No abnormal hyperemia of  the mucosa or evidence of stricturing. The terminal ileum is normal in  appearance. No radiographic evidence of acute appendicitis.     The adrenals are unremarkable. There is normal enhancement of the  kidneys. Both ureters are decompressed and normal in appearance. The  small bowel mesentery is normal in appearance with no mass or  adenopathy. A small fat-containing periumbilical hernia is present.     No acute bony abnormalities are demonstrated.     An IUD is centrally positioned within the uterus. A dominant follicle or  small right ovarian cyst is present measuring 1.7 cm in size. There is a  3.1 cm left ovarian cyst present. There is no free fluid in the  cul-de-sac.     IMPRESSION:  1.. There is some retained fecal material within the more distal colon.  The right and transverse colon is fluid-filled related to the nature of  the study with air-fluid levels. The small bowel is normal in  distribution and mucosal appearance with no evidence of hyperemia or  stricture. The terminal ileum is normal in appearance. No localized  inflammatory process is demonstrated.  2. No evidence of nephrolithiasis or obstructive uropathy.  3. Small fat-containing periumbilical hernia.  4. IUD centrally positioned within the uterus. Cysts are noted of both  ovaries. There is no free fluid in the cul-de-sac.  This report was finalized on 07/22/2022 16:26 by Dr. Sherman Almanza MD.    IMPRESSION/PLAN:    Assessment & Plan      Problem List Items Addressed This Visit        Gastrointestinal Abdominal     Irritable bowel syndrome with both constipation and diarrhea - Primary    Overview     History dates back to 9/2016 when she had C. difficile colitis and colonoscopy by Dr. Gracia showed mild active colitis felt to be related to infection.  CT scan 5/2019 showed colitis in the left colon.  She was admitted to hospital and treated supportively.   Colonoscopy 7/2019 showed decrease mucosal vascular pattern in the left colon, however biopsies were normal.  There have been episodic phone calls from the patient regarding both constipation and diarrheal complaints along with hemorrhoids.  Seen July 2022 with complaints of lower abdominal pain and diarrhea.  Stool cultures were negative.  Colonoscopy was repeated 7/2022 and again biopsies throughout the colon were normal.  CTE 7/2022 normal.  Labs normal 6/2022.    She still is not satisfied with her bowel movement pattern.  I advised fiber in a different manner because she did not tolerate Metamucil.  She will explore Citrucel.  I also advise low-dose liquid Imodium especially if going out to eat as this is a great concern of hers.  She is afraid that she will need to spend 30 minutes in a restaurant restroom after eating.  I also suggested that we can make referral to [facility].  They may want to consider anorectal motility.  I reviewed what this is and she would like to think about it.  She knows to call my office should she change her mind.  We would probably start with a simple referral to the for assessment and recommendation.         RESOLVED: Abnormal CT of the abdomen    Overview     Colitis and left colon seen 5/2019.  Follow-up CT enterography 7/2022 normal.            MEDICAL COMPLEXITY must have 2 out of 3   Moderate Complexity Level 4                                                                                                              1 of the following medical problems:                                                                                               [x]One chronic illness with mild exacerbation                                                                                []Two or more stable chronic illness                                                                                              []One new problem  []One acute illness with systemic symptoms     Complexity of  Data  Reviewed (1 out of the 3 following categories)                                             Category 1 tests, documents, historian (must have 3 points)                                                      []Review of prior external records  [x]Review of results of unique tests  []Ordering unique tests   []Assessment requires an independent historian   Category 2 Interpretation of tests     []Independent interpretation of test read by another doc   Category 3 Discuss Management/tests  []Discussion with external physician     Risk of complications and/or morbidity                                                                                           []Prescription Drug Management     []Decision for elective endoscopic procedure                RTC 6 months.      Danyell Peña MD  11/30/22  15:48 CST    Part of this note may be an electronic transcription/translation of spoken language to printed text.

## 2022-12-21 DIAGNOSIS — I34.1 MITRAL VALVE PROLAPSE: ICD-10-CM

## 2022-12-21 DIAGNOSIS — F90.9 ATTENTION DEFICIT HYPERACTIVITY DISORDER (ADHD), UNSPECIFIED ADHD TYPE: ICD-10-CM

## 2022-12-21 DIAGNOSIS — R00.2 PALPITATIONS: ICD-10-CM

## 2022-12-21 RX ORDER — DEXTROAMPHETAMINE SACCHARATE, AMPHETAMINE ASPARTATE MONOHYDRATE, DEXTROAMPHETAMINE SULFATE AND AMPHETAMINE SULFATE 5; 5; 5; 5 MG/1; MG/1; MG/1; MG/1
20 CAPSULE, EXTENDED RELEASE ORAL EVERY MORNING
Qty: 30 CAPSULE | Refills: 0 | Status: SHIPPED | OUTPATIENT
Start: 2022-12-21 | End: 2023-01-20 | Stop reason: SDUPTHER

## 2022-12-21 RX ORDER — ALPRAZOLAM 0.25 MG/1
0.25 TABLET ORAL DAILY PRN
Qty: 15 TABLET | Refills: 1 | Status: SHIPPED | OUTPATIENT
Start: 2022-12-21 | End: 2023-01-20 | Stop reason: SDUPTHER

## 2022-12-21 NOTE — TELEPHONE ENCOUNTER
Caller: Viviana Arzate    Relationship to patient: Self    Best call back number: [phone]    Patient is needing: pt said that she is completely out of of medication - she said that she is leaving to go out of town tomorrow morning and is needing this ASAP

## 2022-12-22 ENCOUNTER — PATIENT MESSAGE (OUTPATIENT)
Dept: FAMILY MEDICINE CLINIC | Facility: CLINIC | Age: 52
End: 2022-12-22
Payer: COMMERCIAL

## 2023-01-20 DIAGNOSIS — R00.2 PALPITATIONS: ICD-10-CM

## 2023-01-20 DIAGNOSIS — F90.9 ATTENTION DEFICIT HYPERACTIVITY DISORDER (ADHD), UNSPECIFIED ADHD TYPE: ICD-10-CM

## 2023-01-20 DIAGNOSIS — I34.1 MITRAL VALVE PROLAPSE: ICD-10-CM

## 2023-01-20 RX ORDER — ALPRAZOLAM 0.25 MG/1
0.25 TABLET ORAL DAILY PRN
Qty: 15 TABLET | Refills: 1 | Status: SHIPPED | OUTPATIENT
Start: 2023-01-20 | End: 2023-02-16 | Stop reason: SDUPTHER

## 2023-01-20 RX ORDER — DEXTROAMPHETAMINE SACCHARATE, AMPHETAMINE ASPARTATE MONOHYDRATE, DEXTROAMPHETAMINE SULFATE AND AMPHETAMINE SULFATE 5; 5; 5; 5 MG/1; MG/1; MG/1; MG/1
20 CAPSULE, EXTENDED RELEASE ORAL EVERY MORNING
Qty: 30 CAPSULE | Refills: 0 | Status: SHIPPED | OUTPATIENT
Start: 2023-01-20 | End: 2023-02-16 | Stop reason: SDUPTHER

## 2023-01-20 NOTE — TELEPHONE ENCOUNTER
From: ILIA Longo  To: Viviana Arzate  Sent: 12/22/2022 1:22 PM CST  Subject: dose increase    Hi Lorry    I wanted to let you know that Dr. Valdivia has sent in your refills to your pharmacy. Unfortunately, Dr. Hood is no longer with the [facility]. You may contact the office to schedule an appointment with Dr. Valdivia or Poly Linares.     -ILIA Artis

## 2023-01-20 NOTE — TELEPHONE ENCOUNTER
Rx Refill Note  Requested Prescriptions     Pending Prescriptions Disp Refills   • amphetamine-dextroamphetamine XR (Adderall XR) 20 MG 24 hr capsule 30 capsule 0     Sig: Take 1 capsule by mouth Every Morning For improved focus and attention   • ALPRAZolam (XANAX) 0.25 MG tablet 15 tablet 1     Sig: Take 1 tablet by mouth Daily As Needed for Anxiety.      Last office visit with prescribing clinician: Visit date not found   Last telemedicine visit with prescribing clinician: Visit date not found   Next office visit with prescribing clinician: 10/2/2023                         Would you like a call back once the refill request has been completed: [] Yes [] No    If the office needs to give you a call back, can they leave a voicemail: [] Yes [] No    Steff Begum, ILIA  01/20/23, 08:22 CST

## 2023-02-16 DIAGNOSIS — F90.9 ATTENTION DEFICIT HYPERACTIVITY DISORDER (ADHD), UNSPECIFIED ADHD TYPE: ICD-10-CM

## 2023-02-16 DIAGNOSIS — R00.2 PALPITATIONS: ICD-10-CM

## 2023-02-16 DIAGNOSIS — F41.9 ANXIETY: Primary | ICD-10-CM

## 2023-02-16 DIAGNOSIS — I34.1 MITRAL VALVE PROLAPSE: ICD-10-CM

## 2023-02-16 RX ORDER — ALPRAZOLAM 0.5 MG/1
0.5 TABLET ORAL 2 TIMES DAILY PRN
Qty: 20 TABLET | Refills: 0 | Status: SHIPPED | OUTPATIENT
Start: 2023-02-16 | End: 2023-03-02 | Stop reason: SDUPTHER

## 2023-02-16 RX ORDER — DEXTROAMPHETAMINE SACCHARATE, AMPHETAMINE ASPARTATE MONOHYDRATE, DEXTROAMPHETAMINE SULFATE AND AMPHETAMINE SULFATE 5; 5; 5; 5 MG/1; MG/1; MG/1; MG/1
20 CAPSULE, EXTENDED RELEASE ORAL EVERY MORNING
Qty: 30 CAPSULE | Refills: 0 | Status: SHIPPED | OUTPATIENT
Start: 2023-02-16

## 2023-02-16 RX ORDER — ALPRAZOLAM 0.25 MG/1
0.25 TABLET ORAL DAILY PRN
Qty: 20 TABLET | Refills: 1 | Status: SHIPPED | OUTPATIENT
Start: 2023-02-16 | End: 2023-02-16

## 2023-02-17 ENCOUNTER — TELEPHONE (OUTPATIENT)
Dept: FAMILY MEDICINE CLINIC | Facility: CLINIC | Age: 53
End: 2023-02-17

## 2023-02-17 NOTE — TELEPHONE ENCOUNTER
Spoke to Kayley at [facility] pharmacy and advised dose was increased to 0.5 and discontinue 0.25.

## 2023-02-17 NOTE — TELEPHONE ENCOUNTER
Caller: CHANNING QUINONEZ    Relationship: Other    Best call back number:  [phone]    What is the best time to reach you:  ANYTIME    Who are you requesting to speak with (clinical staff, provider,  specific staff member):  CLINICAL     What was the call regarding:  [facility] REQUESTS CALL BACK REGARDING .25 AND .50 STRENGTHS OF ALPRAZOLAM SINCE THEY HAVEN'T SEEN BOTH STRENGTHS BEFORE (VERIFY THAT PRESCRIBER INTENDED THIS)    Do you require a callback:  YES

## 2023-03-02 ENCOUNTER — OFFICE VISIT (OUTPATIENT)
Dept: FAMILY MEDICINE CLINIC | Facility: CLINIC | Age: 53
End: 2023-03-02
Payer: COMMERCIAL

## 2023-03-02 VITALS
SYSTOLIC BLOOD PRESSURE: 110 MMHG | TEMPERATURE: 97.3 F | DIASTOLIC BLOOD PRESSURE: 76 MMHG | OXYGEN SATURATION: 98 % | WEIGHT: 146.4 LBS | HEART RATE: 62 BPM | HEIGHT: 60 IN | BODY MASS INDEX: 28.74 KG/M2

## 2023-03-02 DIAGNOSIS — F32.A DEPRESSION, UNSPECIFIED DEPRESSION TYPE: ICD-10-CM

## 2023-03-02 DIAGNOSIS — G47.00 INSOMNIA, UNSPECIFIED TYPE: ICD-10-CM

## 2023-03-02 DIAGNOSIS — F41.9 ANXIETY: Primary | ICD-10-CM

## 2023-03-02 DIAGNOSIS — R45.86 MOOD SWINGS: ICD-10-CM

## 2023-03-02 DIAGNOSIS — F41.0 PANIC ATTACKS: ICD-10-CM

## 2023-03-02 DIAGNOSIS — Z51.81 MEDICATION MONITORING ENCOUNTER: ICD-10-CM

## 2023-03-02 PROCEDURE — 99215 OFFICE O/P EST HI 40 MIN: CPT | Performed by: NURSE PRACTITIONER

## 2023-03-02 RX ORDER — ALPRAZOLAM 0.5 MG/1
0.5 TABLET ORAL NIGHTLY
Qty: 30 TABLET | Refills: 0 | Status: SHIPPED | OUTPATIENT
Start: 2023-03-02 | End: 2023-04-01

## 2023-03-02 RX ORDER — OXCARBAZEPINE 150 MG/1
75 TABLET, FILM COATED ORAL NIGHTLY
Qty: 30 TABLET | Refills: 1 | Status: SHIPPED | OUTPATIENT
Start: 2023-03-02 | End: 2023-04-01

## 2023-03-02 NOTE — PATIENT INSTRUCTIONS
Begin trileptal 75 mg at bedtime     Please have fasting labs on the 1st floor of [facility], they are open Monday- Friday 7:00 am to 5:00 pm.    Discuss [employer] in one month.     Take docusate sodium with benefiber to help regulate bowel movements     Begin probiotic, [employer] of [employer], Women's one per day.

## 2023-03-02 NOTE — PROGRESS NOTES
Poly Garciatreet APRBAYLEE  [facility]   Family Medicine  [address]  [place] [address]  Phone: [phone]  Fax: [phone]         Chief Complaint:  Chief Complaint   Patient presents with   • Depression        History:  Viviana Arzate is a 52 y.o. female that is an established patient. She  is here for evaluation of the above complaint and management of chronic conditions.    HPI     Patient here to establish with a new care provider.  Previous patient of Dr. Hood.  Patient reports issues with anxiety, panic attacks, depression, insomnia.  She reports that she has had increased stress at work recently.  She states she had a meltdown at work 2 months ago and contacted Dr. Hood who prescribed Lamictal.  She states her taking Lamictal for 5 days she persistent nosebleed and a rash.  She states that she stopped taking the medication.  She reports taking Xanax 0.5 mg at bedtime and sometimes takes up to 1 g/day.  She states she has not prescribed this but received some from a friend.  She states that she also takes Effexor 150 mg daily and has for the last 10 years.  She states she has tried Lexapro in the past, Prozac, Celexa.  She states she is also prescribed Adderall Exar 20 mg for ADHD.  She states she has not taken her Adderall in the last 4 days and feels that she is doing okay without it.  She states most of her emotional issues began 2 years ago when she got a divorce.  Patient denies SI or HI.           ROS:  Review of Systems   Constitutional: Negative for fatigue, fever and unexpected weight change.   HENT: Negative for congestion, ear pain, rhinorrhea, sinus pressure, sinus pain and voice change.    Eyes: Negative for visual disturbance.   Respiratory: Negative for shortness of breath and wheezing.    Cardiovascular: Negative for chest pain and palpitations.   Gastrointestinal: Negative for abdominal pain, nausea and vomiting.   Genitourinary: Negative for dysuria and  "flank pain.   Musculoskeletal: Negative for back pain, myalgias and neck pain.   Skin: Negative for color change and rash.   Neurological: Negative for dizziness, weakness, numbness and headaches.   Psychiatric/Behavioral: Positive for decreased concentration and sleep disturbance. Negative for behavioral problems, dysphoric mood and self-injury. The patient is nervous/anxious.         reports that she has never smoked. She has never used smokeless tobacco. She reports current alcohol use of about 8.0 standard drinks per week. She reports that she does not use drugs.    Current Outpatient Medications   Medication Instructions   • acebutolol (SECTRAL) 200 mg, Oral, 2 Times Daily   • Allegra-D Allergy & Congestion 180-240 MG per 24 hr tablet 1 tablet, Oral, Daily, For inner ear symptoms and allergy control   • ALPRAZolam (XANAX) 0.5 mg, Oral, Nightly   • amphetamine-dextroamphetamine XR (Adderall XR) 20 MG 24 hr capsule 20 mg, Oral, Every Morning, For improved focus and attention   • cyanocobalamin 1,000 mcg, Subcutaneous, Every 14 Days   • fluticasone (Flonase) 50 MCG/ACT nasal spray 2 sprays, Nasal, Daily, To decrease swelling in nose and pressure in ears   • ipratropium (ATROVENT) 0.06 % nasal spray INHALE 2 SPRAYS IN EACH NOSTRIL THREE TIMES DAILY AS NEEDED FOR ALLERGY AND CONGESTION   • levonorgestrel (MIRENA) 20 MCG/24HR IUD 1 each, Intrauterine, Once   • OXcarbazepine (TRILEPTAL) 75 mg, Oral, Nightly   • Probiotic Product (PROBIOTIC ADVANCED PO) 1 capsule, Oral, Daily   • Syringe/Needle, Disp, (BD Integra Syringe) 25G X 1\" 3 ML misc USE WITH B12 SHOT   • valACYclovir (VALTREX) 500 mg, Oral, Daily   • venlafaxine XR (EFFEXOR-XR) 150 mg, Oral, Daily, For mood   • vitamin D (ERGOCALCIFEROL) 50,000 Units, Oral, Weekly       OBJECTIVE:  /76 (BP Location: Left arm, Patient Position: Sitting, Cuff Size: Adult)   Pulse 62   Temp 97.3 °F (36.3 °C) (Temporal)   Ht 152.4 cm (60\")   Wt 66.4 kg (146 lb 6.4 oz)   " SpO2 98%   BMI 28.59 kg/m²    Physical Exam  Vitals and nursing note reviewed.   Constitutional:       Appearance: Normal appearance. She is well-developed.   HENT:      Head: Normocephalic and atraumatic.      Right Ear: Tympanic membrane, ear canal and external ear normal.      Left Ear: Tympanic membrane, ear canal and external ear normal.      Nose: Nose normal. No septal deviation, nasal tenderness or congestion.      Mouth/Throat:      Lips: Pink. No lesions.      Mouth: Mucous membranes are moist. No oral lesions.      Dentition: Normal dentition.      Pharynx: Oropharynx is clear. No pharyngeal swelling, oropharyngeal exudate or posterior oropharyngeal erythema.   Eyes:      General: Lids are normal. Vision grossly intact. No scleral icterus.        Right eye: No discharge.         Left eye: No discharge.      Extraocular Movements: Extraocular movements intact.      Conjunctiva/sclera: Conjunctivae normal.      Right eye: Right conjunctiva is not injected.      Left eye: Left conjunctiva is not injected.      Pupils: Pupils are equal, round, and reactive to light.   Neck:      Thyroid: No thyroid mass.      Trachea: Trachea normal.   Cardiovascular:      Rate and Rhythm: Normal rate and regular rhythm.      Heart sounds: Normal heart sounds. No murmur heard.    No gallop.   Pulmonary:      Effort: Pulmonary effort is normal.      Breath sounds: Normal breath sounds and air entry. No wheezing, rhonchi or rales.   Musculoskeletal:         General: No tenderness or deformity. Normal range of motion.      Cervical back: Full passive range of motion without pain, normal range of motion and neck supple.      Thoracic back: Normal.      Right lower leg: No edema.      Left lower leg: No edema.   Skin:     General: Skin is warm and dry.      Coloration: Skin is not jaundiced.      Findings: No rash.   Neurological:      Mental Status: She is alert and oriented to person, place, and time.      Sensory: Sensation is  intact.      Motor: Motor function is intact.      Coordination: Coordination is intact.      Gait: Gait is intact.      Deep Tendon Reflexes: Reflexes are normal and symmetric.   Psychiatric:         Mood and Affect: Mood and affect normal.         Behavior: Behavior normal.         Judgment: Judgment normal.         BMI is >= 25 and <30. (Overweight) The following options were offered after discussion;: nutrition counseling/recommendations    Procedures    Assessment/Plan:     Diagnoses and all orders for this visit:    1. Anxiety (Primary)  -     CBC Auto Differential; Future  -     Comprehensive Metabolic Panel; Future  -     Lipid Panel; Future  -     TSH; Future  -     Urinalysis With Culture If Indicated -; Future  -     T4, Free; Future  -     ALPRAZolam (Xanax) 0.5 MG tablet; Take 1 tablet by mouth Every Night for 30 days.  Dispense: 30 tablet; Refill: 0    2. Mood swings  -     OXcarbazepine (Trileptal) 150 MG tablet; Take 0.5 tablets by mouth Every Night for 30 days.  Dispense: 30 tablet; Refill: 1    3. Depression, unspecified depression type    4. Insomnia, unspecified type    5. Medication monitoring encounter  -     Urine Drug Screen - Urine, Clean Catch; Future    6. Panic attacks  -     ALPRAZolam (Xanax) 0.5 MG tablet; Take 1 tablet by mouth Every Night for 30 days.  Dispense: 30 tablet; Refill: 0        An After Visit Summary was printed and given to the patient at discharge.  No follow-ups on file.          Discussion: Discussion with the patient occurred regarding stimulant medications and benzodiazepines.  We did discuss the concern regarding taking stimulant medications the increased anxiety associated and the concern in taking a benzodiazepine to treat anxiety.  We discussed the long-term adverse effects of benzodiazepines dementia.  She reported using benzodiazepines greater than 5 years.  Plan to perform GeneSight testing to determine the best medication for patient.  We discussed adding on  Trileptal 75 mg p.o. nightly to help with mood stabilization.  Discussed with patient that goal of SS RI/SNRI therapy is for anxiety to be well controlled and the use of the benzodiazepine is not needed.  We also discussed cessation of the ADHD medicine due to the adverse effect on anxiety.  She was in agreement with this plan and voiced understanding.  I spent 60 minutes caring for Viviana on this date of service. This time includes time spent by me in the following activities: preparing for the visit, reviewing tests, obtaining and/or reviewing a separately obtained history, performing a medically appropriate examination and/or evaluation, counseling and educating the patient/family/caregiver, ordering medications, tests, or procedures and documenting information in the medical record     Poly CONNOR 3/2/2023   Electronically signed.

## 2023-03-21 RX ORDER — ACEBUTOLOL HYDROCHLORIDE 200 MG/1
CAPSULE ORAL
Qty: 180 CAPSULE | Refills: 3 | Status: SHIPPED | OUTPATIENT
Start: 2023-03-21

## 2023-03-24 ENCOUNTER — LAB (OUTPATIENT)
Dept: LAB | Facility: HOSPITAL | Age: 53
End: 2023-03-24
Payer: COMMERCIAL

## 2023-03-24 DIAGNOSIS — F41.9 ANXIETY: ICD-10-CM

## 2023-03-24 DIAGNOSIS — Z51.81 MEDICATION MONITORING ENCOUNTER: ICD-10-CM

## 2023-03-24 LAB
ALBUMIN SERPL-MCNC: 4.7 G/DL (ref 3.5–5.2)
ALBUMIN/GLOB SERPL: 1.7 G/DL
ALP SERPL-CCNC: 70 U/L (ref 39–117)
ALT SERPL W P-5'-P-CCNC: 17 U/L (ref 1–33)
AMPHET+METHAMPHET UR QL: NEGATIVE
AMPHETAMINES UR QL: NEGATIVE
ANION GAP SERPL CALCULATED.3IONS-SCNC: 9.4 MMOL/L (ref 5–15)
AST SERPL-CCNC: 32 U/L (ref 1–32)
BACTERIA UR QL AUTO: ABNORMAL /HPF
BARBITURATES UR QL SCN: NEGATIVE
BASOPHILS # BLD AUTO: 0.05 10*3/MM3 (ref 0–0.2)
BASOPHILS NFR BLD AUTO: 1 % (ref 0–1.5)
BENZODIAZ UR QL SCN: POSITIVE
BILIRUB SERPL-MCNC: 0.5 MG/DL (ref 0–1.2)
BILIRUB UR QL STRIP: NEGATIVE
BUN SERPL-MCNC: 11 MG/DL (ref 6–20)
BUN/CREAT SERPL: 9.3 (ref 7–25)
BUPRENORPHINE SERPL-MCNC: NEGATIVE NG/ML
CALCIUM SPEC-SCNC: 9.8 MG/DL (ref 8.6–10.5)
CANNABINOIDS SERPL QL: NEGATIVE
CHLORIDE SERPL-SCNC: 102 MMOL/L (ref 98–107)
CHOLEST SERPL-MCNC: 217 MG/DL (ref 0–200)
CLARITY UR: ABNORMAL
CO2 SERPL-SCNC: 29.6 MMOL/L (ref 22–29)
COCAINE UR QL: NEGATIVE
COLOR UR: YELLOW
CREAT SERPL-MCNC: 1.18 MG/DL (ref 0.57–1)
DEPRECATED RDW RBC AUTO: 40.4 FL (ref 37–54)
EGFRCR SERPLBLD CKD-EPI 2021: 56.4 ML/MIN/1.73
EOSINOPHIL # BLD AUTO: 0.18 10*3/MM3 (ref 0–0.4)
EOSINOPHIL NFR BLD AUTO: 3.5 % (ref 0.3–6.2)
ERYTHROCYTE [DISTWIDTH] IN BLOOD BY AUTOMATED COUNT: 11.6 % (ref 12.3–15.4)
GLOBULIN UR ELPH-MCNC: 2.8 GM/DL
GLUCOSE SERPL-MCNC: 114 MG/DL (ref 65–99)
GLUCOSE UR STRIP-MCNC: NEGATIVE MG/DL
HCT VFR BLD AUTO: 40.7 % (ref 34–46.6)
HDLC SERPL-MCNC: 45 MG/DL (ref 40–60)
HGB BLD-MCNC: 13.8 G/DL (ref 12–15.9)
HGB UR QL STRIP.AUTO: NEGATIVE
HYALINE CASTS UR QL AUTO: ABNORMAL /LPF
IMM GRANULOCYTES # BLD AUTO: 0.01 10*3/MM3 (ref 0–0.05)
IMM GRANULOCYTES NFR BLD AUTO: 0.2 % (ref 0–0.5)
KETONES UR QL STRIP: NEGATIVE
LDLC SERPL CALC-MCNC: 133 MG/DL (ref 0–100)
LDLC/HDLC SERPL: 2.84 {RATIO}
LEUKOCYTE ESTERASE UR QL STRIP.AUTO: ABNORMAL
LYMPHOCYTES # BLD AUTO: 1.84 10*3/MM3 (ref 0.7–3.1)
LYMPHOCYTES NFR BLD AUTO: 35.5 % (ref 19.6–45.3)
MCH RBC QN AUTO: 32 PG (ref 26.6–33)
MCHC RBC AUTO-ENTMCNC: 33.9 G/DL (ref 31.5–35.7)
MCV RBC AUTO: 94.4 FL (ref 79–97)
METHADONE UR QL SCN: NEGATIVE
MONOCYTES # BLD AUTO: 0.45 10*3/MM3 (ref 0.1–0.9)
MONOCYTES NFR BLD AUTO: 8.7 % (ref 5–12)
NEUTROPHILS NFR BLD AUTO: 2.66 10*3/MM3 (ref 1.7–7)
NEUTROPHILS NFR BLD AUTO: 51.1 % (ref 42.7–76)
NITRITE UR QL STRIP: NEGATIVE
NRBC BLD AUTO-RTO: 0 /100 WBC (ref 0–0.2)
OPIATES UR QL: NEGATIVE
OXYCODONE UR QL SCN: NEGATIVE
PCP UR QL SCN: NEGATIVE
PH UR STRIP.AUTO: 6 [PH] (ref 5–8)
PLATELET # BLD AUTO: 223 10*3/MM3 (ref 140–450)
PMV BLD AUTO: 11.2 FL (ref 6–12)
POTASSIUM SERPL-SCNC: 4.2 MMOL/L (ref 3.5–5.2)
PROPOXYPH UR QL: NEGATIVE
PROT SERPL-MCNC: 7.5 G/DL (ref 6–8.5)
PROT UR QL STRIP: ABNORMAL
RBC # BLD AUTO: 4.31 10*6/MM3 (ref 3.77–5.28)
RBC # UR STRIP: ABNORMAL /HPF
REF LAB TEST METHOD: ABNORMAL
SODIUM SERPL-SCNC: 141 MMOL/L (ref 136–145)
SP GR UR STRIP: >=1.03 (ref 1–1.03)
SQUAMOUS #/AREA URNS HPF: ABNORMAL /HPF
T4 FREE SERPL-MCNC: 1.12 NG/DL (ref 0.93–1.7)
TRICYCLICS UR QL SCN: NEGATIVE
TRIGL SERPL-MCNC: 221 MG/DL (ref 0–150)
TSH SERPL DL<=0.05 MIU/L-ACNC: 1.52 UIU/ML (ref 0.27–4.2)
UROBILINOGEN UR QL STRIP: ABNORMAL
VLDLC SERPL-MCNC: 39 MG/DL (ref 5–40)
WBC # UR STRIP: ABNORMAL /HPF
WBC NRBC COR # BLD: 5.19 10*3/MM3 (ref 3.4–10.8)

## 2023-03-24 PROCEDURE — 36415 COLL VENOUS BLD VENIPUNCTURE: CPT

## 2023-03-24 PROCEDURE — 80050 GENERAL HEALTH PANEL: CPT

## 2023-03-24 PROCEDURE — 80306 DRUG TEST PRSMV INSTRMNT: CPT

## 2023-03-24 PROCEDURE — 80061 LIPID PANEL: CPT

## 2023-03-24 PROCEDURE — 84439 ASSAY OF FREE THYROXINE: CPT

## 2023-03-24 PROCEDURE — 81001 URINALYSIS AUTO W/SCOPE: CPT

## 2023-03-24 NOTE — PROGRESS NOTES
Please call the patient regarding abnormal result. Pt needs to begin statin medication. , triglycerides elevated at 221.  If she is concerned about side effects of statins she can begin coq10 200mg at bedtime 7 nights before starting statin medication.  She will need to continue taking COQ10 200mg at bedtime while she is on the statin.  If she is willing to start statin please let me know.

## 2023-03-28 RX ORDER — AMPICILLIN TRIHYDRATE 250 MG
200 CAPSULE ORAL NIGHTLY
Qty: 30 CAPSULE | Refills: 2 | Status: SHIPPED | OUTPATIENT
Start: 2023-03-28 | End: 2023-04-27

## 2023-03-28 RX ORDER — PRAVASTATIN SODIUM 20 MG
20 TABLET ORAL DAILY
Qty: 90 TABLET | Refills: 0 | Status: SHIPPED | OUTPATIENT
Start: 2023-04-04 | End: 2023-07-03

## 2023-03-28 NOTE — PROGRESS NOTES
Looks like pt urine was contaminated due to squamous epithelial cells being in specimen.  Nothing further needed.

## 2023-03-29 NOTE — PROGRESS NOTES
I called and spoke with patient to let her know the results of urine culture. Patient voiced understanding. She states that she is starting COQ 10 and then she will start pravastatin

## 2023-04-13 ENCOUNTER — OFFICE VISIT (OUTPATIENT)
Dept: FAMILY MEDICINE CLINIC | Facility: CLINIC | Age: 53
End: 2023-04-13
Payer: COMMERCIAL

## 2023-04-13 VITALS
OXYGEN SATURATION: 100 % | WEIGHT: 146 LBS | DIASTOLIC BLOOD PRESSURE: 70 MMHG | SYSTOLIC BLOOD PRESSURE: 110 MMHG | TEMPERATURE: 97.8 F | HEART RATE: 72 BPM | RESPIRATION RATE: 18 BRPM | HEIGHT: 60 IN | BODY MASS INDEX: 28.66 KG/M2

## 2023-04-13 DIAGNOSIS — G47.00 INSOMNIA, UNSPECIFIED TYPE: ICD-10-CM

## 2023-04-13 DIAGNOSIS — F41.9 ANXIETY: ICD-10-CM

## 2023-04-13 DIAGNOSIS — R53.83 OTHER FATIGUE: ICD-10-CM

## 2023-04-13 DIAGNOSIS — F32.A DEPRESSION, UNSPECIFIED DEPRESSION TYPE: Primary | ICD-10-CM

## 2023-04-13 DIAGNOSIS — R45.86 MOOD SWINGS: ICD-10-CM

## 2023-04-13 RX ORDER — ALPRAZOLAM 0.25 MG/1
0.5 TABLET ORAL DAILY PRN
COMMUNITY
Start: 2023-03-20

## 2023-04-13 RX ORDER — VENLAFAXINE 75 MG/1
TABLET ORAL
Qty: 90 TABLET | Refills: 2 | Status: SHIPPED | OUTPATIENT
Start: 2023-04-13

## 2023-04-13 NOTE — PROGRESS NOTES
NIKOLAS Adair  [facility]   Family Medicine  [address]  [place] [address]  Phone: [phone]  Fax: [phone]         Chief Complaint:  Chief Complaint   Patient presents with   • Follow-up     1-month          History:  Viviana Arzate is a 52 y.o. female that is an established patient. She  is here for evaluation of the above complaint and management of chronic conditions.    HPI     4/13/2023: Pt here to follow up since medication changes. Pt reports that she was unable to tolerate the trileptal. She states that she took the medication for 4 nights and had palpitations every night. She states that she found an old effexor 75mg. She states that she took this along with her 150mg dose and felt much better. Pt has been off of adderall for over one month. Pt reports that she is still taking xanax at bedtime for insomnia. She notes that she has to have this medication to sleep. Pt is also here to discuss genesite testing.       3/02/2023: Patient here to establish with a new care provider.  Previous patient of Dr. Hood.  Patient reports issues with anxiety, panic attacks, depression, insomnia.  She reports that she has had increased stress at work recently.  She states she had a meltdown at work 2 months ago and contacted Dr. Hood who prescribed Lamictal.  She states her taking Lamictal for 5 days she persistent nosebleed and a rash.  She states that she stopped taking the medication.  She reports taking Xanax 0.5 mg at bedtime and sometimes takes up to 1 g/day.  She states she has not prescribed this but received some from a friend.  She states that she also takes Effexor 150 mg daily and has for the last 10 years.  She states she has tried Lexapro in the past, Prozac, Celexa.  She states she is also prescribed Adderall Exar 20 mg for ADHD.  She states she has not taken her Adderall in the last 4 days and feels that she is doing okay without it.  She states most of  her emotional issues began 2 years ago when she got a divorce.  Patient denies SI or HI.         ROS:  Review of Systems   Constitutional: Negative for fatigue, fever and unexpected weight change.   HENT: Negative for congestion, ear pain, rhinorrhea, sinus pressure, sinus pain and voice change.    Eyes: Negative for visual disturbance.   Respiratory: Negative for shortness of breath and wheezing.    Cardiovascular: Negative for chest pain and palpitations.   Gastrointestinal: Negative for abdominal pain, nausea and vomiting.   Genitourinary: Negative for dysuria and flank pain.   Musculoskeletal: Negative for back pain, myalgias and neck pain.   Skin: Negative for color change and rash.   Neurological: Negative for dizziness, weakness, numbness and headaches.   Psychiatric/Behavioral: Positive for decreased concentration and sleep disturbance. Negative for behavioral problems, dysphoric mood and self-injury. The patient is nervous/anxious.         reports that she has never smoked. She has never been exposed to tobacco smoke. She has never used smokeless tobacco. She reports current alcohol use of about 8.0 standard drinks per week. She reports that she does not use drugs.    Current Outpatient Medications   Medication Instructions   • acebutolol (SECTRAL) 200 mg, Oral, 2 Times Daily   • Allegra-D Allergy & Congestion 180-240 MG per 24 hr tablet 1 tablet, Oral, Daily, For inner ear symptoms and allergy control   • ALPRAZolam (XANAX) 0.5 mg, Oral, Daily PRN, for anxiety 0.5 MG   • ALPRAZolam (XANAX) 0.5 mg, Oral, Nightly   • amphetamine-dextroamphetamine XR (Adderall XR) 20 MG 24 hr capsule 20 mg, Oral, Every Morning, For improved focus and attention   • CoQ10 200 mg, Oral, Nightly   • cyanocobalamin 1,000 mcg, Subcutaneous, Every 14 Days   • fluticasone (Flonase) 50 MCG/ACT nasal spray 2 sprays, Nasal, Daily, To decrease swelling in nose and pressure in ears   • ipratropium (ATROVENT) 0.06 % nasal spray INHALE 2  "SPRAYS IN EACH NOSTRIL THREE TIMES DAILY AS NEEDED FOR ALLERGY AND CONGESTION   • levonorgestrel (MIRENA) 20 MCG/24HR IUD 1 each, Intrauterine, Once   • pravastatin (PRAVACHOL) 20 mg, Oral, Daily, Start one week after beginning COQ10.   • Probiotic Product (PROBIOTIC ADVANCED PO) 1 capsule, Oral, Daily   • Syringe/Needle, Disp, (BD Integra Syringe) 25G X 1\" 3 ML misc USE WITH B12 SHOT   • valACYclovir (VALTREX) 500 mg, Oral, Daily   • venlafaxine (EFFEXOR) 75 MG tablet 3 tablets daily   • vitamin D (ERGOCALCIFEROL) 50,000 Units, Oral, Weekly       OBJECTIVE:  /70 (BP Location: Right arm, Patient Position: Sitting, Cuff Size: Adult)   Pulse 72   Temp 97.8 °F (36.6 °C) (Infrared)   Resp 18   Ht 152.4 cm (60\")   Wt 66.2 kg (146 lb)   SpO2 100%   BMI 28.51 kg/m²    Physical Exam  Vitals and nursing note reviewed.   Constitutional:       Appearance: Normal appearance. She is well-developed.   HENT:      Head: Normocephalic and atraumatic.      Right Ear: Tympanic membrane, ear canal and external ear normal.      Left Ear: Tympanic membrane, ear canal and external ear normal.      Nose: Nose normal. No septal deviation, nasal tenderness or congestion.      Mouth/Throat:      Lips: Pink. No lesions.      Mouth: Mucous membranes are moist. No oral lesions.      Dentition: Normal dentition.      Pharynx: Oropharynx is clear. No pharyngeal swelling, oropharyngeal exudate or posterior oropharyngeal erythema.   Eyes:      General: Lids are normal. Vision grossly intact. No scleral icterus.        Right eye: No discharge.         Left eye: No discharge.      Extraocular Movements: Extraocular movements intact.      Conjunctiva/sclera: Conjunctivae normal.      Right eye: Right conjunctiva is not injected.      Left eye: Left conjunctiva is not injected.      Pupils: Pupils are equal, round, and reactive to light.   Neck:      Thyroid: No thyroid mass.      Trachea: Trachea normal.   Cardiovascular:      Rate and " Rhythm: Normal rate and regular rhythm.      Heart sounds: Normal heart sounds. No murmur heard.    No gallop.   Pulmonary:      Effort: Pulmonary effort is normal.      Breath sounds: Normal breath sounds and air entry. No wheezing, rhonchi or rales.   Musculoskeletal:         General: No tenderness or deformity. Normal range of motion.      Cervical back: Full passive range of motion without pain, normal range of motion and neck supple.      Thoracic back: Normal.      Right lower leg: No edema.      Left lower leg: No edema.   Skin:     General: Skin is warm and dry.      Coloration: Skin is not jaundiced.      Findings: No rash.   Neurological:      Mental Status: She is alert and oriented to person, place, and time.      Sensory: Sensation is intact.      Motor: Motor function is intact.      Coordination: Coordination is intact.      Gait: Gait is intact.      Deep Tendon Reflexes: Reflexes are normal and symmetric.   Psychiatric:         Mood and Affect: Mood and affect normal.         Behavior: Behavior normal.         Judgment: Judgment normal.         BMI is >= 25 and <30. (Overweight) The following options were offered after discussion;: nutrition counseling/recommendations    Procedures    Assessment/Plan:     Diagnoses and all orders for this visit:    1. Depression, unspecified depression type (Primary)  -     venlafaxine (EFFEXOR) 75 MG tablet; 3 tablets daily  Dispense: 90 tablet; Refill: 2    2. Anxiety  -     venlafaxine (EFFEXOR) 75 MG tablet; 3 tablets daily  Dispense: 90 tablet; Refill: 2    3. Mood swings  -     venlafaxine (EFFEXOR) 75 MG tablet; 3 tablets daily  Dispense: 90 tablet; Refill: 2    4. Insomnia, unspecified type  -     venlafaxine (EFFEXOR) 75 MG tablet; 3 tablets daily  Dispense: 90 tablet; Refill: 2    5. Other fatigue        An After Visit Summary was printed and given to the patient at discharge.  Return in about 3 months (around 7/13/2023).          Discussion: 30 min of  time was spent preparing for the visit, reviewing tests, obtaining and/or reviewing a separately obtained history, performing a medically appropriate examination and/or evaluation, counseling and educating the patient/family/caregiver, ordering medications, tests, or procedures and documenting information in the medical record     Poly CONNOR 4/15/2023   Electronically signed.

## 2023-04-14 DIAGNOSIS — F41.0 PANIC ATTACKS: ICD-10-CM

## 2023-04-14 DIAGNOSIS — F41.9 ANXIETY: ICD-10-CM

## 2023-04-14 RX ORDER — ALPRAZOLAM 0.5 MG/1
0.5 TABLET ORAL NIGHTLY
Qty: 30 TABLET | Refills: 0 | Status: SHIPPED | OUTPATIENT
Start: 2023-04-14 | End: 2023-05-14

## 2023-04-14 NOTE — TELEPHONE ENCOUNTER
Rx Refill Note  Requested Prescriptions     Pending Prescriptions Disp Refills   • ALPRAZolam (Xanax) 0.5 MG tablet 30 tablet 0     Sig: Take 1 tablet by mouth Every Night for 30 days.      Last office visit with prescribing clinician: 4/13/2023   Last telemedicine visit with prescribing clinician: 7/13/2023   Next office visit with prescribing clinician: 7/13/2023                         Would you like a call back once the refill request has been completed: [] Yes [] No    If the office needs to give you a call back, can they leave a voicemail: [] Yes [] No    Ceci Rosa MA  04/14/23, 15:15 CDT

## 2023-05-15 DIAGNOSIS — R45.86 MOOD SWINGS: ICD-10-CM

## 2023-05-15 DIAGNOSIS — F32.A DEPRESSION, UNSPECIFIED DEPRESSION TYPE: ICD-10-CM

## 2023-05-15 DIAGNOSIS — F41.9 ANXIETY: ICD-10-CM

## 2023-05-15 DIAGNOSIS — R07.9 CHEST PAIN, UNSPECIFIED TYPE: Primary | ICD-10-CM

## 2023-05-15 DIAGNOSIS — G47.00 INSOMNIA, UNSPECIFIED TYPE: ICD-10-CM

## 2023-05-15 DIAGNOSIS — F41.0 PANIC ATTACKS: ICD-10-CM

## 2023-05-15 DIAGNOSIS — R00.2 PALPITATIONS: ICD-10-CM

## 2023-05-15 RX ORDER — UBIDECARENONE 75 MG
200 CAPSULE ORAL DAILY
Qty: 30 EACH | Refills: 2 | Status: SHIPPED | OUTPATIENT
Start: 2023-05-15

## 2023-05-15 RX ORDER — VENLAFAXINE 75 MG/1
TABLET ORAL
Qty: 90 TABLET | Refills: 2 | Status: SHIPPED | OUTPATIENT
Start: 2023-05-15

## 2023-05-15 RX ORDER — ALPRAZOLAM 0.5 MG/1
0.5 TABLET ORAL NIGHTLY
Qty: 30 TABLET | Refills: 0 | Status: SHIPPED | OUTPATIENT
Start: 2023-05-15 | End: 2023-06-14

## 2023-05-15 RX ORDER — UBIDECARENONE 75 MG
CAPSULE ORAL
COMMUNITY
Start: 2023-05-06 | End: 2023-05-15 | Stop reason: SDUPTHER

## 2023-05-15 RX ORDER — ACEBUTOLOL HYDROCHLORIDE 200 MG/1
200 CAPSULE ORAL DAILY
Qty: 30 CAPSULE | Refills: 2 | Status: SHIPPED | OUTPATIENT
Start: 2023-05-15

## 2023-05-15 NOTE — TELEPHONE ENCOUNTER
Rx Refill Note  Requested Prescriptions     Pending Prescriptions Disp Refills   • ALPRAZolam (Xanax) 0.5 MG tablet 30 tablet 0     Sig: Take 1 tablet by mouth Every Night for 30 days.      Last office visit with prescribing clinician: 4/13/2023   Last telemedicine visit with prescribing clinician: 5/13/2023   Next office visit with prescribing clinician: 7/13/2023                         Would you like a call back once the refill request has been completed: [] Yes [] No    If the office needs to give you a call back, can they leave a voicemail: [] Yes [] No    Ceci Rosa MA  05/15/23, 08:17 CDT

## 2023-05-15 NOTE — TELEPHONE ENCOUNTER
Rx Refill Note  Requested Prescriptions     Pending Prescriptions Disp Refills   • ALPRAZolam (Xanax) 0.5 MG tablet 30 tablet 0     Sig: Take 1 tablet by mouth Every Night for 30 days.   • Coenzyme Q10 (Co Q-10) 200 MG capsule 30 each 2     Sig: Take 200 mg by mouth Daily.   • venlafaxine (EFFEXOR) 75 MG tablet 90 tablet 2     Sig: 3 tablets daily   • acebutolol (SECTRAL) 200 MG capsule 30 capsule 2     Sig: Take 1 capsule by mouth Daily.      Last office visit with prescribing clinician: 4/13/2023   Last telemedicine visit with prescribing clinician: 5/13/2023   Next office visit with prescribing clinician: 7/13/2023                         Would you like a call back once the refill request has been completed: [] Yes [] No    If the office needs to give you a call back, can they leave a voicemail: [] Yes [] No    Ceci Rosa MA  05/15/23, 08:27 CDT

## 2023-05-23 DIAGNOSIS — I34.1 MITRAL VALVE PROLAPSE: Primary | ICD-10-CM

## 2023-05-23 RX ORDER — PRAVASTATIN SODIUM 20 MG
20 TABLET ORAL DAILY
Qty: 90 TABLET | Refills: 0 | Status: SHIPPED | OUTPATIENT
Start: 2023-05-23 | End: 2023-08-21

## 2023-05-30 DIAGNOSIS — E53.8 VITAMIN B 12 DEFICIENCY: Primary | ICD-10-CM

## 2023-05-30 RX ORDER — CYANOCOBALAMIN 1000 UG/ML
1000 INJECTION, SOLUTION INTRAMUSCULAR; SUBCUTANEOUS
Qty: 1 ML | Refills: 4 | Status: CANCELLED | OUTPATIENT
Start: 2023-05-30

## 2023-05-30 NOTE — TELEPHONE ENCOUNTER
Rx Refill Note  Requested Prescriptions     Pending Prescriptions Disp Refills    cyanocobalamin 1000 MCG/ML injection 1 mL 4     Sig: Inject 1 mL under the skin into the appropriate area as directed Every 14 (Fourteen) Days.      Last office visit with prescribing clinician: 4/13/2023   Last telemedicine visit with prescribing clinician: Visit date not found   Next office visit with prescribing clinician: 7/13/2023                         Would you like a call back once the refill request has been completed: [] Yes [] No    If the office needs to give you a call back, can they leave a voicemail: [] Yes [] No    Ceci Rosa MA  05/30/23, 08:51 CDT

## 2023-06-07 NOTE — TELEPHONE ENCOUNTER
Patient notified sent her a message through the computer   Isotretinoin Counseling: Patient should get monthly blood tests, not donate blood, not drive at night if vision affected, not share medication, and not undergo elective surgery for 6 months after tx completed. Side effects reviewed, pt to contact office should one occur.

## 2023-06-12 RX ORDER — OXCARBAZEPINE 150 MG/1
150 TABLET, FILM COATED ORAL 2 TIMES DAILY
Qty: 30 TABLET | Refills: 1 | Status: CANCELLED | OUTPATIENT
Start: 2023-06-12

## 2023-07-21 DIAGNOSIS — E53.8 VITAMIN B 12 DEFICIENCY: Primary | ICD-10-CM

## 2023-07-21 NOTE — TELEPHONE ENCOUNTER
Rx Refill Note  Requested Prescriptions     Pending Prescriptions Disp Refills    Cyanocobalamin 1000 MCG/ML kit 1 kit      Sig: Inject  as directed.      Last office visit with prescribing clinician: 4/13/2023   Last telemedicine visit with prescribing clinician: Visit date not found   Next office visit with prescribing clinician: 10/17/2023                         Would you like a call back once the refill request has been completed: [] Yes [] No    If the office needs to give you a call back, can they leave a voicemail: [] Yes [] No    Ceci Rosa MA  07/21/23, 15:58 CDT

## 2023-07-24 ENCOUNTER — TELEPHONE (OUTPATIENT)
Dept: FAMILY MEDICINE CLINIC | Facility: CLINIC | Age: 53
End: 2023-07-24
Payer: COMMERCIAL

## 2023-08-23 ENCOUNTER — OFFICE VISIT (OUTPATIENT)
Dept: FAMILY MEDICINE CLINIC | Facility: CLINIC | Age: 53
End: 2023-08-23
Payer: COMMERCIAL

## 2023-08-23 VITALS
HEIGHT: 60 IN | DIASTOLIC BLOOD PRESSURE: 90 MMHG | SYSTOLIC BLOOD PRESSURE: 123 MMHG | WEIGHT: 155.5 LBS | TEMPERATURE: 98 F | BODY MASS INDEX: 30.53 KG/M2 | OXYGEN SATURATION: 97 % | RESPIRATION RATE: 18 BRPM | HEART RATE: 67 BPM

## 2023-08-23 DIAGNOSIS — E78.5 HYPERLIPIDEMIA, UNSPECIFIED HYPERLIPIDEMIA TYPE: ICD-10-CM

## 2023-08-23 DIAGNOSIS — E55.9 VITAMIN D DEFICIENCY: ICD-10-CM

## 2023-08-23 DIAGNOSIS — F41.9 ANXIETY: ICD-10-CM

## 2023-08-23 DIAGNOSIS — K62.3 RECTAL PROLAPSE: ICD-10-CM

## 2023-08-23 DIAGNOSIS — K58.1 IRRITABLE BOWEL SYNDROME WITH CONSTIPATION: ICD-10-CM

## 2023-08-23 DIAGNOSIS — K62.2: Primary | ICD-10-CM

## 2023-08-23 DIAGNOSIS — E53.8 VITAMIN B 12 DEFICIENCY: ICD-10-CM

## 2023-08-23 PROCEDURE — 99214 OFFICE O/P EST MOD 30 MIN: CPT | Performed by: NURSE PRACTITIONER

## 2023-08-23 RX ORDER — ALPRAZOLAM 0.5 MG/1
0.5 TABLET ORAL 2 TIMES DAILY PRN
Qty: 30 TABLET | Refills: 2 | Status: SHIPPED | OUTPATIENT
Start: 2023-08-23

## 2023-08-23 RX ORDER — IBUPROFEN 200 MG
1 TABLET ORAL EVERY 6 HOURS PRN
COMMUNITY

## 2023-08-23 NOTE — PROGRESS NOTES
NIKOLAS Adair  [facility]   Family Medicine  [address]  [place] [address]  Phone: [phone]  Fax: [phone]         Chief Complaint:  Chief Complaint   Patient presents with    Gastro Issues         History:  Viviana Arzate is a 52 y.o. female that is an established patient. She  is here for evaluation of the above complaint and management of chronic conditions.      HPI     8/23/2023: complains of rectal discomfort for years but June 22, 2022 noted to have rectal issue every 3 months. States that she started experiencing another flare of symptoms. Urgency of defication, but unable to have BM.  Pt states that she feels something is hanging out of her rectum. Has a picture of this today. States that she has had a bowel movements since this started that is like soft serve ice cream.  Since June 2022 she has Bms that consit of small round balls, normal bms, every 3 days. Recent colonoscopy per Dr. Peña June 2022, was told everything was WNL. Pt states that when she strains to go to the bathroom she has also had to digitally manneuver to aid in Bms. She also reports having some mucus like discharge from her rectum at times.   4/13/2023: Pt here to follow up since medication changes. Pt reports that she was unable to tolerate the trileptal. She states that she took the medication for 4 nights and had palpitations every night. She states that she found an old effexor 75mg. She states that she took this along with her 150mg dose and felt much better. Pt has been off of adderall for over one month. Pt reports that she is still taking xanax at bedtime for insomnia. She notes that she has to have this medication to sleep. Pt is also here to discuss genesite testing.   3/02/2023: Patient here to establish with a new care provider.  Previous patient of Dr. Hood.  Patient reports issues with anxiety, panic attacks, depression, insomnia.  She reports that she has had increased  stress at work recently.  She states she had a meltdown at work 2 months ago and contacted Dr. Hood who prescribed Lamictal.  She states her taking Lamictal for 5 days she persistent nosebleed and a rash.  She states that she stopped taking the medication.  She reports taking Xanax 0.5 mg at bedtime and sometimes takes up to 1 g/day.  She states she has not prescribed this but received some from a friend.  She states that she also takes Effexor 150 mg daily and has for the last 10 years.  She states she has tried Lexapro in the past, Prozac, Celexa.  She states she is also prescribed Adderall Exar 20 mg for ADHD.  She states she has not taken her Adderall in the last 4 days and feels that she is doing okay without it.  She states most of her emotional issues began 2 years ago when she got a divorce.  Patient denies SI or HI.         ROS:  Review of Systems   Constitutional:  Negative for fatigue, fever and unexpected weight change.   HENT:  Negative for congestion, ear pain, rhinorrhea, sinus pressure, sinus pain and voice change.    Eyes:  Negative for visual disturbance.   Respiratory:  Negative for shortness of breath and wheezing.    Cardiovascular:  Negative for chest pain and palpitations.   Gastrointestinal:  Positive for abdominal distention, abdominal pain and constipation. Negative for nausea and vomiting.   Genitourinary:  Negative for dysuria and flank pain.   Musculoskeletal:  Negative for back pain, myalgias and neck pain.   Skin:  Negative for color change and rash.   Neurological:  Negative for dizziness, weakness, numbness and headaches.   Psychiatric/Behavioral:  Negative for behavioral problems, decreased concentration, dysphoric mood, self-injury and sleep disturbance. The patient is not nervous/anxious.       reports that she has never smoked. She has never been exposed to tobacco smoke. She has never used smokeless tobacco. She reports current alcohol use of about 8.0 standard drinks per week.  "She reports that she does not use drugs.    Current Outpatient Medications   Medication Instructions    acebutolol (SECTRAL) 200 mg, Oral, Daily    Allegra-D Allergy & Congestion 180-240 MG per 24 hr tablet 1 tablet, Oral, Daily, For inner ear symptoms and allergy control    ALPRAZolam (XANAX) 0.5 mg, Oral, 2 Times Daily PRN    Co Q-10 200 mg, Oral, Daily    ibuprofen (ADVIL,MOTRIN) 200 MG tablet 1 tablet, Oral, Every 6 Hours PRN    ipratropium (ATROVENT) 0.06 % nasal spray INHALE 2 SPRAYS IN EACH NOSTRIL THREE TIMES DAILY AS NEEDED FOR ALLERGY AND CONGESTION    levonorgestrel (MIRENA) 20 MCG/24HR IUD 1 each, Intrauterine, Once    pravastatin (PRAVACHOL) 40 mg, Oral, Nightly    Probiotic Product (PROBIOTIC ADVANCED PO) 1 capsule, Oral, Daily    Syringe/Needle, Disp, (BD Integra Syringe) 25G X 1\" 3 ML misc USE WITH B12 SHOT    valACYclovir (VALTREX) 500 mg, Oral, Daily    venlafaxine (EFFEXOR) 75 MG tablet 3 tablets daily       OBJECTIVE:  /90 (BP Location: Left arm, Patient Position: Sitting, Cuff Size: Adult)   Pulse 67   Temp 98 øF (36.7 øC) (Infrared)   Resp 18   Ht 152.4 cm (60\")   Wt 70.5 kg (155 lb 8 oz)   SpO2 97%   BMI 30.37 kg/mý    Physical Exam  Vitals and nursing note reviewed.   Constitutional:       Appearance: Normal appearance. She is well-developed.   HENT:      Head: Normocephalic and atraumatic.      Right Ear: Tympanic membrane, ear canal and external ear normal.      Left Ear: Tympanic membrane, ear canal and external ear normal.      Nose: Nose normal. No septal deviation, nasal tenderness or congestion.      Mouth/Throat:      Lips: Pink. No lesions.      Mouth: Mucous membranes are moist. No oral lesions.      Dentition: Normal dentition.      Pharynx: Oropharynx is clear. No pharyngeal swelling, oropharyngeal exudate or posterior oropharyngeal erythema.   Eyes:      General: Lids are normal. Vision grossly intact. No scleral icterus.        Right eye: No discharge.         Left " eye: No discharge.      Extraocular Movements: Extraocular movements intact.      Conjunctiva/sclera: Conjunctivae normal.      Right eye: Right conjunctiva is not injected.      Left eye: Left conjunctiva is not injected.      Pupils: Pupils are equal, round, and reactive to light.   Neck:      Thyroid: No thyroid mass.      Trachea: Trachea normal.   Cardiovascular:      Rate and Rhythm: Normal rate and regular rhythm.      Heart sounds: Normal heart sounds. No murmur heard.    No gallop.   Pulmonary:      Effort: Pulmonary effort is normal.      Breath sounds: Normal breath sounds and air entry. No wheezing, rhonchi or rales.   Musculoskeletal:         General: No tenderness or deformity. Normal range of motion.      Cervical back: Full passive range of motion without pain, normal range of motion and neck supple.      Thoracic back: Normal.      Right lower leg: No edema.      Left lower leg: No edema.   Skin:     General: Skin is warm and dry.      Coloration: Skin is not jaundiced.      Findings: No rash.   Neurological:      Mental Status: She is alert and oriented to person, place, and time.      Sensory: Sensation is intact.      Motor: Motor function is intact.      Coordination: Coordination is intact.      Gait: Gait is intact.      Deep Tendon Reflexes: Reflexes are normal and symmetric.   Psychiatric:         Mood and Affect: Mood and affect normal.         Behavior: Behavior normal.         Judgment: Judgment normal.       BMI is >= 25 and <30. (Overweight) The following options were offered after discussion;: nutrition counseling/recommendations    Procedures    Assessment/Plan:     Diagnoses and all orders for this visit:    1. Procidentia of rectal sphincter (Primary)  -     Ambulatory Referral to General Surgery    2. Rectal prolapse  -     Ambulatory Referral to General Surgery    3. Anxiety  -     ALPRAZolam (Xanax) 0.5 MG tablet; Take 1 tablet by mouth 2 (Two) Times a Day As Needed for Anxiety.   Dispense: 30 tablet; Refill: 2    4. Irritable bowel syndrome with constipation    5. Vitamin B 12 deficiency  -     Vitamin D 25 hydroxy; Future  -     Vitamin B12; Future  -     Vitamin B6; Future  -     Methylmalonic Acid, Serum; Future  -     Intrinsic Factor Ab; Future    6. Vitamin D deficiency  -     Vitamin D 25 hydroxy; Future    7. Hyperlipidemia, unspecified hyperlipidemia type  -     Lipid Panel; Future        An After Visit Summary was printed and given to the patient at discharge.  Return in about 4 weeks (around 9/20/2023).          Discussion: 30 min of time was spent preparing for the visit, reviewing tests, obtaining and/or reviewing a separately obtained history, performing a medically appropriate examination and/or evaluation, counseling and educating the patient/family/caregiver, ordering medications, tests, or procedures and documenting information in the medical record     Poly CONNOR 8/24/2023   Electronically signed.

## 2023-08-23 NOTE — PATIENT INSTRUCTIONS
I will research urogynecologist who specialize in rectal prolapse. [facility].     Trulance at bedtime.

## 2023-08-24 ENCOUNTER — LAB (OUTPATIENT)
Dept: LAB | Facility: HOSPITAL | Age: 53
End: 2023-08-24
Payer: COMMERCIAL

## 2023-08-24 DIAGNOSIS — E55.9 VITAMIN D DEFICIENCY: ICD-10-CM

## 2023-08-24 DIAGNOSIS — E78.5 HYPERLIPIDEMIA, UNSPECIFIED HYPERLIPIDEMIA TYPE: ICD-10-CM

## 2023-08-24 DIAGNOSIS — E53.8 VITAMIN B 12 DEFICIENCY: ICD-10-CM

## 2023-08-24 LAB
25(OH)D3 SERPL-MCNC: 82.9 NG/ML (ref 30–100)
CHOLEST SERPL-MCNC: 181 MG/DL (ref 0–200)
HDLC SERPL-MCNC: 52 MG/DL (ref 40–60)
LDLC SERPL CALC-MCNC: 97 MG/DL (ref 0–100)
LDLC/HDLC SERPL: 1.75 {RATIO}
TRIGL SERPL-MCNC: 189 MG/DL (ref 0–150)
VIT B12 BLD-MCNC: 563 PG/ML (ref 211–946)
VLDLC SERPL-MCNC: 32 MG/DL (ref 5–40)

## 2023-08-24 PROCEDURE — 84207 ASSAY OF VITAMIN B-6: CPT

## 2023-08-24 PROCEDURE — 82607 VITAMIN B-12: CPT

## 2023-08-24 PROCEDURE — 82306 VITAMIN D 25 HYDROXY: CPT

## 2023-08-24 PROCEDURE — 86340 INTRINSIC FACTOR ANTIBODY: CPT

## 2023-08-24 PROCEDURE — 80061 LIPID PANEL: CPT

## 2023-08-24 PROCEDURE — 36415 COLL VENOUS BLD VENIPUNCTURE: CPT

## 2023-08-24 PROCEDURE — 83921 ORGANIC ACID SINGLE QUANT: CPT

## 2023-08-26 LAB — IF BLOCK AB SER-ACNC: 1 AU/ML (ref 0–1.1)

## 2023-08-27 LAB — PYRIDOXAL PHOS SERPL-MCNC: 68.2 UG/L (ref 3.4–65.2)

## 2023-08-28 LAB — METHYLMALONATE SERPL-SCNC: 153 NMOL/L (ref 0–378)

## 2023-09-09 DIAGNOSIS — I34.1 MITRAL VALVE PROLAPSE: ICD-10-CM

## 2023-09-11 DIAGNOSIS — J30.89 NON-SEASONAL ALLERGIC RHINITIS DUE TO OTHER ALLERGIC TRIGGER: ICD-10-CM

## 2023-09-11 RX ORDER — IPRATROPIUM BROMIDE 42 UG/1
SPRAY, METERED NASAL
Qty: 45 ML | Refills: 3 | Status: SHIPPED | OUTPATIENT
Start: 2023-09-11

## 2023-09-11 RX ORDER — PRAVASTATIN SODIUM 20 MG
TABLET ORAL
Qty: 90 TABLET | Refills: 0 | OUTPATIENT
Start: 2023-09-11

## 2023-09-19 ENCOUNTER — TELEPHONE (OUTPATIENT)
Dept: GASTROENTEROLOGY | Facility: CLINIC | Age: 53
End: 2023-09-19
Payer: COMMERCIAL

## 2023-09-19 NOTE — TELEPHONE ENCOUNTER
Received call from Dr. Aguero office they are seeing patient for rectal prolapse and requested records. I have sent those for continuation of care.

## 2023-09-22 ENCOUNTER — TELEMEDICINE (OUTPATIENT)
Dept: FAMILY MEDICINE CLINIC | Facility: CLINIC | Age: 53
End: 2023-09-22
Payer: COMMERCIAL

## 2023-09-22 DIAGNOSIS — K62.2: Primary | ICD-10-CM

## 2023-09-22 DIAGNOSIS — K62.3 RECTAL PROLAPSE: ICD-10-CM

## 2023-09-22 DIAGNOSIS — K58.1 IRRITABLE BOWEL SYNDROME WITH CONSTIPATION: ICD-10-CM

## 2023-09-22 PROCEDURE — 99214 OFFICE O/P EST MOD 30 MIN: CPT | Performed by: NURSE PRACTITIONER

## 2023-09-22 NOTE — PROGRESS NOTES
NIKOLAS Adair  [facility]   Family Medicine  [address]  [place] [address]  Phone: [phone]  Fax: [phone]     Viviana Arzate is a 53 y.o. female.   : 1970    This visit is conducted today via video visit 2' covid-19 epidemic.  You have chosen to receive care through a telehealth visit.  Do you consent to use a video/audio connection for your medical care today? Yes    Pt located at Work and provider located at home office.     CC:   Chief Complaint   Patient presents with    Follow-up     4 week fu        HPI: Viviana Arzate is a 53 y.o. female that is an established patient. She  is here for evaluation of the above complaint.    2023: pt reports that she has established with Dr. Guzman in [facility] for surgical consult regarding her rectal prolapse. She states that she is scheduled to have surgery in November. Reports that she is eating lots of cucumbers, and has decreased on constipating foods.   2023: complains of rectal discomfort for years but 2022 noted to have rectal issue every 3 months. States that she started experiencing another flare of symptoms. Urgency of defication, but unable to have BM.  Pt states that she feels something is hanging out of her rectum. Has a picture of this today. States that she has had a bowel movements since this started that is like soft serve ice cream.  Since 2022 she has Bms that consit of small round balls, normal bms, every 3 days. Recent colonoscopy per Dr. Peña 2022, was told everything was WNL. Pt states that when she strains to go to the bathroom she has also had to digitally manneuver to aid in Bms. She also reports having some mucus like discharge from her rectum at times.   2023: Pt here to follow up since medication changes. Pt reports that she was unable to tolerate the trileptal. She states that she took the medication for 4 nights and had palpitations every night. She  states that she found an old effexor 75mg. She states that she took this along with her 150mg dose and felt much better. Pt has been off of adderall for over one month. Pt reports that she is still taking xanax at bedtime for insomnia. She notes that she has to have this medication to sleep. Pt is also here to discuss genesite testing.   3/02/2023: Patient here to establish with a new care provider.  Previous patient of Dr. Hood.  Patient reports issues with anxiety, panic attacks, depression, insomnia.  She reports that she has had increased stress at work recently.  She states she had a meltdown at work 2 months ago and contacted Dr. Hood who prescribed Lamictal.  She states her taking Lamictal for 5 days she persistent nosebleed and a rash.  She states that she stopped taking the medication.  She reports taking Xanax 0.5 mg at bedtime and sometimes takes up to 1 g/day.  She states she has not prescribed this but received some from a friend.  She states that she also takes Effexor 150 mg daily and has for the last 10 years.  She states she has tried Lexapro in the past, Prozac, Celexa.  She states she is also prescribed Adderall Exar 20 mg for ADHD.  She states she has not taken her Adderall in the last 4 days and feels that she is doing okay without it.  She states most of her emotional issues began 2 years ago when she got a divorce.  Patient denies SI or HI.       The following portions of the patient's history were reviewed and updated as appropriate: allergies, current medications, past family history, past medical history, past social history, past surgical history, and problem list.  Past Medical History:   Diagnosis Date    ADHD (attention deficit hyperactivity disorder)     Allergic rhinitis     Anxiety     Family history of colonic polyps     IBS (irritable bowel syndrome)     MVP (mitral valve prolapse)     Myocardial infarction     Vitamin D deficiency      Family History   Problem Relation Age of  Onset    Colon polyps Mother         < 60 years of age    Irritable bowel syndrome Mother         Possibly Crohn's or UC but pt unsure    Heart disease Father          of heart disease    Colon cancer Neg Hx     Esophageal cancer Neg Hx     Liver cancer Neg Hx     Stomach cancer Neg Hx     Rectal cancer Neg Hx     Liver disease Neg Hx      Social History     Socioeconomic History    Marital status:    Tobacco Use    Smoking status: Never     Passive exposure: Never    Smokeless tobacco: Never   Vaping Use    Vaping Use: Never used   Substance and Sexual Activity    Alcohol use: Yes     Alcohol/week: 8.0 standard drinks     Types: 5 Glasses of wine, 3 Shots of liquor per week     Comment: Occasional-3- 4 glasses of wine a week    Drug use: No    Sexual activity: Yes     Partners: Male     Birth control/protection: I.U.D.     Comment: Mirena     Review of Systems   Constitutional:  Negative for appetite change, diaphoresis, fatigue and fever.   HENT:  Negative for ear pain, hearing loss, mouth sores, sinus pressure, sneezing, sore throat and voice change.    Eyes:  Negative for discharge, itching and visual disturbance.   Respiratory:  Negative for cough, shortness of breath and wheezing.    Cardiovascular:  Negative for chest pain and palpitations.   Gastrointestinal:  Positive for abdominal pain, constipation and rectal pain. Negative for diarrhea and vomiting.   Musculoskeletal:  Negative for arthralgias, back pain and myalgias.   Skin:  Negative for rash and wound.   Neurological:  Negative for dizziness, seizures, weakness, numbness and headache.   Hematological:  Negative for adenopathy. Does not bruise/bleed easily.   Psychiatric/Behavioral:  Negative for agitation, dysphoric mood, sleep disturbance and depressed mood. The patient is not nervous/anxious.    There were no vitals taken for this visit.  Physical Exam  Vitals and nursing note reviewed.   Constitutional:       Appearance: Normal  appearance. She is well-developed.   HENT:      Head: Normocephalic and atraumatic.      Mouth/Throat:      Lips: Pink. No lesions.   Eyes:      General: Lids are normal. Vision grossly intact.      Conjunctiva/sclera: Conjunctivae normal.      Right eye: Right conjunctiva is not injected.      Left eye: Left conjunctiva is not injected.   Pulmonary:      Effort: Pulmonary effort is normal.   Musculoskeletal:         General: Normal range of motion.      Cervical back: Full passive range of motion without pain, normal range of motion and neck supple.   Skin:     General: Skin is dry.   Neurological:      Mental Status: She is alert and oriented to person, place, and time.      Motor: Motor function is intact.   Psychiatric:         Mood and Affect: Mood and affect normal.         Judgment: Judgment normal.         Assessment and Plan:   Diagnoses and all orders for this visit:    1. Procidentia of rectal sphincter (Primary)    2. Irritable bowel syndrome with constipation    3. Rectal prolapse          There are no Patient Instructions on file for this visit.    Discussion:     I spent 30 minutes caring for Viviana on this date of service. This time includes time spent by me in the following activities: preparing for the visit, reviewing tests, obtaining and/or reviewing a separately obtained history, performing a medically appropriate examination and/or evaluation, counseling and educating the patient/family/caregiver, ordering medications, tests, or procedures, documenting information in the medical record, independently interpreting results and communicating that information with the patient/family/caregiver, and care coordination     Follow up:   Poly Linares, NIKOLAS  9/22/2023  19:36 CDT

## 2023-10-11 DIAGNOSIS — F41.9 ANXIETY: ICD-10-CM

## 2023-10-11 DIAGNOSIS — G47.00 INSOMNIA, UNSPECIFIED TYPE: ICD-10-CM

## 2023-10-11 DIAGNOSIS — R07.9 CHEST PAIN, UNSPECIFIED TYPE: ICD-10-CM

## 2023-10-11 DIAGNOSIS — R45.86 MOOD SWINGS: ICD-10-CM

## 2023-10-11 DIAGNOSIS — F32.A DEPRESSION, UNSPECIFIED DEPRESSION TYPE: ICD-10-CM

## 2023-10-12 RX ORDER — UBIDECARENONE 75 MG
1 CAPSULE ORAL DAILY
Qty: 30 EACH | Refills: 0 | Status: SHIPPED | OUTPATIENT
Start: 2023-10-12

## 2023-10-12 RX ORDER — VENLAFAXINE 75 MG/1
TABLET ORAL
Qty: 90 TABLET | Refills: 2 | Status: SHIPPED | OUTPATIENT
Start: 2023-10-12

## 2023-11-10 DIAGNOSIS — R07.9 CHEST PAIN, UNSPECIFIED TYPE: ICD-10-CM

## 2023-11-10 DIAGNOSIS — F41.9 ANXIETY: ICD-10-CM

## 2023-11-10 RX ORDER — ALPRAZOLAM 0.5 MG/1
0.5 TABLET ORAL 2 TIMES DAILY PRN
Qty: 30 TABLET | Refills: 2 | Status: SHIPPED | OUTPATIENT
Start: 2023-11-10

## 2023-11-10 RX ORDER — UBIDECARENONE 75 MG
1 CAPSULE ORAL DAILY
Qty: 30 EACH | Refills: 0 | Status: SHIPPED | OUTPATIENT
Start: 2023-11-10

## 2023-11-10 NOTE — TELEPHONE ENCOUNTER
Rx Refill Note  Requested Prescriptions     Pending Prescriptions Disp Refills    ALPRAZolam (XANAX) 0.5 MG tablet [Pharmacy Med Name: ALPRAZOLAM 0.5MG TABLETS] 30 tablet 2     Sig: TAKE 1 TABLET BY MOUTH TWICE DAILY AS NEEDED FOR ANXIETY     Signed Prescriptions Disp Refills    Coenzyme Q10 (Co Q-10) 200 MG capsule 30 each 0     Sig: Take 1 capsule by mouth Daily.     Authorizing Provider: MAEGAN RIVERA     Ordering User: CECI ROSA      Last office visit with prescribing clinician: 8/23/2023   Last telemedicine visit with prescribing clinician: 9/22/2023   Next office visit with prescribing clinician: Visit date not found                         Would you like a call back once the refill request has been completed: [] Yes [] No    If the office needs to give you a call back, can they leave a voicemail: [] Yes [] No    Ceci Rosa MA  11/10/23, 14:14 CST

## 2023-12-07 DIAGNOSIS — R07.9 CHEST PAIN, UNSPECIFIED TYPE: ICD-10-CM

## 2023-12-07 RX ORDER — UBIDECARENONE 75 MG
1 CAPSULE ORAL DAILY
Qty: 30 EACH | Refills: 2 | Status: SHIPPED | OUTPATIENT
Start: 2023-12-07

## 2023-12-15 DIAGNOSIS — E55.9 VITAMIN D DEFICIENCY: ICD-10-CM

## 2023-12-15 DIAGNOSIS — E53.8 VITAMIN B 12 DEFICIENCY: Primary | ICD-10-CM

## 2023-12-20 ENCOUNTER — LAB (OUTPATIENT)
Dept: LAB | Facility: HOSPITAL | Age: 53
End: 2023-12-20
Payer: COMMERCIAL

## 2023-12-20 ENCOUNTER — OFFICE VISIT (OUTPATIENT)
Dept: FAMILY MEDICINE CLINIC | Facility: CLINIC | Age: 53
End: 2023-12-20
Payer: COMMERCIAL

## 2023-12-20 VITALS
DIASTOLIC BLOOD PRESSURE: 80 MMHG | TEMPERATURE: 97.3 F | OXYGEN SATURATION: 97 % | WEIGHT: 152.13 LBS | SYSTOLIC BLOOD PRESSURE: 122 MMHG | HEIGHT: 60 IN | RESPIRATION RATE: 19 BRPM | HEART RATE: 66 BPM | BODY MASS INDEX: 29.86 KG/M2

## 2023-12-20 DIAGNOSIS — F41.9 ANXIETY: Chronic | ICD-10-CM

## 2023-12-20 DIAGNOSIS — Z98.890 HISTORY OF RECTAL SURGERY: Primary | ICD-10-CM

## 2023-12-20 DIAGNOSIS — E78.00 ELEVATED CHOLESTEROL: ICD-10-CM

## 2023-12-20 DIAGNOSIS — R79.89 ELEVATED SERUM CREATININE: ICD-10-CM

## 2023-12-20 DIAGNOSIS — R00.2 PALPITATIONS: Chronic | ICD-10-CM

## 2023-12-20 DIAGNOSIS — E53.8 VITAMIN B 12 DEFICIENCY: ICD-10-CM

## 2023-12-20 DIAGNOSIS — R79.89 ELEVATED SERUM CREATININE: Chronic | ICD-10-CM

## 2023-12-20 DIAGNOSIS — R45.86 MOOD SWINGS: Chronic | ICD-10-CM

## 2023-12-20 DIAGNOSIS — E55.9 VITAMIN D DEFICIENCY: ICD-10-CM

## 2023-12-20 DIAGNOSIS — F32.A DEPRESSION, UNSPECIFIED DEPRESSION TYPE: Chronic | ICD-10-CM

## 2023-12-20 DIAGNOSIS — E78.00 ELEVATED CHOLESTEROL: Chronic | ICD-10-CM

## 2023-12-20 DIAGNOSIS — G47.00 INSOMNIA, UNSPECIFIED TYPE: Chronic | ICD-10-CM

## 2023-12-20 LAB
25(OH)D3 SERPL-MCNC: 51 NG/ML (ref 30–100)
ALBUMIN SERPL-MCNC: 4.1 G/DL (ref 3.5–5.2)
ALBUMIN/GLOB SERPL: 1.3 G/DL
ALP SERPL-CCNC: 102 U/L (ref 39–117)
ALT SERPL W P-5'-P-CCNC: 21 U/L (ref 1–33)
ANION GAP SERPL CALCULATED.3IONS-SCNC: 6 MMOL/L (ref 5–15)
AST SERPL-CCNC: 36 U/L (ref 1–32)
BILIRUB SERPL-MCNC: 0.4 MG/DL (ref 0–1.2)
BUN SERPL-MCNC: 11 MG/DL (ref 6–20)
BUN/CREAT SERPL: 12.1 (ref 7–25)
CALCIUM SPEC-SCNC: 9.2 MG/DL (ref 8.6–10.5)
CHLORIDE SERPL-SCNC: 106 MMOL/L (ref 98–107)
CHOLEST SERPL-MCNC: 151 MG/DL (ref 0–200)
CO2 SERPL-SCNC: 28 MMOL/L (ref 22–29)
CREAT SERPL-MCNC: 0.91 MG/DL (ref 0.57–1)
EGFRCR SERPLBLD CKD-EPI 2021: 76.5 ML/MIN/1.73
GLOBULIN UR ELPH-MCNC: 3.1 GM/DL
GLUCOSE SERPL-MCNC: 91 MG/DL (ref 65–99)
HDLC SERPL-MCNC: 45 MG/DL (ref 40–60)
LDLC SERPL CALC-MCNC: 81 MG/DL (ref 0–100)
LDLC/HDLC SERPL: 1.73 {RATIO}
POTASSIUM SERPL-SCNC: 4.3 MMOL/L (ref 3.5–5.2)
PROT SERPL-MCNC: 7.2 G/DL (ref 6–8.5)
SODIUM SERPL-SCNC: 140 MMOL/L (ref 136–145)
TRIGL SERPL-MCNC: 141 MG/DL (ref 0–150)
VIT B12 BLD-MCNC: 480 PG/ML (ref 211–946)
VLDLC SERPL-MCNC: 25 MG/DL (ref 5–40)

## 2023-12-20 PROCEDURE — 80053 COMPREHEN METABOLIC PANEL: CPT

## 2023-12-20 PROCEDURE — 82306 VITAMIN D 25 HYDROXY: CPT

## 2023-12-20 PROCEDURE — 36415 COLL VENOUS BLD VENIPUNCTURE: CPT

## 2023-12-20 PROCEDURE — 80061 LIPID PANEL: CPT

## 2023-12-20 PROCEDURE — 82607 VITAMIN B-12: CPT

## 2023-12-20 RX ORDER — ACEBUTOLOL HYDROCHLORIDE 200 MG/1
200 CAPSULE ORAL DAILY
Qty: 30 CAPSULE | Refills: 5 | Status: SHIPPED | OUTPATIENT
Start: 2023-12-20

## 2023-12-20 RX ORDER — FLUTICASONE PROPIONATE 50 MCG
SPRAY, SUSPENSION (ML) NASAL
COMMUNITY
Start: 2023-09-08

## 2023-12-20 RX ORDER — VENLAFAXINE 75 MG/1
TABLET ORAL
Qty: 60 TABLET | Refills: 2 | Status: SHIPPED | OUTPATIENT
Start: 2023-12-20

## 2023-12-20 NOTE — PROGRESS NOTES
[facility]   Family Medicine  [address]  [place] [address]  Phone: [phone]  Fax: [phone]         Chief Complaint:  Chief Complaint   Patient presents with    Annual Exam        History:  Viviana Arzate is a 53 y.o. female that is an established patient. She  is here for evaluation of the above complaint.    HPI       12/20/2023: She had surgery on 11/07/2023 with Dr. Guzman. She stayed in the hospital 1 night. The first morning, she was already walking and was ready to go home. She had not been taking pain medicines. She went to work for 3 days and then had to go back. She was taken off for the next 2 weeks. She did not hurt anything, but strained it. Her bowel movements have drastically imprvoed. She is not straining to go to the bathroom. She takes a probiotic. She was told to take fiber if it starts getting hard, but she has not.    12/20/2023: She has been out of town since Friday. She is going to have blood work done this morning for B12 and vitamin D. She is very tired. She has not done anything for 5 weeks. She was taking vitamin D once a week on Mondays. She had her Pap smear on Thursday of last week. Her mammogram was normal. She has dense breasts.  Nov 7 had surgery per Dr. Guzman. Doing well, no issues. Taking probiotic to help with BMS.            ROS:  Review of Systems   Constitutional:  Negative for fatigue, fever and unexpected weight change.   HENT:  Negative for congestion, ear pain, rhinorrhea, sinus pressure, sinus pain and voice change.    Eyes:  Negative for visual disturbance.   Respiratory:  Negative for shortness of breath and wheezing.    Cardiovascular:  Negative for chest pain and palpitations.   Gastrointestinal:  Negative for abdominal pain, nausea and vomiting.   Genitourinary:  Negative for dysuria and flank pain.   Musculoskeletal:  Negative for back pain, myalgias and neck pain.   Skin:  Negative for color change  "and rash.   Neurological:  Negative for dizziness, weakness, numbness and headaches.   Psychiatric/Behavioral:  Negative for behavioral problems, dysphoric mood, self-injury and sleep disturbance.         reports that she has never smoked. She has never been exposed to tobacco smoke. She has never used smokeless tobacco. She reports current alcohol use of about 8.0 standard drinks of alcohol per week. She reports that she does not use drugs.    Current Outpatient Medications   Medication Instructions    acebutolol (SECTRAL) 200 mg, Oral, Daily    Allegra-D Allergy & Congestion 180-240 MG per 24 hr tablet 1 tablet, Oral, Daily, For inner ear symptoms and allergy control    ALPRAZolam (XANAX) 0.5 mg, Oral, 2 Times Daily PRN, for anxiety    Coenzyme Q10 (Co Q-10) 200 MG capsule 1 capsule, Oral, Daily    fluticasone (FLONASE) 50 MCG/ACT nasal spray     ibuprofen (ADVIL,MOTRIN) 200 MG tablet 1 tablet, Oral, Every 6 Hours PRN    ipratropium (ATROVENT) 0.06 % nasal spray INHALE 2 SPRAYS IN EACH NOSTRIL THREE TIMES DAILY AS NEEDED FOR ALLERGY AND CONGESTION    levonorgestrel (MIRENA) 20 MCG/24HR IUD 1 each, Intrauterine, Once    pravastatin (PRAVACHOL) 40 mg, Oral, Nightly    Probiotic Product (PROBIOTIC ADVANCED PO) 1 capsule, Oral, Daily    Syringe/Needle, Disp, (BD Integra Syringe) 25G X 1\" 3 ML misc USE WITH B12 SHOT    valACYclovir (VALTREX) 500 mg, Oral, Daily    venlafaxine (EFFEXOR) 75 MG tablet TAKE 2 TABLETS BY MOUTH DAILY       OBJECTIVE:  /80 (BP Location: Left arm, Patient Position: Sitting, Cuff Size: Adult)   Pulse 66   Temp 97.3 °F (36.3 °C) (Infrared)   Resp 19   Ht 152.4 cm (60\")   Wt 69 kg (152 lb 2 oz)   SpO2 97%   BMI 29.71 kg/m²    Physical Exam  Vitals and nursing note reviewed.   Constitutional:       Appearance: Normal appearance. She is well-developed.   HENT:      Head: Normocephalic and atraumatic.      Right Ear: Tympanic membrane, ear canal and external ear normal.      Left Ear: " Tympanic membrane, ear canal and external ear normal.      Nose: Nose normal. No septal deviation, nasal tenderness or congestion.      Mouth/Throat:      Lips: Pink. No lesions.      Mouth: Mucous membranes are moist. No oral lesions.      Dentition: Normal dentition.      Pharynx: Oropharynx is clear. No pharyngeal swelling, oropharyngeal exudate or posterior oropharyngeal erythema.   Eyes:      General: Lids are normal. Vision grossly intact. No scleral icterus.        Right eye: No discharge.         Left eye: No discharge.      Extraocular Movements: Extraocular movements intact.      Conjunctiva/sclera: Conjunctivae normal.      Right eye: Right conjunctiva is not injected.      Left eye: Left conjunctiva is not injected.      Pupils: Pupils are equal, round, and reactive to light.   Neck:      Thyroid: No thyroid mass.      Trachea: Trachea normal.   Cardiovascular:      Rate and Rhythm: Normal rate and regular rhythm.      Heart sounds: Normal heart sounds. No murmur heard.     No gallop.   Pulmonary:      Effort: Pulmonary effort is normal.      Breath sounds: Normal breath sounds and air entry. No wheezing, rhonchi or rales.   Musculoskeletal:         General: No tenderness or deformity. Normal range of motion.      Cervical back: Full passive range of motion without pain, normal range of motion and neck supple.      Thoracic back: Normal.      Right lower leg: No edema.      Left lower leg: No edema.   Skin:     General: Skin is warm and dry.      Coloration: Skin is not jaundiced.      Findings: No rash.   Neurological:      Mental Status: She is alert and oriented to person, place, and time.      Sensory: Sensation is intact.      Motor: Motor function is intact.      Coordination: Coordination is intact.      Gait: Gait is intact.      Deep Tendon Reflexes: Reflexes are normal and symmetric.   Psychiatric:         Mood and Affect: Mood and affect normal.         Behavior: Behavior normal.          Judgment: Judgment normal.         Procedures    Assessment/Plan:     Diagnoses and all orders for this visit:    1. History of rectal surgery (Primary)    2. Elevated serum creatinine  Comments:  Will repeat labs to monitor.  Orders:  -     Comprehensive metabolic panel; Future    3. Elevated cholesterol  Comments:  Will repeat lipids.  Orders:  -     Lipid panel; Future    4. Anxiety  Comments:  Stable on current therapy.  Orders:  -     venlafaxine (EFFEXOR) 75 MG tablet; TAKE 2 TABLETS BY MOUTH DAILY  Dispense: 60 tablet; Refill: 2    5. Depression, unspecified depression type  Comments:  stable.  Orders:  -     venlafaxine (EFFEXOR) 75 MG tablet; TAKE 2 TABLETS BY MOUTH DAILY  Dispense: 60 tablet; Refill: 2    6. Mood swings  Comments:  stable.  Orders:  -     venlafaxine (EFFEXOR) 75 MG tablet; TAKE 2 TABLETS BY MOUTH DAILY  Dispense: 60 tablet; Refill: 2    7. Insomnia, unspecified type  Comments:  stable.  Orders:  -     venlafaxine (EFFEXOR) 75 MG tablet; TAKE 2 TABLETS BY MOUTH DAILY  Dispense: 60 tablet; Refill: 2    8. Palpitations  Comments:  stable.  Orders:  -     acebutolol (SECTRAL) 200 MG capsule; Take 1 capsule by mouth Daily.  Dispense: 30 capsule; Refill: 5           An After Visit Summary was printed and given to the patient at discharge.  Return in about 3 months (around 3/20/2024).       There are no Patient Instructions on file for this visit.      Discussion:     I spent 30 minutes caring for Viviana on this date of service. This time includes time spent by me in the following activities: preparing for the visit, reviewing tests, obtaining and/or reviewing a separately obtained history, performing a medically appropriate examination and/or evaluation, counseling and educating the patient/family/caregiver, ordering medications, tests, or procedures, documenting information in the medical record, independently interpreting results and communicating that information with the  patient/family/caregiver, and care coordination     Poly CONNOR 12/20/2023   Electronically signed.    Transcribed from ambient dictation for NIKOLAS Adair by Kiki Seymour, Quality .  12/20/23   10:07 CST    Patient or patient representative verbalized consent to the visit recording.  I have personally performed the services described in this document as transcribed by the above individual, and it is both accurate and complete.

## 2023-12-21 ENCOUNTER — PATIENT MESSAGE (OUTPATIENT)
Dept: FAMILY MEDICINE CLINIC | Facility: CLINIC | Age: 53
End: 2023-12-21
Payer: COMMERCIAL

## 2024-01-16 DIAGNOSIS — E53.8 VITAMIN B 12 DEFICIENCY: Primary | ICD-10-CM

## 2024-01-16 RX ORDER — CYANOCOBALAMIN 1000 UG/ML
1000 INJECTION, SOLUTION INTRAMUSCULAR; SUBCUTANEOUS
Qty: 1 ML | Refills: 11 | Status: SHIPPED | OUTPATIENT
Start: 2024-01-16

## 2024-02-02 DIAGNOSIS — F41.9 ANXIETY: Chronic | ICD-10-CM

## 2024-02-02 DIAGNOSIS — R07.9 CHEST PAIN, UNSPECIFIED TYPE: ICD-10-CM

## 2024-02-02 DIAGNOSIS — F32.A DEPRESSION, UNSPECIFIED DEPRESSION TYPE: Chronic | ICD-10-CM

## 2024-02-02 DIAGNOSIS — G47.00 INSOMNIA, UNSPECIFIED TYPE: Chronic | ICD-10-CM

## 2024-02-02 DIAGNOSIS — R45.86 MOOD SWINGS: Chronic | ICD-10-CM

## 2024-02-02 RX ORDER — UBIDECARENONE 75 MG
CAPSULE ORAL
OUTPATIENT
Start: 2024-02-02

## 2024-02-02 RX ORDER — VENLAFAXINE 75 MG/1
TABLET ORAL
Qty: 90 TABLET | OUTPATIENT
Start: 2024-02-02

## 2024-02-02 RX ORDER — VENLAFAXINE 75 MG/1
TABLET ORAL
Qty: 60 TABLET | Refills: 2 | Status: SHIPPED | OUTPATIENT
Start: 2024-02-02

## 2024-02-27 DIAGNOSIS — R07.9 CHEST PAIN, UNSPECIFIED TYPE: ICD-10-CM

## 2024-02-27 RX ORDER — UBIDECARENONE 75 MG
1 CAPSULE ORAL DAILY
Qty: 90 EACH | Refills: 0 | Status: SHIPPED | OUTPATIENT
Start: 2024-02-27

## 2024-03-06 ENCOUNTER — OFFICE VISIT (OUTPATIENT)
Dept: FAMILY MEDICINE CLINIC | Facility: CLINIC | Age: 54
End: 2024-03-06
Payer: COMMERCIAL

## 2024-03-06 ENCOUNTER — LAB (OUTPATIENT)
Dept: LAB | Facility: HOSPITAL | Age: 54
End: 2024-03-06
Payer: COMMERCIAL

## 2024-03-06 VITALS
SYSTOLIC BLOOD PRESSURE: 120 MMHG | DIASTOLIC BLOOD PRESSURE: 80 MMHG | TEMPERATURE: 95 F | WEIGHT: 153 LBS | RESPIRATION RATE: 18 BRPM | BODY MASS INDEX: 30.04 KG/M2 | HEART RATE: 61 BPM | OXYGEN SATURATION: 94 % | HEIGHT: 60 IN

## 2024-03-06 DIAGNOSIS — R42 EPISODIC LIGHTHEADEDNESS: ICD-10-CM

## 2024-03-06 DIAGNOSIS — J30.9 ALLERGIC RHINITIS, UNSPECIFIED SEASONALITY, UNSPECIFIED TRIGGER: ICD-10-CM

## 2024-03-06 DIAGNOSIS — R11.0 NAUSEA: ICD-10-CM

## 2024-03-06 DIAGNOSIS — F41.9 ANXIETY: ICD-10-CM

## 2024-03-06 DIAGNOSIS — H65.23 BILATERAL CHRONIC SEROUS OTITIS MEDIA: Primary | ICD-10-CM

## 2024-03-06 DIAGNOSIS — R42 INTERMITTENT VERTIGO: ICD-10-CM

## 2024-03-06 DIAGNOSIS — Z51.81 MEDICATION MONITORING ENCOUNTER: ICD-10-CM

## 2024-03-06 DIAGNOSIS — H65.493 CHRONIC MEE (MIDDLE EAR EFFUSION), BILATERAL: ICD-10-CM

## 2024-03-06 LAB
AMPHET+METHAMPHET UR QL: NEGATIVE
AMPHETAMINES UR QL: NEGATIVE
BARBITURATES UR QL SCN: NEGATIVE
BENZODIAZ UR QL SCN: POSITIVE
BUPRENORPHINE SERPL-MCNC: NEGATIVE NG/ML
CANNABINOIDS SERPL QL: POSITIVE
COCAINE UR QL: NEGATIVE
FENTANYL UR-MCNC: NEGATIVE NG/ML
METHADONE UR QL SCN: NEGATIVE
OPIATES UR QL: NEGATIVE
OXYCODONE UR QL SCN: NEGATIVE
PCP UR QL SCN: NEGATIVE
TRICYCLICS UR QL SCN: NEGATIVE

## 2024-03-06 PROCEDURE — 80307 DRUG TEST PRSMV CHEM ANLYZR: CPT

## 2024-03-06 RX ORDER — MECLIZINE HYDROCHLORIDE 25 MG/1
25 TABLET ORAL 3 TIMES DAILY PRN
Qty: 30 TABLET | Refills: 0 | Status: SHIPPED | OUTPATIENT
Start: 2024-03-06

## 2024-03-06 RX ORDER — CEFUROXIME AXETIL 500 MG/1
500 TABLET ORAL 2 TIMES DAILY
Qty: 20 TABLET | Refills: 0 | Status: SHIPPED | OUTPATIENT
Start: 2024-03-06

## 2024-03-06 RX ORDER — PROMETHAZINE HYDROCHLORIDE 25 MG/1
25 TABLET ORAL EVERY 6 HOURS PRN
COMMUNITY
Start: 2024-02-01 | End: 2024-03-06 | Stop reason: SDUPTHER

## 2024-03-06 RX ORDER — PROMETHAZINE HYDROCHLORIDE 25 MG/1
25 TABLET ORAL EVERY 6 HOURS PRN
Qty: 30 TABLET | Refills: 0 | Status: SHIPPED | OUTPATIENT
Start: 2024-03-06

## 2024-03-06 RX ORDER — METHYLPREDNISOLONE 4 MG/1
TABLET ORAL
Qty: 21 TABLET | Refills: 0 | Status: SHIPPED | OUTPATIENT
Start: 2024-03-06

## 2024-03-06 RX ORDER — FEXOFENADINE HYDROCHLORIDE AND PSEUDOEPHEDRINE HYDROCHLORIDE 180; 240 MG/1; MG/1
1 TABLET, FILM COATED, EXTENDED RELEASE ORAL DAILY
Qty: 30 TABLET | Refills: 11 | Status: SHIPPED | OUTPATIENT
Start: 2024-03-06

## 2024-03-06 RX ORDER — AZELASTINE 1 MG/ML
2 SPRAY, METERED NASAL 2 TIMES DAILY
Qty: 30 ML | Refills: 12 | Status: SHIPPED | OUTPATIENT
Start: 2024-03-06

## 2024-03-06 NOTE — PATIENT INSTRUCTIONS
Begin medrol dose pack     Begin ceftin twice daily for 10 days.     Start allegra D after completing steroids.     Use astelin nasal spray as discussed.

## 2024-03-06 NOTE — PROGRESS NOTES
Poly GarciatreetNIKOLAS  [facility]   Family Medicine  [address]  [place] [address]  Phone: [phone]  Fax: [phone]         Chief Complaint:  Chief Complaint   Patient presents with    Dizziness        History:  Viviana Arzate is a 53 y.o. female that is an established patient. She  is here for evaluation of the above complaint.    Dizziness  Pertinent negatives include no abdominal pain, chest pain, congestion, fatigue, fever, headaches, myalgias, nausea, neck pain, numbness, rash, vomiting or weakness.        3/6/2024: She has been having intermittent dizziness for a couple of months. Sometimes, she will wake up and be dizzy, or she will be walking down the [address] and get a little bit dizzy. Right now, when she looks up, she feels a little woozy. She is not sure if it is related to her neck and the way she sleeps. When she wakes up dizzy, she has to hold onto the walls. She had some promethazine leftover from the surgery. The promethazine makes her feel like she might throw up, but she never throws up with the dizziness. The whole room is spinning. If she takes the promethazine, she feels fine in 30 minutes. If she does not take the promethazine, she has to lay down because she feels like she is going to throw up. It goes away when she lies down and gets rested. The promethazine helps with the nausea and dizziness. In the last 2 months, she has been waking up dizzy 5 to 7 times. During the day, it is more of a movement. She has never had an episode at the beginning of the morning or closer to night. She denies any ear pain, ringing in her ears, or visual changes. She denies any palpitations, shortness of breath, runny nose, cough, or congestion. Her throat is sore sometimes, but it does not linger. She has increased hearing during the dizziness episodes. She has not passed out. This time last year, this happened, and she was taking Allegra-D 1 tablet daily. Allegra-D dries  it up, and it makes it go away, but it comes back.  12/20/2023: She had surgery on 11/07/2023 with Dr. Guzman. She stayed in the hospital 1 night. The first morning, she was already walking and was ready to go home. She had not been taking pain medicines. She went to work for 3 days and then had to go back. She was taken off for the next 2 weeks. She did not hurt anything, but strained it. Her bowel movements have drastically imprvoed. She is not straining to go to the bathroom. She takes a probiotic. She was told to take fiber if it starts getting hard, but she has not.    12/20/2023: She has been out of town since Friday. She is going to have blood work done this morning for B12 and vitamin D. She is very tired. She has not done anything for 5 weeks. She was taking vitamin D once a week on Mondays. She had her Pap smear on Thursday of last week. Her mammogram was normal. She has dense breasts.  Nov 7 had surgery per Dr. Guzman. Doing well, no issues. Taking probiotic to help with BMS.            ROS:  Review of Systems   Constitutional:  Negative for fatigue, fever and unexpected weight change.   HENT:  Negative for congestion, ear pain, rhinorrhea, sinus pressure, sinus pain and voice change.    Eyes:  Negative for visual disturbance.   Respiratory:  Negative for shortness of breath and wheezing.    Cardiovascular:  Negative for chest pain and palpitations.   Gastrointestinal:  Negative for abdominal pain, nausea and vomiting.   Genitourinary:  Negative for dysuria and flank pain.   Musculoskeletal:  Negative for back pain, myalgias and neck pain.   Skin:  Negative for color change and rash.   Neurological:  Positive for dizziness. Negative for weakness, numbness and headaches.   Psychiatric/Behavioral:  Negative for behavioral problems, dysphoric mood, self-injury and sleep disturbance.         reports that she has never smoked. She has never been exposed to tobacco smoke. She has never used smokeless tobacco. She  "reports current alcohol use of about 8.0 standard drinks of alcohol per week. She reports that she does not use drugs.    Current Outpatient Medications   Medication Instructions    acebutolol (SECTRAL) 200 mg, Oral, Daily    Allegra-D Allergy & Congestion 180-240 MG per 24 hr tablet 1 tablet, Oral, Daily, For inner ear symptoms and allergy control    ALPRAZolam (XANAX) 0.5 mg, Oral, 2 Times Daily PRN, for anxiety    azelastine (ASTELIN) 0.1 % nasal spray 2 sprays, Nasal, 2 Times Daily, Use in each nostril as directed    cefuroxime (CEFTIN) 500 mg, Oral, 2 Times Daily    Coenzyme Q10 (Co Q-10) 200 MG capsule 1 capsule, Oral, Daily    cyanocobalamin 1,000 mcg, Intramuscular, Every 28 Days    fluticasone (FLONASE) 50 MCG/ACT nasal spray     ibuprofen (ADVIL,MOTRIN) 200 MG tablet 1 tablet, Oral, Every 6 Hours PRN    ipratropium (ATROVENT) 0.06 % nasal spray INHALE 2 SPRAYS IN EACH NOSTRIL THREE TIMES DAILY AS NEEDED FOR ALLERGY AND CONGESTION    levonorgestrel (MIRENA) 20 MCG/24HR IUD 1 each, Intrauterine, Once    meclizine (ANTIVERT) 25 mg, Oral, 3 Times Daily PRN    methylPREDNISolone (MEDROL) 4 MG dose pack Take as directed on package instructions.    pravastatin (PRAVACHOL) 40 mg, Oral, Nightly    Probiotic Product (PROBIOTIC ADVANCED PO) 1 capsule, Oral, Daily    promethazine (PHENERGAN) 25 mg, Oral, Every 6 Hours PRN    Syringe/Needle, Disp, (BD Integra Syringe) 25G X 1\" 3 ML misc USE WITH B12 SHOT    valACYclovir (VALTREX) 500 mg, Oral, Daily    venlafaxine (EFFEXOR) 75 MG tablet TAKE 2 TABLETS BY MOUTH DAILY       OBJECTIVE:  /80 (BP Location: Left arm, Patient Position: Sitting, Cuff Size: Adult)   Pulse 61   Temp 95 °F (35 °C) (Infrared)   Resp 18   Ht 152.4 cm (60\")   Wt 69.4 kg (153 lb)   SpO2 94%   BMI 29.88 kg/m²    Physical Exam  Vitals and nursing note reviewed.   Constitutional:       Appearance: Normal appearance. She is well-developed.   HENT:      Head: Normocephalic and atraumatic.     "  Right Ear: Ear canal and external ear normal. A middle ear effusion is present.      Left Ear: Ear canal and external ear normal. A middle ear effusion is present.      Nose: Nose normal. No septal deviation, nasal tenderness or congestion.      Mouth/Throat:      Lips: Pink. No lesions.      Mouth: Mucous membranes are moist. No oral lesions.      Dentition: Normal dentition.      Pharynx: Oropharynx is clear. No pharyngeal swelling, oropharyngeal exudate or posterior oropharyngeal erythema.   Eyes:      General: Lids are normal. Vision grossly intact. No scleral icterus.        Right eye: No discharge.         Left eye: No discharge.      Extraocular Movements: Extraocular movements intact.      Conjunctiva/sclera: Conjunctivae normal.      Right eye: Right conjunctiva is not injected.      Left eye: Left conjunctiva is not injected.      Pupils: Pupils are equal, round, and reactive to light.   Neck:      Thyroid: No thyroid mass.      Trachea: Trachea normal.   Cardiovascular:      Rate and Rhythm: Normal rate and regular rhythm.      Heart sounds: Normal heart sounds. No murmur heard.     No gallop.   Pulmonary:      Effort: Pulmonary effort is normal.      Breath sounds: Normal breath sounds and air entry. No wheezing, rhonchi or rales.   Musculoskeletal:         General: No tenderness or deformity. Normal range of motion.      Cervical back: Full passive range of motion without pain, normal range of motion and neck supple.      Thoracic back: Normal.      Right lower leg: No edema.      Left lower leg: No edema.   Skin:     General: Skin is warm and dry.      Coloration: Skin is not jaundiced.      Findings: No rash.   Neurological:      Mental Status: She is alert and oriented to person, place, and time.      Sensory: Sensation is intact.      Motor: Motor function is intact.      Coordination: Coordination is intact.      Gait: Gait is intact.      Deep Tendon Reflexes: Reflexes are normal and symmetric.    Psychiatric:         Mood and Affect: Mood and affect normal.         Behavior: Behavior normal.         Judgment: Judgment normal.         Procedures    Assessment/Plan:     Diagnoses and all orders for this visit:    1. Bilateral chronic serous otitis media (Primary)  -     methylPREDNISolone (MEDROL) 4 MG dose pack; Take as directed on package instructions.  Dispense: 21 tablet; Refill: 0  -     cefuroxime (CEFTIN) 500 MG tablet; Take 1 tablet by mouth 2 (Two) Times a Day.  Dispense: 20 tablet; Refill: 0  -     azelastine (ASTELIN) 0.1 % nasal spray; 2 sprays into the nostril(s) as directed by provider 2 (Two) Times a Day. Use in each nostril as directed  Dispense: 30 mL; Refill: 12    2. Episodic lightheadedness  -     Allegra-D Allergy & Congestion 180-240 MG per 24 hr tablet; Take 1 tablet by mouth Daily. For inner ear symptoms and allergy control  Dispense: 30 tablet; Refill: 11    3. Intermittent vertigo  Assessment & Plan:  She has been previously diagnosed with chronic middle ear effusion bilaterally. I will prescribe meclizine 3 times a day as needed for dizziness. She will begin Medrol Dosepak and Ceftin twice daily for 10 days. She will start Allegra-D after completing steroids. She will use Astelin nasal spray as discussed. She can still use the Atrovent as needed.    Orders:  -     Allegra-D Allergy & Congestion 180-240 MG per 24 hr tablet; Take 1 tablet by mouth Daily. For inner ear symptoms and allergy control  Dispense: 30 tablet; Refill: 11  -     meclizine (ANTIVERT) 25 MG tablet; Take 1 tablet by mouth 3 (Three) Times a Day As Needed for Dizziness.  Dispense: 30 tablet; Refill: 0    4. Chronic ROHAN (middle ear effusion), bilateral  -     Allegra-D Allergy & Congestion 180-240 MG per 24 hr tablet; Take 1 tablet by mouth Daily. For inner ear symptoms and allergy control  Dispense: 30 tablet; Refill: 11    5. Allergic rhinitis, unspecified seasonality, unspecified trigger  -     azelastine  (ASTELIN) 0.1 % nasal spray; 2 sprays into the nostril(s) as directed by provider 2 (Two) Times a Day. Use in each nostril as directed  Dispense: 30 mL; Refill: 12    6. Anxiety    7. Medication monitoring encounter  -     Urine Drug Screen - Urine, Clean Catch; Future    8. Nausea  -     promethazine (PHENERGAN) 25 MG tablet; Take 1 tablet by mouth Every 6 (Six) Hours As Needed for Nausea or Vomiting.  Dispense: 30 tablet; Refill: 0             An After Visit Summary was printed and given to the patient at discharge.  Return in about 6 months (around 9/6/2024) for 3 month video & 6 month in office.       Patient Instructions   Begin medrol dose pack     Begin ceftin twice daily for 10 days.     Start allegra D after completing steroids.     Use astelin nasal spray as discussed.           Discussion:     I spent 32 minutes caring for Viviana on this date of service. This time includes time spent by me in the following activities: preparing for the visit, reviewing tests, obtaining and/or reviewing a separately obtained history, performing a medically appropriate examination and/or evaluation, counseling and educating the patient/family/caregiver, ordering medications, tests, or procedures, documenting information in the medical record, independently interpreting results and communicating that information with the patient/family/caregiver, and care coordination     Poly CONNOR 3/6/2024   Electronically signed.    Transcribed from ambient dictation for NIKOLAS Adair by Kiki Seymour [employer] .  12/20/23   10:07 CST    Patient or patient representative verbalized consent to the visit recording.  I have personally performed the services described in this document as transcribed by the above individual, and it is both accurate and complete.    Transcribed from ambient dictation for NIKOLAS Adair by Apurva Saeed.  03/06/24   16:17 CST    Patient or  patient representative verbalized consent to the visit recording.  I have personally performed the services described in this document as transcribed by the above individual, and it is both accurate and complete.

## 2024-03-06 NOTE — ASSESSMENT & PLAN NOTE
She has been previously diagnosed with chronic middle ear effusion bilaterally. I will prescribe meclizine 3 times a day as needed for dizziness. She will begin Medrol Dosepak and Ceftin twice daily for 10 days. She will start Allegra-D after completing steroids. She will use Astelin nasal spray as discussed. She can still use the Atrovent as needed.

## 2024-03-28 RX ORDER — PRAVASTATIN SODIUM 40 MG
40 TABLET ORAL NIGHTLY
Qty: 90 TABLET | Refills: 2 | Status: SHIPPED | OUTPATIENT
Start: 2024-03-28

## 2024-03-29 DIAGNOSIS — B00.1 RECURRENT COLD SORES: ICD-10-CM

## 2024-03-29 DIAGNOSIS — H65.23 BILATERAL CHRONIC SEROUS OTITIS MEDIA: ICD-10-CM

## 2024-03-29 RX ORDER — CEFUROXIME AXETIL 500 MG/1
500 TABLET ORAL 2 TIMES DAILY
Qty: 20 TABLET | Refills: 0 | Status: SHIPPED | OUTPATIENT
Start: 2024-03-29

## 2024-03-29 RX ORDER — VALACYCLOVIR HYDROCHLORIDE 500 MG/1
500 TABLET, FILM COATED ORAL DAILY
Qty: 90 TABLET | Refills: 3 | Status: SHIPPED | OUTPATIENT
Start: 2024-03-29

## 2024-03-29 NOTE — TELEPHONE ENCOUNTER
"Caller: Viviana Arzate \"Tae\"    Relationship: Self    Best call back number: [phone]     Requested Prescriptions:   Requested Prescriptions     Pending Prescriptions Disp Refills    valACYclovir (VALTREX) 500 MG tablet 90 tablet 3     Sig: Take 1 tablet by mouth Daily.        Pharmacy where request should be sent: [id number] [place] [facility] [address] [facility] [phone] [facility] [phone]      Last office visit with prescribing clinician: 3/6/2024   Last telemedicine visit with prescribing clinician: Visit date not found   Next office visit with prescribing clinician: 3/29/2024     Additional details provided by patient: PATIENT STATE'S SHE IS OUT OF MEDICATION.    Does the patient have less than a 3 day supply:  [x] Yes  [] No    Would you like a call back once the refill request has been completed: [] Yes [x] No    If the office needs to give you a call back, can they leave a voicemail: [] Yes [x] No    Julita Mendieta Rep   03/29/24 09:00 CDT           "

## 2024-04-08 DIAGNOSIS — J30.89 NON-SEASONAL ALLERGIC RHINITIS DUE TO OTHER ALLERGIC TRIGGER: ICD-10-CM

## 2024-04-08 DIAGNOSIS — F41.9 ANXIETY: ICD-10-CM

## 2024-04-08 RX ORDER — IPRATROPIUM BROMIDE 42 UG/1
SPRAY, METERED NASAL
Qty: 45 ML | Refills: 3 | Status: SHIPPED | OUTPATIENT
Start: 2024-04-08

## 2024-04-08 NOTE — TELEPHONE ENCOUNTER
Rx Refill Note  Requested Prescriptions     Pending Prescriptions Disp Refills    ALPRAZolam (XANAX) 0.5 MG tablet [Pharmacy Med Name: ALPRAZOLAM 0.5MG TABLETS] 30 tablet 0     Sig: TAKE 1 TABLET BY MOUTH TWICE DAILY AS NEEDED FOR ANXIETY      Last office visit with prescribing clinician: 3/6/2024   Last telemedicine visit with prescribing clinician: Visit date not found   Next office visit with prescribing clinician: 6/6/2024                         Would you like a call back once the refill request has been completed: [] Yes [] No    If the office needs to give you a call back, can they leave a voicemail: [] Yes [] No    Ceci Rosa MA  04/08/24, 09:22 CDT

## 2024-04-09 RX ORDER — ALPRAZOLAM 0.5 MG/1
0.5 TABLET ORAL 2 TIMES DAILY PRN
Qty: 30 TABLET | Refills: 0 | Status: SHIPPED | OUTPATIENT
Start: 2024-04-09

## 2024-05-28 DIAGNOSIS — R07.9 CHEST PAIN, UNSPECIFIED TYPE: ICD-10-CM

## 2024-05-28 RX ORDER — UBIDECARENONE 75 MG
1 CAPSULE ORAL DAILY
Qty: 90 EACH | Refills: 0 | Status: SHIPPED | OUTPATIENT
Start: 2024-05-28

## 2024-06-04 ENCOUNTER — TELEPHONE (OUTPATIENT)
Dept: PODIATRY | Facility: CLINIC | Age: 54
End: 2024-06-04
Payer: COMMERCIAL

## 2024-06-04 ENCOUNTER — OFFICE VISIT (OUTPATIENT)
Dept: FAMILY MEDICINE CLINIC | Facility: CLINIC | Age: 54
End: 2024-06-04
Payer: COMMERCIAL

## 2024-06-04 ENCOUNTER — HOSPITAL ENCOUNTER (OUTPATIENT)
Dept: GENERAL RADIOLOGY | Facility: HOSPITAL | Age: 54
Discharge: HOME | End: 2024-06-04
Admitting: NURSE PRACTITIONER
Payer: COMMERCIAL

## 2024-06-04 VITALS
HEIGHT: 60 IN | WEIGHT: 155 LBS | OXYGEN SATURATION: 97 % | TEMPERATURE: 97.8 F | DIASTOLIC BLOOD PRESSURE: 82 MMHG | SYSTOLIC BLOOD PRESSURE: 116 MMHG | RESPIRATION RATE: 18 BRPM | BODY MASS INDEX: 30.43 KG/M2 | HEART RATE: 72 BPM

## 2024-06-04 DIAGNOSIS — H65.493 CHRONIC MEE (MIDDLE EAR EFFUSION), BILATERAL: ICD-10-CM

## 2024-06-04 DIAGNOSIS — M25.472 LEFT ANKLE SWELLING: ICD-10-CM

## 2024-06-04 DIAGNOSIS — J30.9 ALLERGIC RHINITIS, UNSPECIFIED SEASONALITY, UNSPECIFIED TRIGGER: Primary | ICD-10-CM

## 2024-06-04 PROCEDURE — 73610 X-RAY EXAM OF ANKLE: CPT

## 2024-06-04 PROCEDURE — 99214 OFFICE O/P EST MOD 30 MIN: CPT | Performed by: NURSE PRACTITIONER

## 2024-06-04 RX ORDER — METHYLPREDNISOLONE 4 MG/1
TABLET ORAL
Qty: 21 TABLET | Refills: 0 | Status: SHIPPED | OUTPATIENT
Start: 2024-06-04

## 2024-06-04 NOTE — PROGRESS NOTES
NIKOLAS Frost  [facility]   Family Medicine  [address]  [place] [address]  Phone: [phone]  Fax: [phone]         Chief Complaint:  Chief Complaint   Patient presents with    Earache     Right ear hurts but she states both feel like they have fluid on them    Joint Swelling     Her ankle is swollen on the left foot        History:  Viviana Arzate is a 53 y.o. female that is an established patient. She  is here for evaluation of the above complaint and management of chronic conditions.    Earache     Joint Swelling  Associated symptoms include joint swelling.        She continues with bilateral middle ear effusion, ear popping and fullness. This has been a recurrent issue the last year. She does have seasonal allergies. She has not seen ENT yet    She rolled her left ankle 3 weeks ago with increased inflammation, pain. She is taking ibuprofen, 400 mg at night, icing it at night. She continues with inflammation. 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           ROS:  Review of Systems   Constitutional: Negative.    HENT:  Positive for ear pain.    Respiratory: Negative.     Cardiovascular: Negative.    Musculoskeletal:  Positive for joint swelling.         reports that she has never smoked. She has never been exposed to tobacco smoke. She has never used smokeless tobacco. She reports current alcohol use of about 8.0 standard drinks of alcohol per week. She reports that she does not use drugs.    Current Outpatient Medications   Medication Instructions    acebutolol (SECTRAL) 200 mg, Oral, Daily    Allegra-D Allergy & Congestion 180-240 MG per 24 hr tablet 1 tablet, Oral, Daily, For inner ear symptoms and allergy control    ALPRAZolam (XANAX) 0.5 mg, Oral, 2 Times Daily PRN, for anxiety    azelastine (ASTELIN) 0.1 % nasal spray 2 sprays, Nasal, 2 Times Daily, Use in each nostril as directed    cefuroxime (CEFTIN) 500 mg, Oral, 2 Times Daily     "Coenzyme Q10 (Co Q-10) 200 MG capsule 1 capsule, Oral, Daily    cyanocobalamin 1,000 mcg, Intramuscular, Every 28 Days    fluticasone (FLONASE) 50 MCG/ACT nasal spray     ibuprofen (ADVIL,MOTRIN) 200 MG tablet 1 tablet, Oral, Every 6 Hours PRN    ipratropium (ATROVENT) 0.06 % nasal spray INHALE 2 SPRAYS IN EACH NOSTRIL THREE TIMES DAILY AS NEEDED FOR ALLERGY AND CONGESTION    levonorgestrel (MIRENA) 20 MCG/24HR IUD 1 each, Intrauterine, Once    meclizine (ANTIVERT) 25 mg, Oral, 3 Times Daily PRN    methylPREDNISolone (MEDROL) 4 MG dose pack Take as directed on package instructions.    pravastatin (PRAVACHOL) 40 mg, Oral, Nightly    Probiotic Product (PROBIOTIC ADVANCED PO) 1 capsule, Oral, Daily    promethazine (PHENERGAN) 25 mg, Oral, Every 6 Hours PRN    Syringe/Needle, Disp, (BD Integra Syringe) 25G X 1\" 3 ML misc USE WITH B12 SHOT    valACYclovir (VALTREX) 500 mg, Oral, Daily    venlafaxine (EFFEXOR) 75 MG tablet TAKE 2 TABLETS BY MOUTH DAILY       OBJECTIVE:  /82   Pulse 72   Temp 97.8 °F (36.6 °C) (Temporal)   Resp 18   Ht 152.4 cm (60\")   Wt 70.3 kg (155 lb)   SpO2 97%   BMI 30.27 kg/m²    Physical Exam  Vitals and nursing note reviewed.   Constitutional:       Appearance: Normal appearance.   HENT:      Head: Normocephalic and atraumatic.      Right Ear: Hearing, ear canal and external ear normal. A middle ear effusion is present. There is no impacted cerumen.      Left Ear: Hearing, ear canal and external ear normal. A middle ear effusion is present. There is no impacted cerumen.      Nose: Nose normal.   Eyes:      Conjunctiva/sclera: Conjunctivae normal.   Cardiovascular:      Pulses:           Dorsalis pedis pulses are 2+ on the left side.        Posterior tibial pulses are 2+ on the left side.   Musculoskeletal:      Left foot: Decreased range of motion.        Feet:    Feet:      Left foot:      Skin integrity: No erythema or warmth.      Toenail Condition: Left toenails are normal.      " Comments: inflammation  Neurological:      General: No focal deficit present.      Mental Status: She is alert and oriented to person, place, and time.   Psychiatric:         Mood and Affect: Mood normal.         Behavior: Behavior normal.         Procedures    Assessment/Plan:     Diagnoses and all orders for this visit:    1. Allergic rhinitis, unspecified seasonality, unspecified trigger (Primary)  -     Ambulatory Referral to ENT (Otolaryngology)    2. Chronic ROHAN (middle ear effusion), bilateral  -     Ambulatory Referral to ENT (Otolaryngology)  -     methylPREDNISolone (MEDROL) 4 MG dose pack; Take as directed on package instructions.  Dispense: 21 tablet; Refill: 0    3. Left ankle swelling  -     XR Ankle 3+ View Left; Future  -     methylPREDNISolone (MEDROL) 4 MG dose pack; Take as directed on package instructions.  Dispense: 21 tablet; Refill: 0             An After Visit Summary was printed and given to the patient at discharge.  No follow-ups on file.       There are no Patient Instructions on file for this visit.      Discussion:     I spent 32 minutes caring for Viviana on this date of service. This time includes time spent by me in the following activities: preparing for the visit, reviewing tests, obtaining and/or reviewing a separately obtained history, performing a medically appropriate examination and/or evaluation, counseling and educating the patient/family/caregiver, ordering medications, tests, or procedures, referring and communicating with other health care professionals, documenting information in the medical record, and independently interpreting results and communicating that information with the patient/family/caregiver     Abiola CONNOR 6/4/2024   Electronically signed.

## 2024-06-04 NOTE — TELEPHONE ENCOUNTER
----- Message from [facility] [employer] sent at 6/4/2024 10:10 AM CDT -----  Regarding: Appointment Request  Contact: [phone]  Appointment Request From: Viviana Arzate    With Provider: Frederick Fry [[facility]]    Preferred Date Range: 6/4/2024 – 6/6/2024    Preferred Times: Any Time    Reason for visit: New Problem Visit    Comments:  left ankle

## 2024-06-05 NOTE — PROGRESS NOTES
No fracture on xray, continue rest, ice, compression, elevation as we discussed. Continue OTC antiinflammatories

## 2024-06-12 ENCOUNTER — TELEPHONE (OUTPATIENT)
Dept: OTOLARYNGOLOGY | Facility: CLINIC | Age: 54
End: 2024-06-12
Payer: COMMERCIAL

## 2024-06-12 NOTE — TELEPHONE ENCOUNTER
Hub staff attempted to follow warm transfer process and was unsuccessful     Caller: HEIDI FLETCHER     Relationship to patient: SELF    Best call back number: [phone] (home)       Patient is needing: PT IS CALLING DENNISE BACK AS NEEDS THE AFTERNOON APPT SHE WAS OFFERED NOT THE MORNING. PLEASE CALL PT BACK TO RESCHEDULE. THANK YOU

## 2024-06-13 ENCOUNTER — OFFICE VISIT (OUTPATIENT)
Dept: OTOLARYNGOLOGY | Facility: CLINIC | Age: 54
End: 2024-06-13
Payer: COMMERCIAL

## 2024-06-13 VITALS
SYSTOLIC BLOOD PRESSURE: 112 MMHG | BODY MASS INDEX: 30.23 KG/M2 | TEMPERATURE: 97.8 F | DIASTOLIC BLOOD PRESSURE: 85 MMHG | HEART RATE: 72 BPM | HEIGHT: 60 IN | WEIGHT: 154 LBS

## 2024-06-13 DIAGNOSIS — J31.0 CHRONIC RHINITIS: ICD-10-CM

## 2024-06-13 DIAGNOSIS — R42 VERTIGO: ICD-10-CM

## 2024-06-13 DIAGNOSIS — Z01.10 TYPE A TYMPANOGRAM: ICD-10-CM

## 2024-06-13 DIAGNOSIS — H69.93 DYSFUNCTION OF BOTH EUSTACHIAN TUBES: Primary | ICD-10-CM

## 2024-06-13 DIAGNOSIS — H91.93 DECREASED HEARING OF BOTH EARS: ICD-10-CM

## 2024-06-13 DIAGNOSIS — Z91.09 ENVIRONMENTAL ALLERGIES: ICD-10-CM

## 2024-06-13 NOTE — PATIENT INSTRUCTIONS
>NASAL STEROID use:  Using nasal steroids:  You will be prescribed one of the following nasal steroids: Flonase, Nasacort, Nasonex, Rhinocort, Qnasl, Zetonna  2 puffs each nostril 2 times daily  Start as soon as possible  If you are using Afrin for 3 days with the nasal steroid,  Use Afrin first and wait 10 minutes to allow the nose to open. Then administer nasal steroids.     >NASAL SALINE:  Use 2 puffs each nostril 4-6 times daily and more frequently if possible.  You can buy saline spray or you can make your own and use an old spray bottle to administer  Use a humidifier at bedside  Recipe for saline:  Water                                 1 quart  Salt (table)                        1 tablespoon  Gylcerin (or Radha Syrup)    1 teaspoon  Sodium bicarbonate           1 teaspoon  Sprays or Yasmeen pots are recommended    Do not allow to stand for more than 24 hrs. Make new solution. There is no preservative in this solution.    Sinus irrigation and saline application can be enhanced with various over-the-counter products.  A WaterPik can be quite useful to irrigate, especially following sinus surgery.  Navage makes a product that irrigates the nose and some of the sinuses.  NeilMed makes a Sinu-Gator to irrigate the sinuses.  Neomed also has canned saline that we will come out under pressure.  A Yasmeen pot can also be helpful.  All of these products help keep the nose clear of debris.  Please use as directed on the instructions that come with the particular device.     How to pop ears:  Pop your ears by holding nose and closing mouth, then blow hard until ears pop  Children (less than 8-9 years)- blow up ballons 4 times a day and more frequently     VESTIBULAR EXERCISES:    Goals:  >To develop proprioceptive and visual mechanisms to compensate for a disturbance in balance function (labyrinthine system)  >To improve muscle coordination in general  T>o practice balancing under everyday conditions with special attention  "to using the eyes,  muscle and joint senses  >To train the movement of the eyes independent of the head  >To loosen the muscles of the neck and shoulder to overcome the protective muscular spasm and tendency to move \"in one piece\"  >To practice head movements that cause dizziness and thus gradually overcome the disability  >To encourage the restoration of self-confidence and easy spontaneous motion     Exercise Program:  A. Eye exercises (while seated in bed). Perform 5 to 10 minutes at least 5 times each day; first do slowly, then quickly. Perform 20 times each.  1.     Up and down  2.     Side to side  3.     Diagonal  4.     Focus on finger moving from 3 feet to one foot from face    B. Head exercises. To be done at first slowly with eyes open in primary position, then quickly. Later do with eyes closed. Perform 20 times each.  1.     Bending forward and backward  2.     Turning from side to side  3.     Tilting from side to side  4.     Diagonal movements    C. Coordinate the movement of head and eyes in the same direction as in B.    D. Shoulder shrugging and circling. Perform 20 times each.    E. While seated, bend forward and  objects from the ground. Perform 20 times.    F.  Standing exercises. Perform 20 times each.  1. Repeat section A  2. Change from a sitting to a standing position with the eyes open and shut.  3. Throw ball from hand to hand, above eye level             4. Throw ball from hand to hand, under knee  5. Change form from sitting to standing, turning around in between     Full activity: Perform 10 times each.  1. Walk across room with eyes open, then closed  2. Walk up and down a slope with eyes open, then closed  3. Walk up and down steps with eyes open, then closed  4. Sit up and lie down in bed  5. Stand up and sit down in a chair  6. Recover balance when pushed in any direction  7. Throw and catch a ball  8. Any game involving stooping, straining and aiming     The following are " of value in rehabilitating patients with balance problems:  1. A light cane may be used, not for walking, but to provide additional proprioceptive            orienting input.  2. While walking, the patient should not look down, but rather select a distant point for          fixation.  3. Night-lights should be left on in the patient’s home.  4. In-patients with cervical spine problems, especially in those that head turning        increase unsteadiness, the use of soft foam cervical collar limits motion and improves         stability. Exercises should be modified accordingly.  5. Mild central stimulants (Ritalin, Caffeine) are useful in alerting the patient to respond       quickly to orienting input.  6. Optimal visual correction should be achieved and maintained. If cataract surgery is           being performed, then contact lens are recommended to avoid optical distortion.  7.  Extreme fatigue and stress is to be avoided, as this may worsen the dizziness.  8.  Sedatives, vestibular suppressants and tranquilizers (Valium, Phenergan, Antivert, Dramamine, Klonopin, etc.) are to be avoided, as this may slow compensation by the brain and cause drowsiness.     Low salt diet  Increase water, decrease caffeine

## 2024-06-13 NOTE — PROGRESS NOTES
NIKOLAS Hilario  [facility]  [address]  [facility] [address]  Fax [phone]  Phone [phone]      Visit Type: NEW PATIENT   Chief Complaint   Patient presents with    Ear Problem     Fluid on ears, recurrent           HPI  Viviana Arzate is a 53 y.o. female who presents for evaluation of sinus and ear complaints. Has been present for years, 20+. Non localized. She report chronic rhinitis with mostly nasal drainage and some stuffiness, ear pressure and fullness, occasional popping and crackling in ears, some decreased hearing. She states she is told by other providers she has fluid on ears often.   She denies recurrent sinus infections, facial pressure or pain, ear infections, drainage, or tinnitus.   She does report dizziness, spinning sensation, usually occurs 2-3 times per week. Promethazine helps. If she does not take episodes can last all day or until she goes to sleep. Triggers include rolling over in bed, rapid head movement, and sometimes seem random.     She has been using Atrovent which she says helps. She does use Flonase and Astelin as needed.    Past Medical History:   Diagnosis Date    ADHD (attention deficit hyperactivity disorder)     Allergic rhinitis     Anxiety     Dental disease     Dizziness     Family history of colonic polyps     GERD (gastroesophageal reflux disease)     Headache     IBS (irritable bowel syndrome)     MVP (mitral valve prolapse)     Myocardial infarction     Nosebleed     As long as I can remember    Vitamin D deficiency        Past Surgical History:   Procedure Laterality Date    COLONOSCOPY N/A 07/01/2019    Biopsies from left colon normal.    COLONOSCOPY N/A 07/15/2022    Colon biopsies throughout normal.  Repeat colonoscopy in 10 years.       Family History: Her family history includes Colon polyps in her mother; Heart disease in her father; Heart failure in her father;  Irritable bowel syndrome in her mother.     Social History: She  reports that she has never smoked. She has never been exposed to tobacco smoke. She has never used smokeless tobacco. She reports current alcohol use of about 8.0 standard drinks of alcohol per week. She reports that she does not use drugs.    Home Medications:  ALPRAZolam, Co Q-10, Probiotic Product, Syringe/Needle (Disp), acebutolol, azelastine, cyanocobalamin, fexofenadine-pseudoephedrine, fluticasone, ibuprofen, ipratropium, levonorgestrel, meclizine, pravastatin, promethazine, valACYclovir, and venlafaxine    Allergies:  She is allergic to lamictal [lamotrigine] and sulfa antibiotics.       Vital Signs:   Temp:  [97.8 °F (36.6 °C)] 97.8 °F (36.6 °C)  Heart Rate:  [72] 72  BP: (112)/(85) 112/85  ENT Physical Exam  Constitutional  Appearance: patient appears well-developed, well-nourished and well-groomed,  Communication/Voice: communication appropriate for developmental age; vocal quality normal;  Head and Face  Appearance: head appears normal, face appears normal and face appears atraumatic;  Palpation: facial palpation normal;  Ear  Hearing: intact;  Auricles: bilateral auricles normal;  External Mastoids: bilateral external mastoids normal;  Ear Canals: bilateral ear canals normal;  Tympanic Membranes: right tympanic membrane abnormal; with monomeric segment; left tympanic membrane abnormal; with monomeric segment;  Nose  External Nose: nares patent bilaterally; external nose normal;  Internal Nose: nasal mucosa normal; nasal septal deviation present; deviation is to the right, septal deviation is intermediate; bilateral inferior turbinates normal;  Oral Cavity/Oropharynx  Lips: normal;  Teeth: normal;  Gums: gingiva normal;  Tongue: normal;  Oral mucosa: normal;  Hard palate: normal;  Soft palate: normal;  Tonsils: normal;  Neurovestibular  Gait: gait normal;  Station: standing is normal;  Coordination: coordination normal;  Strength: normal  strength  Sensation - Light Touch: light touch normal  Vestibular: normal;               Result Review       RESULTS REVIEW    I have reviewed the patients old records in the chart.   The following results/records were reviewed:     Referral to Otolaryngology for Allergic rhinitis, unspecified seasonality, unspecified trigger; Chronic ROHAN (middle ear effusion), bilateral (06/04/2024)     Office Visit with Abiola Burnette APRN (06/04/2024)          Assessment & Plan  Dysfunction of both eustachian tubes    Vertigo    Decreased hearing of both ears    Chronic rhinitis    Environmental allergies    Type A tympanogram       Orders Placed This Encounter   Procedures    Comprehensive Hearing Test    Basic Vestibular Evaluation    Tympanometry           Tympanometry obtained in office today, Type A bilaterally.       Medical and surgical options were discussed including observation, continued medical management, medication modification, and surgical management. Risks, benefits and alternatives were discussed and questions were answered. After considering the options, the patient decided to proceed with medication modification.  Call for ear problems, especially change of hearing, ear pain or dizziness.  For the best results, use nasal sprays every day. It may take a week to build up in the nasal lining and a few more weeks to improve the eustachian tube function.  Protect getting water in the ears. If needed, may use over the counter silicone plugs or a cotton ball coated with vasoline when bathing.  Use hairdryer on a cool setting after bathing.  Use hearing protection in loud noise situations.  Follow a low salt diet to try to prevent inner ear fluid accumulation.  Decrease caffeine, avoid red wine, nitrites in cured meats, food additives (like monosodium glutamate/ MSG) and dyes.  Continue nasal sprays as previously prescribed.  Saline nasal spray or saline gel to nose three times a day and as needed. Use a bedside  humidifier at night. Place Vasoline in nose in the morning and at night.  Place Vasoline in nose in the morning and at night on the center part of the nose (septum) at least 15 minutes prior to the nasal spray. When using the nasal spray, aim towards the outer corner of the eye.   For the best response, use your nasal sprays every day without skipping doses. It may take several weeks before the full effect is acheived.   GENERAL ALLERGY AVOIDANCE: Regular vacuum cleaning is  recommended  to prevent accumulation of allergens. Use adequate filtration, including double thickness bags or HEPA filters on the  outlet. Use air-conditioning where possible. Change central air filters with an allergy rated filter on a regular basis. Install car pollen filters where possible.   Start flonase and astelin daily  Saline spray  Can use Atrovent as needed  Home vestibular exercises  Low salt diet  Increase water, decrease caffeine and stimulant use  Audio and brandt simmons pike at follow up for suspected vertigo.  May consider allergy testing in future      Call with questions or concerns.    Return in about 6 weeks (around 7/25/2024) for Recheck with audio and brandt simmons pike.        Electronically signed by NIKOLAS Hilario 06/13/24 2:10 PM CDT.

## 2024-06-14 ENCOUNTER — PATIENT ROUNDING (BHMG ONLY) (OUTPATIENT)
Dept: OTOLARYNGOLOGY | Facility: CLINIC | Age: 54
End: 2024-06-14
Payer: COMMERCIAL

## 2024-06-14 NOTE — PROGRESS NOTES
June 14, 2024    Hello, may I speak with Viviana Huey?    My name is jonny      I am  with [facility]  [address]  [place] [address] [phone].    Before we get started may I verify your date of birth? 1970    I am calling to officially welcome you to our practice and ask about your recent visit. Is this a good time to talk? yes    Tell me about your visit with us. What things went well?  the visit was fine       We're always looking for ways to make our patients' experiences even better. Do you have recommendations on ways we may improve?  no    Overall were you satisfied with your first visit to our practice? yes       I appreciate you taking the time to speak with me today. Is there anything else I can do for you? no      Thank you, and have a great day.

## 2024-06-24 DIAGNOSIS — F41.9 ANXIETY: ICD-10-CM

## 2024-06-24 DIAGNOSIS — R11.0 NAUSEA: ICD-10-CM

## 2024-06-24 RX ORDER — PROMETHAZINE HYDROCHLORIDE 25 MG/1
25 TABLET ORAL EVERY 6 HOURS PRN
Qty: 30 TABLET | Refills: 0 | Status: SHIPPED | OUTPATIENT
Start: 2024-06-24

## 2024-06-24 NOTE — TELEPHONE ENCOUNTER
Rx Refill Note  Requested Prescriptions     Pending Prescriptions Disp Refills    ALPRAZolam (XANAX) 0.5 MG tablet 30 tablet 0     Sig: Take 1 tablet by mouth 2 (Two) Times a Day As Needed for Anxiety. for anxiety     Signed Prescriptions Disp Refills    promethazine (PHENERGAN) 25 MG tablet 30 tablet 0     Sig: Take 1 tablet by mouth Every 6 (Six) Hours As Needed for Nausea or Vomiting.     Authorizing Provider: MAEGAN RIVERA     Ordering User: CECI ROSA      Last office visit with prescribing clinician: 3/6/2024   Last telemedicine visit with prescribing clinician: Visit date not found   Next office visit with prescribing clinician: 12/4/2024                         Would you like a call back once the refill request has been completed: [] Yes [] No    If the office needs to give you a call back, can they leave a voicemail: [] Yes [] No    Ceci Rosa MA  06/24/24, 13:41 CDT

## 2024-06-25 RX ORDER — ALPRAZOLAM 0.5 MG/1
0.5 TABLET ORAL 2 TIMES DAILY PRN
Qty: 30 TABLET | Refills: 0 | Status: SHIPPED | OUTPATIENT
Start: 2024-06-25

## 2024-07-29 DIAGNOSIS — F41.9 ANXIETY: ICD-10-CM

## 2024-07-29 DIAGNOSIS — R11.0 NAUSEA: ICD-10-CM

## 2024-07-29 RX ORDER — PROMETHAZINE HYDROCHLORIDE 25 MG/1
25 TABLET ORAL EVERY 6 HOURS PRN
Qty: 30 TABLET | Refills: 0 | Status: SHIPPED | OUTPATIENT
Start: 2024-07-29

## 2024-07-29 NOTE — TELEPHONE ENCOUNTER
"Controlled substances  Lorry Wilson called to request a refill on their medication.  Last office visit : 7/29/2024   Next office visit : 12/4/2024   Last Rupert: N/A  Medication Contract: 03/06/24  Last UDS:3/6/24  Last Rx: 06/25/24  No results found for: \"LABBARB\", \"LABBENZ\", \"GABAPENTIN\", \"MDMA\", \"PROPOXYPHENE\"  Requested Prescriptions     Pending Prescriptions Disp Refills    ALPRAZolam (XANAX) 0.5 MG tablet 30 tablet 0     Sig: Take 1 tablet by mouth 2 (Two) Times a Day As Needed for Anxiety. for anxiety     Please approve or refuse this medication.  Ceci Rosa MA  "

## 2024-07-31 RX ORDER — ALPRAZOLAM 0.5 MG/1
0.5 TABLET ORAL 2 TIMES DAILY PRN
Qty: 30 TABLET | Refills: 0 | Status: SHIPPED | OUTPATIENT
Start: 2024-07-31

## 2024-10-02 ENCOUNTER — OFFICE VISIT (OUTPATIENT)
Dept: FAMILY MEDICINE CLINIC | Facility: CLINIC | Age: 54
End: 2024-10-02
Payer: COMMERCIAL

## 2024-10-02 ENCOUNTER — HOSPITAL ENCOUNTER (OUTPATIENT)
Dept: GENERAL RADIOLOGY | Facility: HOSPITAL | Age: 54
Discharge: HOME | End: 2024-10-02
Payer: COMMERCIAL

## 2024-10-02 VITALS
HEART RATE: 69 BPM | DIASTOLIC BLOOD PRESSURE: 80 MMHG | TEMPERATURE: 97.3 F | SYSTOLIC BLOOD PRESSURE: 100 MMHG | OXYGEN SATURATION: 94 % | BODY MASS INDEX: 28.27 KG/M2 | WEIGHT: 144 LBS | HEIGHT: 60 IN

## 2024-10-02 DIAGNOSIS — M65.4 DE QUERVAIN'S TENOSYNOVITIS: Primary | ICD-10-CM

## 2024-10-02 DIAGNOSIS — M65.4 DE QUERVAIN'S TENOSYNOVITIS: ICD-10-CM

## 2024-10-02 PROCEDURE — 73110 X-RAY EXAM OF WRIST: CPT

## 2024-10-02 PROCEDURE — 99213 OFFICE O/P EST LOW 20 MIN: CPT

## 2024-10-02 RX ORDER — MELOXICAM 7.5 MG/1
7.5 TABLET ORAL DAILY
Qty: 15 TABLET | Refills: 0 | Status: SHIPPED | OUTPATIENT
Start: 2024-10-02

## 2024-10-02 NOTE — PROGRESS NOTES
" Kirti Cuadra DO  [facility]   Family Medicine  [address]  [place] [address]  Phone: [phone]  Fax: [phone]         Chief Complaint:  Chief Complaint   Patient presents with    Wrist Pain     Patient presents to clinic with C/O Left wrist pain. Patient denies any injury    Sx's ongoing for: 1 month  Treatment tried/failed: cold compress, OTC IBU          History:  Viviana Arzate is a 54 y.o. female who presents with left wrist pain.  Intermittent left wrist/thumb pain over the last month.  Denies any acute injury.  Does natali.  No numbness, tingling.  Does feel decreased  strength due to pain.  Cold compress helps, along with occasional ibuprofen.             Current Outpatient Medications   Medication Instructions    acebutolol (SECTRAL) 200 mg, Oral, Daily    Allegra-D Allergy & Congestion 180-240 MG per 24 hr tablet 1 tablet, Oral, Daily, For inner ear symptoms and allergy control    ALPRAZolam (XANAX) 0.5 mg, Oral, 2 Times Daily PRN, for anxiety    azelastine (ASTELIN) 0.1 % nasal spray 2 sprays, Nasal, 2 Times Daily, Use in each nostril as directed    Coenzyme Q10 (Co Q-10) 200 MG capsule 1 capsule, Oral, Daily    cyanocobalamin 1,000 mcg, Intramuscular, Every 28 Days    fluticasone (FLONASE) 50 MCG/ACT nasal spray     ibuprofen (ADVIL,MOTRIN) 200 MG tablet 1 tablet, Oral, Every 6 Hours PRN    ipratropium (ATROVENT) 0.06 % nasal spray INHALE 2 SPRAYS IN EACH NOSTRIL THREE TIMES DAILY AS NEEDED FOR ALLERGY AND CONGESTION    levonorgestrel (MIRENA) 20 MCG/24HR IUD 1 each, Intrauterine, Once    meclizine (ANTIVERT) 25 mg, Oral, 3 Times Daily PRN    meloxicam (MOBIC) 7.5 mg, Oral, Daily    pravastatin (PRAVACHOL) 40 mg, Oral, Nightly    Probiotic Product (PROBIOTIC ADVANCED PO) 1 capsule, Oral, Daily    promethazine (PHENERGAN) 25 mg, Oral, Every 6 Hours PRN    Syringe/Needle, Disp, (BD Integra Syringe) 25G X 1\" 3 ML misc USE WITH B12 SHOT    valACYclovir (VALTREX) 500 " "mg, Oral, Daily    venlafaxine (EFFEXOR) 75 MG tablet TAKE 2 TABLETS BY MOUTH DAILY       OBJECTIVE:  /80 (BP Location: Right arm, Patient Position: Sitting, Cuff Size: Adult)   Pulse 69   Temp 97.3 °F (36.3 °C) (Temporal)   Ht 152.4 cm (60\")   Wt 65.3 kg (144 lb)   SpO2 94%   BMI 28.12 kg/m²      Gen: No acute distress.   Head and neck: Normocephalic, atraumatic.  HEENT: PERRLA, EOMI.  CV: Well perfused, no pallor.   Resp: Normal respiratory effort. No distress.   Musculoskeletal: No gross deformity of left hand/wrist.  Exquisite tenderness at base of the thumb, worsening with Eichoff maneuver.   Neuro: Alert and oriented.   Skin: No visible rash or erythema.   Psych: Appropriate.       Procedures    Assessment/Plan:     Diagnoses and all orders for this visit:    1. De Quervain's tenosynovitis (Primary)  Discussed etiology and risk factors.  Discussed using a brace as needed.  -     XR Wrist 3+ View Left; Future  -     meloxicam (Mobic) 7.5 MG tablet; Take 1 tablet by mouth Daily.  Dispense: 15 tablet; Refill: 0        An After Visit Summary was printed and given to the patient at discharge.  Return in about 3 years (around 10/2/2027) for Video visit.           Kirti Cuadra DO  10/2/2024   Electronically signed.    "

## 2024-10-14 ENCOUNTER — TELEPHONE (OUTPATIENT)
Dept: FAMILY MEDICINE CLINIC | Facility: CLINIC | Age: 54
End: 2024-10-14
Payer: COMMERCIAL

## 2024-10-14 ENCOUNTER — PATIENT MESSAGE (OUTPATIENT)
Dept: FAMILY MEDICINE CLINIC | Facility: CLINIC | Age: 54
End: 2024-10-14

## 2024-10-14 NOTE — TELEPHONE ENCOUNTER
Patient sent a message through [employer] stating she didn't believe the medication was helping with her wrist pain.  Patient was seen on 10/02/24 for left wrist pain. Patient was treated for left De Quervains tenosynovitis. Negative xray. Patient was Rx'd Mobic. Patient has a follow up appt on 10/24/24.  Please advise on what you would like patient to do. Thanks!

## 2024-10-14 NOTE — TELEPHONE ENCOUNTER
She can increase and take 2 of the mobic (15mg total) daily. Some patients find that ibuprofen works better, but you have to take it multiple times daily. If she would instead like to try this, can take ibuprofen 800mg 3 times daily. Please re-iterate that she does not take both, only mobic OR ibuprofen.     Dr. Cuadra

## 2024-10-18 DIAGNOSIS — M65.4 DE QUERVAIN'S TENOSYNOVITIS: ICD-10-CM

## 2024-10-18 RX ORDER — MELOXICAM 7.5 MG/1
7.5 TABLET ORAL DAILY
Qty: 15 TABLET | Refills: 0 | Status: SHIPPED | OUTPATIENT
Start: 2024-10-18

## 2024-10-24 ENCOUNTER — TELEMEDICINE (OUTPATIENT)
Dept: FAMILY MEDICINE CLINIC | Facility: CLINIC | Age: 54
End: 2024-10-24
Payer: COMMERCIAL

## 2024-10-24 VITALS — HEIGHT: 60 IN | WEIGHT: 141 LBS | BODY MASS INDEX: 27.68 KG/M2

## 2024-10-24 DIAGNOSIS — F41.9 ANXIETY: Chronic | ICD-10-CM

## 2024-10-24 DIAGNOSIS — G47.00 INSOMNIA, UNSPECIFIED TYPE: Chronic | ICD-10-CM

## 2024-10-24 DIAGNOSIS — R45.86 MOOD SWINGS: Chronic | ICD-10-CM

## 2024-10-24 DIAGNOSIS — F32.A DEPRESSION, UNSPECIFIED DEPRESSION TYPE: Chronic | ICD-10-CM

## 2024-10-24 DIAGNOSIS — F41.9 ANXIETY: ICD-10-CM

## 2024-10-24 DIAGNOSIS — M65.4 DE QUERVAIN'S TENOSYNOVITIS: Primary | ICD-10-CM

## 2024-10-24 PROCEDURE — 99213 OFFICE O/P EST LOW 20 MIN: CPT

## 2024-10-24 RX ORDER — VENLAFAXINE 75 MG/1
TABLET ORAL
Qty: 60 TABLET | Refills: 2 | Status: SHIPPED | OUTPATIENT
Start: 2024-10-24

## 2024-10-24 RX ORDER — MELOXICAM 7.5 MG/1
15 TABLET ORAL DAILY
Qty: 60 TABLET | Refills: 0 | Status: SHIPPED | OUTPATIENT
Start: 2024-10-24

## 2024-10-24 RX ORDER — ALPRAZOLAM 0.5 MG
0.5 TABLET ORAL 2 TIMES DAILY PRN
Qty: 40 TABLET | Refills: 0 | Status: SHIPPED | OUTPATIENT
Start: 2024-10-24

## 2024-10-24 NOTE — PROGRESS NOTES
Kirti Cuadra,   [facility]   Family Medicine  [address]  [place] [address]  Phone: [phone]  Fax: [phone]     Viviana Arzate is a 54 y.o. female.   : 1970    You have chosen to receive care through a telehealth visit.  Do you consent to use a video/audio connection for your medical care today? Yes    Pt located at Home and provider located at home office.     CC:   Chief Complaint   Patient presents with    Wrist Pain     Virtual visit to follow up on Left De Quervain's tenosynovitis. Patient states the pain has much improved. Mobic is working to help with the inflammation and pain. Patient continues to wear wrist brace that is helping as well.        HPI: Viviana Arzate is a 54 y.o. female that is an established patient. She  is here for evaluation of the above complaint. Feels like wrist is doing better.  Very little pain in the last 2 to 3 days.  Has been wearing her brace, and taking her Mobic 7.5's twice daily.  Asking about preventive things she can do.    She is also requesting a refill of her Xanax.  She takes this largely at night to help her sleep.  But then does need it occasionally during the day due to increased stress.  Has had no side effects from the medicine.    The following portions of the patient's history were reviewed and updated as appropriate: allergies, current medications, past family history, past medical history, past social history, past surgical history, and problem list.  Past Medical History:   Diagnosis Date    ADHD (attention deficit hyperactivity disorder)     Allergic rhinitis     Anxiety     Dental disease     Dizziness     Family history of colonic polyps     GERD (gastroesophageal reflux disease)     Headache     IBS (irritable bowel syndrome)     MVP (mitral valve prolapse)     Myocardial infarction     Nosebleed     As long as I can remember    Vitamin D deficiency      Family History   Problem Relation Age of Onset    Colon  "polyps Mother         < 60 years of age    Irritable bowel syndrome Mother         Possibly Crohn's or UC but pt unsure    Heart disease Father          of heart disease    Heart failure Father     Colon cancer Neg Hx     Esophageal cancer Neg Hx     Liver cancer Neg Hx     Stomach cancer Neg Hx     Rectal cancer Neg Hx     Liver disease Neg Hx      Social History     Socioeconomic History    Marital status:    Tobacco Use    Smoking status: Never     Passive exposure: Never    Smokeless tobacco: Never   Vaping Use    Vaping status: Never Used   Substance and Sexual Activity    Alcohol use: Yes     Alcohol/week: 8.0 standard drinks of alcohol     Types: 5 Glasses of wine, 3 Shots of liquor per week     Comment: Occasional-3- 4 glasses of wine a week    Drug use: No    Sexual activity: Yes     Partners: Male     Birth control/protection: I.U.D.     Comment: Mirena     Review of Systems  Ht 152.4 cm (60\")   Wt 64 kg (141 lb)   BMI 27.54 kg/m²   Physical Exam  Gen: No acute distress.   Head and neck: Normocephalic, atraumatic.  HEENT: PERRLA, EOMI.  CV: Well perfused, no pallor.   Resp: Normal respiratory effort. No distress.   Musculoskeletal: No gross deformity.   Neuro: Alert and oriented.   Skin: No visible rash or erythema.   Psych: Appropriate.       Assessment and Plan:   Diagnoses and all orders for this visit:    1. De Quervain's tenosynovitis (Primary)  Improved.  Still with some minimal discomfort with certain movements.  Encouraged using Mobic regularly for another week, then as needed there afterwards.  Can wear braces as needed as well, specifically when doing repetitive movements like crocheting.  -     meloxicam (Mobic) 7.5 MG tablet; Take 2 tablets by mouth Daily.  Dispense: 60 tablet; Refill: 0    2. Anxiety  Controlled on current regimen.  Takes Effexor 75 mg, along with Xanax 0.5 mg twice daily as needed.  Largely uses it to sleep at night, but then occasionally needs it throughout the " day.  PDMP reviewed and refill sent to the pharmacy.  -     ALPRAZolam (XANAX) 0.5 MG tablet; Take 1 tablet by mouth 2 (Two) Times a Day As Needed for Anxiety. for anxiety  Dispense: 40 tablet; Refill: 0        Follow up: chio Cuadra DO  10/24/2024  08:07 CDT

## 2024-11-15 ENCOUNTER — PATIENT ROUNDING (BHMG ONLY) (OUTPATIENT)
Age: 54
End: 2024-11-15

## 2024-11-15 ENCOUNTER — PATIENT MESSAGE (OUTPATIENT)
Dept: FAMILY MEDICINE CLINIC | Facility: CLINIC | Age: 54
End: 2024-11-15
Payer: COMMERCIAL

## 2024-11-15 ENCOUNTER — OFFICE VISIT (OUTPATIENT)
Age: 54
End: 2024-11-15
Payer: COMMERCIAL

## 2024-11-15 ENCOUNTER — OFFICE VISIT (OUTPATIENT)
Dept: FAMILY MEDICINE CLINIC | Facility: CLINIC | Age: 54
End: 2024-11-15
Payer: COMMERCIAL

## 2024-11-15 VITALS — BODY MASS INDEX: 27.09 KG/M2 | HEIGHT: 60 IN | WEIGHT: 138 LBS

## 2024-11-15 VITALS
OXYGEN SATURATION: 100 % | BODY MASS INDEX: 27.09 KG/M2 | DIASTOLIC BLOOD PRESSURE: 72 MMHG | TEMPERATURE: 97.7 F | SYSTOLIC BLOOD PRESSURE: 118 MMHG | WEIGHT: 138 LBS | HEART RATE: 64 BPM | HEIGHT: 60 IN

## 2024-11-15 DIAGNOSIS — M65.4 TENOSYNOVITIS, DE QUERVAIN: Primary | ICD-10-CM

## 2024-11-15 DIAGNOSIS — M65.4 DE QUERVAIN'S TENOSYNOVITIS: ICD-10-CM

## 2024-11-15 DIAGNOSIS — M25.532 ACUTE PAIN OF LEFT WRIST: ICD-10-CM

## 2024-11-15 PROCEDURE — 99213 OFFICE O/P EST LOW 20 MIN: CPT | Performed by: NURSE PRACTITIONER

## 2024-11-15 RX ORDER — MELOXICAM 7.5 MG/1
15 TABLET ORAL DAILY
Qty: 60 TABLET | Refills: 2 | Status: SHIPPED | OUTPATIENT
Start: 2024-11-15 | End: 2024-11-15 | Stop reason: SDUPTHER

## 2024-11-15 RX ORDER — TRIAMCINOLONE ACETONIDE 40 MG/ML
20 INJECTION, SUSPENSION INTRA-ARTICULAR; INTRAMUSCULAR ONCE
Status: COMPLETED | OUTPATIENT
Start: 2024-11-15 | End: 2024-11-15

## 2024-11-15 RX ORDER — LIDOCAINE HYDROCHLORIDE 10 MG/ML
2 INJECTION, SOLUTION INFILTRATION; PERINEURAL ONCE
Status: COMPLETED | OUTPATIENT
Start: 2024-11-15 | End: 2024-11-15

## 2024-11-15 RX ADMIN — LIDOCAINE HYDROCHLORIDE 2 ML: 10 INJECTION, SOLUTION INFILTRATION; PERINEURAL at 14:30

## 2024-11-15 RX ADMIN — TRIAMCINOLONE ACETONIDE 20 MG: 40 INJECTION, SUSPENSION INTRA-ARTICULAR; INTRAMUSCULAR at 14:31

## 2024-11-15 NOTE — PROGRESS NOTES
[facility]  Cachorro Reyes MD, PhD  Yohannes Reyes PA-C    CHIEF COMPLAINT  Initial Evaluation of the Left Wrist (Patient presents today for left wrist pain. X-ray performed at  on 10/02/24. Patient complains of instability and pain in thumb area. )       HISTORY OF PRESENT ILLNESS  Patient with wrist pain.  She has previously been prescribed Mobic and a brace.  It seemed to be doing somewhat better but then this morning she was in severe pain in her left wrist.  History of Present Illness  The patient presents for evaluation of left wrist pain.    She reports experiencing severe pain in her left wrist upon waking this morning. Approximately a month ago, she sought medical attention for an aching sensation in the same wrist and was diagnosed with De Quervain's tenosynovitis. She was prescribed meloxicam and advised to wear a brace, which she did at night using an Ace bandage. This treatment seemed to alleviate the pain, although a slight ache persisted.    For the past week, she has been pain-free unless she exerts pressure on the wrist. However, after engaging in [employer] activities this week, she woke up with severe pain that brought her to tears. She took three ibuprofen tablets, which provided relief after about an hour. By lunchtime, the pain had returned.    She is seeking further treatment options, including medication, physical therapy, or injections. She notes that moving her fingers does not cause pain, but wrapping her fingers around her thumb does.       HISTORY    Current Outpatient Medications   Medication Instructions    acebutolol (SECTRAL) 200 mg, Oral, Daily    ALPRAZolam (XANAX) 0.5 mg, Oral, 2 Times Daily PRN, for anxiety    azelastine (ASTELIN) 0.1 % nasal spray 2 sprays, Nasal, 2 Times Daily, Use in each nostril as directed    Coenzyme Q10 (Co Q-10) 200 MG capsule 1 capsule, Oral, Daily    cyanocobalamin 1,000 mcg, Intramuscular, Every  "28 Days    fluticasone (FLONASE) 50 MCG/ACT nasal spray     ibuprofen (ADVIL,MOTRIN) 200 MG tablet 1 tablet, Every 6 Hours PRN    ipratropium (ATROVENT) 0.06 % nasal spray INHALE 2 SPRAYS IN EACH NOSTRIL THREE TIMES DAILY AS NEEDED FOR ALLERGY AND CONGESTION    levonorgestrel (MIRENA) 20 MCG/24HR IUD 1 each, Once    Meloxicam 15 mg, Translingual, Daily    pravastatin (PRAVACHOL) 40 mg, Oral, Nightly    Probiotic Product (PROBIOTIC ADVANCED PO) 1 capsule, Daily    promethazine (PHENERGAN) 25 mg, Oral, Every 6 Hours PRN    Syringe/Needle, Disp, (BD Integra Syringe) 25G X 1\" 3 ML misc USE WITH B12 SHOT    Tirzepatide-Weight Management (ZEPBOUND) 2.5 mg    valACYclovir (VALTREX) 500 mg, Oral, Daily    venlafaxine (EFFEXOR) 75 MG tablet TAKE 2 TABLETS BY MOUTH DAILY         reports that she has never smoked. She has never been exposed to tobacco smoke. She has never used smokeless tobacco. She reports current alcohol use of about 8.0 standard drinks of alcohol per week. She reports that she does not use drugs.    Past Medical History:   Diagnosis Date    ADHD (attention deficit hyperactivity disorder)     Allergic rhinitis     Anxiety     Dental disease     Dizziness     Family history of colonic polyps     GERD (gastroesophageal reflux disease)     Headache     IBS (irritable bowel syndrome)     MVP (mitral valve prolapse)     Myocardial infarction     Nosebleed     As long as I can remember    Vitamin D deficiency         Past Surgical History:   Procedure Laterality Date    COLON RESECTION      COLONOSCOPY N/A 07/01/2019    Biopsies from left colon normal.    COLONOSCOPY N/A 07/15/2022    Colon biopsies throughout normal.  Repeat colonoscopy in 10 years.        PHYSICAL EXAM  Constitutional: The patient is in no apparent distress and generally well-appearing. The patient hears me clearly and answers questions appropriately.   Musculoskeletal:  Physical Exam  Positive Finkelstein's test on the left wrist. Nontender at " the first CMC, first dorsal compartment, and radial styloid. Normal radial pulse.      IMAGING  Narrative & Impression   EXAMINATION: XR WRIST 3+ VW LEFT-     10/2/2024 2:53 PM     HISTORY: wrist pain; M65.4-Radial styloid tenosynovitis (de quervain)     3 view LEFT wrist exam.     The distal radius and ulna are normal.      The carpal bones align normally.      The proximal metacarpals are normal, to the extent visualized.     No discrete soft tissue abnormality is seen.     Summary:  1. No acute bony abnormality is seen.              This report was signed and finalized on 10/2/2024 4:07 PM by Dr. Jan Coats MD.     Left wrist x-rays personally reviewed.  No acute osseous abnormalities.  No fracture.  Well-maintained joint spaces.  Results         ASSESSMENT & PLAN  Diagnoses and all orders for this visit:    1. Tenosynovitis, de Quervain (Primary)  -     triamcinolone acetonide (KENALOG-40) injection 20 mg  -     lidocaine (XYLOCAINE) 1 % injection 2 mL  -     Miscellaneous DME    2. Acute pain of left wrist  -     Miscellaneous DME         Assessment & Plan  1. De Quervain's tenosynovitis.  The patient's symptoms and physical examination findings, including a positive Finkelstein's test and pain with thumb movement, are consistent with De Quervain's tenosynovitis. An ultrasound-guided injection will be administered today to alleviate the inflammation and pain. A thumb spica brace will be provided for use during activities involving the hands over the next few weeks to protect the thumb and prevent further strain.    Patient symptoms seem most consistent with de Quervain's tenosynovitis/tendinopathy of the wrist. X-rays were normal. We discussed how the etiology is not always well understood but often involves repetitive motions.  If symptoms are not improved with standard measures, we may consider other potential diagnoses including osteoarthritis, intersection syndrome, radial nerve entrapment,  "ganglia.    General treatment measures discussed:  -Thumb spica splint- The splint may be worn according to patient preferences. Full-time wear of the splint is not necessary since there is evidence that immobilization does not alter the disease course  -Nonsteroidal anti-inflammatory drugs-oral or topical  -For patients with persistent pain and swelling despite splinting and NSAIDs, we suggest a local glucocorticoid injection. However, for patients with severe symptoms, a glucocorticoid injection may be appropriate at the initial presentation.  -Surgery is generally reserved for patients with persistent symptoms despite nonoperative therapy and glucocorticoid injection. Most patients who understand that de Quervain tendinopathy is benign and self-limiting are unlikely to choose surgery.     Ultrasound-guided first dorsal compartment injection  Thumb spica brace    BMI is >= 25 and <30. (Overweight) The following options were offered after discussion;: information on healthy weight added to patient's after visit summary      Procedure note:  Ultrasound-guided De Quervain's Injection Procedure Note    Left De Quervain's injection was discussed with the patient in detail, including indication, risks, benefits, and alternatives. Risks include but are not limited to: incomplete symptom resolution, injection site pain, local irritation, bleeding, infection, allergic reaction, elevated blood sugar, tendon rupture. Signed consent was given for the procedure.   [facility] ultrasound machine with linear L8-18i (\"hockey stick\") probe utilized.  First dorsal compartment was imaged in short and long axis and images were labeled and saved to the patient's chart.  Ultrasound imaging revealed swelling around the tendons without any obvious tendon rupture.  Injection site was cleaned with chlorhexidine and alcohol swabs. Prior to needle insertion, ethyl chloride spray was used for surface anesthesia.  A 25-gauge, 1.5\" needle was " directed into the first extensor compartment, in the area of the abductor pollicis long and extensor pollicis brevis, under direct ultrasound visualization, taking care not to penetrate the tendons themselves. Injectate was passed into the space without difficulty. The needle was removed and a simple bandage was applied. The procedure was tolerated well without difficulty.    Injection mixture:  1% plain lidocaine: 2mL  40 mg/mL triamcinolone acetonide: 0.5 mL (20mg)     FOLLOW-UP  No follow-ups on file.    Patient or patient representative verbalized consent for the use of Ambient Listening during the visit with  Cachorro Reyes MD for chart documentation. 11/15/2024  14:26 CST    Cachorro Reyes MD, PhD

## 2024-11-15 NOTE — PROGRESS NOTES
November 15, 2024    Hello, may I speak with Viviana Arzate?    My name is Gaby.      I am  with [facility]  [address] [place] [id number] [phone].    Before we get started may I verify your date of birth? 1970    I am calling to officially welcome you to our practice and ask about your recent visit. Is this a good time to talk? yes    Tell me about your visit with us. What things went well?  Able to get in same day, Dr. Reyes was great!       We're always looking for ways to make our patients' experiences even better. Do you have recommendations on ways we may improve?  no    Overall were you satisfied with your first visit to our practice? yes       I appreciate you taking the time to speak with me today. Is there anything else I can do for you? no      Thank you, and have a great day.

## 2024-11-15 NOTE — PROGRESS NOTES
Abiola Burnette, NIKOLAS  [facility]   Family Medicine  [address]  [place] [address]  Phone: [phone]  Fax: [phone]         Chief Complaint:  Chief Complaint   Patient presents with    Wrist Pain     Patient was seen in the past for this and prescribed Mobic and a brace. Was doing better but this morning she was in severe pain Left wrist        History:  Viviana Arzate is a 54 y.o. female that is an established patient. She  is here for evaluation of the above complaint.    HPI   History of Present Illness  The patient presents for evaluation of wrist pain.    She reports experiencing severe left wrist pain upon waking this morning, which was so intense it induced nausea. She has been diagnosed with De Quervain's tenosynovitis 10/2/2024. Initially, she managed the pain with rest, meloxicam, and an Ace bandage, which provided some relief. However, the pain has escalated over the past few days, becoming unbearable this morning.    She has been taking meloxicam, which has somewhat alleviated the pain, but she still experiences discomfort when gripping objects or moving her thumb.  She suspects that tying ribbons last night may have exacerbated the pain. She uses a brace or Ace bandage at night for support.    I messaged Dr. Cachorro Reyes in orthopedics and he kindly agreed to work Tae in this afternoon for evaluation. I will also RF her meloxicam. She has f/u with Poly Linares, NIKOLAS 12/4/2024.      Results         ROS:  Review of Systems   Constitutional: Negative.    HENT: Negative.     Respiratory: Negative.     Musculoskeletal:         Left wrist pain         reports that she has never smoked. She has never been exposed to tobacco smoke. She has never used smokeless tobacco. She reports current alcohol use of about 8.0 standard drinks of alcohol per week. She reports that she does not use drugs.    Current Outpatient Medications   Medication Instructions    acebutolol  "(SECTRAL) 200 mg, Oral, Daily    ALPRAZolam (XANAX) 0.5 mg, Oral, 2 Times Daily PRN, for anxiety    azelastine (ASTELIN) 0.1 % nasal spray 2 sprays, Nasal, 2 Times Daily, Use in each nostril as directed    Coenzyme Q10 (Co Q-10) 200 MG capsule 1 capsule, Oral, Daily    cyanocobalamin 1,000 mcg, Intramuscular, Every 28 Days    fluticasone (FLONASE) 50 MCG/ACT nasal spray     ibuprofen (ADVIL,MOTRIN) 200 MG tablet 1 tablet, Every 6 Hours PRN    ipratropium (ATROVENT) 0.06 % nasal spray INHALE 2 SPRAYS IN EACH NOSTRIL THREE TIMES DAILY AS NEEDED FOR ALLERGY AND CONGESTION    levonorgestrel (MIRENA) 20 MCG/24HR IUD 1 each, Once    meloxicam (MOBIC) 15 mg, Oral, Daily    pravastatin (PRAVACHOL) 40 mg, Oral, Nightly    Probiotic Product (PROBIOTIC ADVANCED PO) 1 capsule, Daily    promethazine (PHENERGAN) 25 mg, Oral, Every 6 Hours PRN    Syringe/Needle, Disp, (BD Integra Syringe) 25G X 1\" 3 ML misc USE WITH B12 SHOT    valACYclovir (VALTREX) 500 mg, Oral, Daily    venlafaxine (EFFEXOR) 75 MG tablet TAKE 2 TABLETS BY MOUTH DAILY       OBJECTIVE:  /72   Pulse 64   Temp 97.7 °F (36.5 °C)   Ht 152.4 cm (60\")   Wt 62.6 kg (138 lb)   SpO2 100%   BMI 26.95 kg/m²    Physical Exam  Vitals and nursing note reviewed.   Constitutional:       Appearance: Normal appearance.   HENT:      Head: Normocephalic and atraumatic.      Nose: Nose normal.   Eyes:      Conjunctiva/sclera: Conjunctivae normal.   Cardiovascular:      Rate and Rhythm: Normal rate and regular rhythm.   Pulmonary:      Effort: Pulmonary effort is normal. No respiratory distress.      Breath sounds: Normal breath sounds. No wheezing or rales.   Musculoskeletal:      Left wrist: Tenderness present. No swelling or effusion. Decreased range of motion.   Neurological:      General: No focal deficit present.      Mental Status: She is alert and oriented to person, place, and time.   Psychiatric:         Mood and Affect: Mood normal.         Behavior: Behavior " normal.       Physical Exam      Procedures    Assessment/Plan:     Diagnoses and all orders for this visit:    1. De Quervain's tenosynovitis  -     meloxicam (Mobic) 7.5 MG tablet; Take 2 tablets by mouth Daily.  Dispense: 60 tablet; Refill: 2  -     Ambulatory Referral to Sports Medicine      BMI is >= 25 and <30. (Overweight) The following options were offered after discussion;: exercise counseling/recommendations and nutrition counseling/recommendations     Assessment & Plan  1. De Quervain's tenosynovitis.  The patient reports severe wrist pain, which has worsened over the past few days, with intense pain this morning. She has been using ibuprofen, which provided some relief, but she is currently out of meloxicam. A referral to orthopedics was made for further evaluation and potential injection therapy. Meloxicam 7.5 mg twice a day was refilled with a couple of refills. She has been using an Ace bandage at night, and the use of a wrist splint will be discussed with orthopedics.      An After Visit Summary was printed and given to the patient at discharge.  No follow-ups on file.       There are no Patient Instructions on file for this visit.      Discussion:     I spent 25 minutes caring for Viviana on this date of service. This time includes time spent by me in the following activities: preparing for the visit, reviewing tests, performing a medically appropriate examination and/or evaluation, counseling and educating the patient/family/caregiver, referring and communicating with other health care professionals, documenting information in the medical record, independently interpreting results and communicating that information with the patient/family/caregiver, care coordination, ordering medications, obtaining a separately obtained history, and reviewing a separately obtained history   Patient or patient representative verbalized consent for the use of Ambient Listening during the visit with  Abiola Burnette  APRN for chart documentation. 11/15/2024  13:37 CST    Abiola Burnette APRN 11/15/2024   Electronically signed.

## 2024-12-04 ENCOUNTER — OFFICE VISIT (OUTPATIENT)
Dept: FAMILY MEDICINE CLINIC | Facility: CLINIC | Age: 54
End: 2024-12-04
Payer: COMMERCIAL

## 2024-12-04 VITALS
WEIGHT: 132.5 LBS | OXYGEN SATURATION: 98 % | RESPIRATION RATE: 20 BRPM | DIASTOLIC BLOOD PRESSURE: 75 MMHG | BODY MASS INDEX: 26.01 KG/M2 | HEART RATE: 75 BPM | HEIGHT: 60 IN | TEMPERATURE: 97.1 F | SYSTOLIC BLOOD PRESSURE: 110 MMHG

## 2024-12-04 DIAGNOSIS — E53.8 VITAMIN B 12 DEFICIENCY: ICD-10-CM

## 2024-12-04 DIAGNOSIS — G47.00 INSOMNIA, UNSPECIFIED TYPE: ICD-10-CM

## 2024-12-04 DIAGNOSIS — F41.9 ANXIETY: Primary | ICD-10-CM

## 2024-12-04 DIAGNOSIS — M65.4 DE QUERVAIN'S TENOSYNOVITIS: ICD-10-CM

## 2024-12-04 DIAGNOSIS — F32.A DEPRESSION, UNSPECIFIED DEPRESSION TYPE: ICD-10-CM

## 2024-12-04 DIAGNOSIS — Z51.81 MEDICATION MONITORING ENCOUNTER: ICD-10-CM

## 2024-12-04 DIAGNOSIS — E55.9 VITAMIN D DEFICIENCY: ICD-10-CM

## 2024-12-04 DIAGNOSIS — R45.86 MOOD SWINGS: ICD-10-CM

## 2024-12-23 RX ORDER — PRAVASTATIN SODIUM 40 MG
40 TABLET ORAL NIGHTLY
Qty: 90 TABLET | Refills: 2 | Status: SHIPPED | OUTPATIENT
Start: 2024-12-23

## 2024-12-27 ENCOUNTER — LAB (OUTPATIENT)
Dept: LAB | Facility: HOSPITAL | Age: 54
End: 2024-12-27
Payer: COMMERCIAL

## 2024-12-27 DIAGNOSIS — Z51.81 MEDICATION MONITORING ENCOUNTER: ICD-10-CM

## 2024-12-27 DIAGNOSIS — E53.8 VITAMIN B 12 DEFICIENCY: ICD-10-CM

## 2024-12-27 DIAGNOSIS — E55.9 VITAMIN D DEFICIENCY: ICD-10-CM

## 2024-12-27 DIAGNOSIS — G47.00 INSOMNIA, UNSPECIFIED TYPE: ICD-10-CM

## 2024-12-27 LAB
25(OH)D3 SERPL-MCNC: 55.8 NG/ML (ref 30–100)
ALBUMIN SERPL-MCNC: 4.4 G/DL (ref 3.5–5.2)
ALBUMIN/GLOB SERPL: 1.6 G/DL
ALP SERPL-CCNC: 76 U/L (ref 39–117)
ALT SERPL W P-5'-P-CCNC: 8 U/L (ref 1–33)
AMPHET+METHAMPHET UR QL: NEGATIVE
AMPHETAMINES UR QL: NEGATIVE
ANION GAP SERPL CALCULATED.3IONS-SCNC: 9 MMOL/L (ref 5–15)
AST SERPL-CCNC: 27 U/L (ref 1–32)
BACTERIA UR QL AUTO: ABNORMAL /HPF
BARBITURATES UR QL SCN: NEGATIVE
BASOPHILS # BLD AUTO: 0.04 10*3/MM3 (ref 0–0.2)
BASOPHILS NFR BLD AUTO: 0.6 % (ref 0–1.5)
BENZODIAZ UR QL SCN: POSITIVE
BILIRUB SERPL-MCNC: 0.6 MG/DL (ref 0–1.2)
BILIRUB UR QL STRIP: NEGATIVE
BUN SERPL-MCNC: 11 MG/DL (ref 6–20)
BUN/CREAT SERPL: 12 (ref 7–25)
BUPRENORPHINE SERPL-MCNC: NEGATIVE NG/ML
CALCIUM SPEC-SCNC: 9.1 MG/DL (ref 8.6–10.5)
CANNABINOIDS SERPL QL: NEGATIVE
CHLORIDE SERPL-SCNC: 102 MMOL/L (ref 98–107)
CHOLEST SERPL-MCNC: 159 MG/DL (ref 0–200)
CLARITY UR: ABNORMAL
CO2 SERPL-SCNC: 28 MMOL/L (ref 22–29)
COCAINE UR QL: NEGATIVE
COLOR UR: ABNORMAL
CREAT SERPL-MCNC: 0.92 MG/DL (ref 0.57–1)
DEPRECATED RDW RBC AUTO: 42 FL (ref 37–54)
EGFRCR SERPLBLD CKD-EPI 2021: 75.1 ML/MIN/1.73
EOSINOPHIL # BLD AUTO: 0.18 10*3/MM3 (ref 0–0.4)
EOSINOPHIL NFR BLD AUTO: 2.9 % (ref 0.3–6.2)
ERYTHROCYTE [DISTWIDTH] IN BLOOD BY AUTOMATED COUNT: 12.1 % (ref 12.3–15.4)
FENTANYL UR-MCNC: NEGATIVE NG/ML
GLOBULIN UR ELPH-MCNC: 2.7 GM/DL
GLUCOSE SERPL-MCNC: 101 MG/DL (ref 65–99)
GLUCOSE UR STRIP-MCNC: NEGATIVE MG/DL
HCT VFR BLD AUTO: 44 % (ref 34–46.6)
HDLC SERPL-MCNC: 44 MG/DL (ref 40–60)
HGB BLD-MCNC: 14.6 G/DL (ref 12–15.9)
HGB UR QL STRIP.AUTO: ABNORMAL
HYALINE CASTS UR QL AUTO: ABNORMAL /LPF
IMM GRANULOCYTES # BLD AUTO: 0.03 10*3/MM3 (ref 0–0.05)
IMM GRANULOCYTES NFR BLD AUTO: 0.5 % (ref 0–0.5)
KETONES UR QL STRIP: ABNORMAL
LDLC SERPL CALC-MCNC: 89 MG/DL (ref 0–100)
LDLC/HDLC SERPL: 1.93 {RATIO}
LEUKOCYTE ESTERASE UR QL STRIP.AUTO: ABNORMAL
LYMPHOCYTES # BLD AUTO: 1.75 10*3/MM3 (ref 0.7–3.1)
LYMPHOCYTES NFR BLD AUTO: 28.1 % (ref 19.6–45.3)
MCH RBC QN AUTO: 30.9 PG (ref 26.6–33)
MCHC RBC AUTO-ENTMCNC: 33.2 G/DL (ref 31.5–35.7)
MCV RBC AUTO: 93.2 FL (ref 79–97)
METHADONE UR QL SCN: NEGATIVE
MONOCYTES # BLD AUTO: 0.42 10*3/MM3 (ref 0.1–0.9)
MONOCYTES NFR BLD AUTO: 6.7 % (ref 5–12)
NEUTROPHILS NFR BLD AUTO: 3.81 10*3/MM3 (ref 1.7–7)
NEUTROPHILS NFR BLD AUTO: 61.2 % (ref 42.7–76)
NITRITE UR QL STRIP: NEGATIVE
NRBC BLD AUTO-RTO: 0 /100 WBC (ref 0–0.2)
OPIATES UR QL: NEGATIVE
OXYCODONE UR QL SCN: NEGATIVE
PCP UR QL SCN: NEGATIVE
PH UR STRIP.AUTO: 6 [PH] (ref 5–8)
PLATELET # BLD AUTO: 238 10*3/MM3 (ref 140–450)
PMV BLD AUTO: 9.1 FL (ref 6–12)
POTASSIUM SERPL-SCNC: 4 MMOL/L (ref 3.5–5.2)
PROT SERPL-MCNC: 7.1 G/DL (ref 6–8.5)
PROT UR QL STRIP: ABNORMAL
RBC # BLD AUTO: 4.72 10*6/MM3 (ref 3.77–5.28)
RBC # UR STRIP: ABNORMAL /HPF
REF LAB TEST METHOD: ABNORMAL
SODIUM SERPL-SCNC: 139 MMOL/L (ref 136–145)
SP GR UR STRIP: 1.02 (ref 1–1.03)
SQUAMOUS #/AREA URNS HPF: ABNORMAL /HPF
TRICYCLICS UR QL SCN: NEGATIVE
TRIGL SERPL-MCNC: 150 MG/DL (ref 0–150)
TSH SERPL DL<=0.05 MIU/L-ACNC: 1.64 UIU/ML (ref 0.27–4.2)
UROBILINOGEN UR QL STRIP: ABNORMAL
VIT B12 BLD-MCNC: 455 PG/ML (ref 211–946)
VLDLC SERPL-MCNC: 26 MG/DL (ref 5–40)
WBC # UR STRIP: ABNORMAL /HPF
WBC NRBC COR # BLD AUTO: 6.23 10*3/MM3 (ref 3.4–10.8)

## 2024-12-27 PROCEDURE — 80307 DRUG TEST PRSMV CHEM ANLYZR: CPT

## 2024-12-27 PROCEDURE — 80050 GENERAL HEALTH PANEL: CPT

## 2024-12-27 PROCEDURE — 36415 COLL VENOUS BLD VENIPUNCTURE: CPT

## 2024-12-27 PROCEDURE — 87086 URINE CULTURE/COLONY COUNT: CPT

## 2024-12-27 PROCEDURE — 82306 VITAMIN D 25 HYDROXY: CPT

## 2024-12-27 PROCEDURE — 84207 ASSAY OF VITAMIN B-6: CPT

## 2024-12-27 PROCEDURE — 80061 LIPID PANEL: CPT

## 2024-12-27 PROCEDURE — 82607 VITAMIN B-12: CPT

## 2024-12-27 PROCEDURE — 81001 URINALYSIS AUTO W/SCOPE: CPT

## 2024-12-28 LAB — BACTERIA SPEC AEROBE CULT: NO GROWTH

## 2025-01-02 LAB — PYRIDOXAL PHOS SERPL-MCNC: 12.2 UG/L (ref 3.4–65.2)

## 2025-01-05 DIAGNOSIS — J30.89 NON-SEASONAL ALLERGIC RHINITIS DUE TO OTHER ALLERGIC TRIGGER: ICD-10-CM

## 2025-01-05 DIAGNOSIS — R00.2 PALPITATIONS: Chronic | ICD-10-CM

## 2025-01-06 DIAGNOSIS — F41.9 ANXIETY: ICD-10-CM

## 2025-01-06 RX ORDER — ACEBUTOLOL HYDROCHLORIDE 200 MG/1
200 CAPSULE ORAL DAILY
Qty: 30 CAPSULE | Refills: 5 | Status: SHIPPED | OUTPATIENT
Start: 2025-01-06

## 2025-01-06 RX ORDER — IPRATROPIUM BROMIDE 42 UG/1
SPRAY, METERED NASAL
Qty: 45 ML | Refills: 3 | Status: SHIPPED | OUTPATIENT
Start: 2025-01-06

## 2025-01-06 NOTE — TELEPHONE ENCOUNTER
Rx Refill Note  Requested Prescriptions     Pending Prescriptions Disp Refills    ALPRAZolam (XANAX) 0.5 MG tablet 40 tablet 0     Sig: Take 1 tablet by mouth 2 (Two) Times a Day As Needed for Anxiety. for anxiety      Last office visit with prescribing clinician: 12/4/2024   Last telemedicine visit with prescribing clinician: 10/24/2024   Next office visit with prescribing clinician: 3/6/2025                         Would you like a call back once the refill request has been completed: [] Yes [] No    If the office needs to give you a call back, can they leave a voicemail: [] Yes [] No    Ceci Rosa MA  01/06/25, 10:45 CST

## 2025-01-07 RX ORDER — ALPRAZOLAM 0.5 MG
0.5 TABLET ORAL 2 TIMES DAILY PRN
Qty: 40 TABLET | Refills: 0 | Status: SHIPPED | OUTPATIENT
Start: 2025-01-07

## 2025-02-01 DIAGNOSIS — F41.9 ANXIETY: Chronic | ICD-10-CM

## 2025-02-01 DIAGNOSIS — R45.86 MOOD SWINGS: Chronic | ICD-10-CM

## 2025-02-01 DIAGNOSIS — F32.A DEPRESSION, UNSPECIFIED DEPRESSION TYPE: Chronic | ICD-10-CM

## 2025-02-01 DIAGNOSIS — G47.00 INSOMNIA, UNSPECIFIED TYPE: Chronic | ICD-10-CM

## 2025-02-03 RX ORDER — VENLAFAXINE 75 MG/1
TABLET ORAL
Qty: 60 TABLET | Refills: 2 | Status: SHIPPED | OUTPATIENT
Start: 2025-02-03

## 2025-02-28 DIAGNOSIS — F41.9 ANXIETY: ICD-10-CM

## 2025-02-28 RX ORDER — ALPRAZOLAM 0.5 MG
0.5 TABLET ORAL 2 TIMES DAILY PRN
Qty: 40 TABLET | Refills: 0 | Status: SHIPPED | OUTPATIENT
Start: 2025-02-28

## 2025-02-28 NOTE — TELEPHONE ENCOUNTER
Rx Refill Note  Requested Prescriptions     Pending Prescriptions Disp Refills    ALPRAZolam (XANAX) 0.5 MG tablet 40 tablet 0     Sig: Take 1 tablet by mouth 2 (Two) Times a Day As Needed for Anxiety. for anxiety      Last office visit with prescribing clinician: 12/4/2024   Last telemedicine visit with prescribing clinician: 10/24/2024   Next office visit with prescribing clinician: 3/6/2025                         Would you like a call back once the refill request has been completed: [] Yes [] No    If the office needs to give you a call back, can they leave a voicemail: [] Yes [] No    Ceci Rosa MA  02/28/25, 08:21 CST

## 2025-03-01 DIAGNOSIS — J30.89 NON-SEASONAL ALLERGIC RHINITIS DUE TO OTHER ALLERGIC TRIGGER: ICD-10-CM

## 2025-03-03 RX ORDER — IPRATROPIUM BROMIDE 42 UG/1
SPRAY, METERED NASAL
Qty: 45 ML | Refills: 3 | Status: SHIPPED | OUTPATIENT
Start: 2025-03-03 | End: 2025-03-06 | Stop reason: SDUPTHER

## 2025-03-06 ENCOUNTER — OFFICE VISIT (OUTPATIENT)
Dept: FAMILY MEDICINE CLINIC | Facility: CLINIC | Age: 55
End: 2025-03-06
Payer: COMMERCIAL

## 2025-03-06 VITALS
WEIGHT: 132.13 LBS | RESPIRATION RATE: 20 BRPM | DIASTOLIC BLOOD PRESSURE: 75 MMHG | HEIGHT: 60 IN | OXYGEN SATURATION: 98 % | HEART RATE: 70 BPM | SYSTOLIC BLOOD PRESSURE: 115 MMHG | TEMPERATURE: 98.1 F | BODY MASS INDEX: 25.94 KG/M2

## 2025-03-06 DIAGNOSIS — Z00.00 ADULT WELLNESS VISIT: Primary | ICD-10-CM

## 2025-03-06 DIAGNOSIS — E78.00 HYPERCHOLESTEROLEMIA: ICD-10-CM

## 2025-03-06 DIAGNOSIS — E53.8 VITAMIN B 12 DEFICIENCY: ICD-10-CM

## 2025-03-06 DIAGNOSIS — E66.9 OBESITY WITH SERIOUS COMORBIDITY, UNSPECIFIED CLASS, UNSPECIFIED OBESITY TYPE: ICD-10-CM

## 2025-03-06 DIAGNOSIS — Z23 ENCOUNTER FOR ADMINISTRATION OF VACCINE: ICD-10-CM

## 2025-03-06 DIAGNOSIS — E53.8 B12 DEFICIENCY: ICD-10-CM

## 2025-03-06 DIAGNOSIS — R00.2 PALPITATIONS: Chronic | ICD-10-CM

## 2025-03-06 DIAGNOSIS — J30.89 NON-SEASONAL ALLERGIC RHINITIS DUE TO OTHER ALLERGIC TRIGGER: ICD-10-CM

## 2025-03-06 RX ORDER — CYANOCOBALAMIN 1000 UG/ML
1000 INJECTION, SOLUTION INTRAMUSCULAR; SUBCUTANEOUS
Qty: 1 ML | Refills: 11 | Status: SHIPPED | OUTPATIENT
Start: 2025-03-06

## 2025-03-06 RX ORDER — ACEBUTOLOL HYDROCHLORIDE 200 MG/1
200 CAPSULE ORAL DAILY
Qty: 90 CAPSULE | Refills: 4 | Status: SHIPPED | OUTPATIENT
Start: 2025-03-06

## 2025-03-06 RX ORDER — IPRATROPIUM BROMIDE 42 UG/1
SPRAY, METERED NASAL
Qty: 3 EACH | Refills: 4 | Status: SHIPPED | OUTPATIENT
Start: 2025-03-06

## 2025-03-06 RX ORDER — PRAVASTATIN SODIUM 40 MG
40 TABLET ORAL NIGHTLY
Qty: 90 TABLET | Refills: 4 | Status: SHIPPED | OUTPATIENT
Start: 2025-03-06

## 2025-03-06 RX ORDER — NEEDLES, FILTER 19GX1 1/2"
NEEDLE, DISPOSABLE MISCELLANEOUS
Qty: 5 EACH | Refills: 5 | Status: SHIPPED | OUTPATIENT
Start: 2025-03-06

## 2025-03-06 NOTE — PROGRESS NOTES
NIKOLAS Adair  [facility]   Family Medicine  [address]  [place] [address]  Phone: [phone]  Fax: [phone]         Chief Complaint:  Chief Complaint   Patient presents with    Anxiety     Patient is following up for a 3 month check up        History:  Viviana Arzate is a 54 y.o. female.    Wellness:   Immunizations:      - Tetanus: Unknown or >10 years ago. Recommend to have at pharmacy or on injury.      - Influenza: Recommend yearly.      - Prevnar: will receive today.       - Shingrix: Series after 50      - COVID: Recommended per CDC guidelines.       -RSV: not indicated.   CRC screenin, normal due . No family hx of colon cancer.   Mammogram: UTD, no family hx of breast cancer.   PAP: WNL 2024 and the result was: normal PAP with negative HPV. Repeat 5 years.   DEXA: DEXA scan at 67   Low dose CT chest: Not indicated.   Mental health concerns: None.   Smoking status: reports that she has never smoked. She has never been exposed to tobacco smoke. She has never used smokeless tobacco. She reports current alcohol use of about 8.0 standard drinks of alcohol per week. She reports that she does not use drugs.    History of Present Illness      2. Anxiety: Reports she is doing well with her anxiety.  She reports compliance with Effexor 150 mg daily.  Patient takes (2) 75 mg tablets daily.  Patient also takes Xanax 0.5 mg twice daily as needed.  PDMP reviewed.  Medication contract updated.  UDS up-to-date and appropriate.         ROS:  Review of Systems   Constitutional:  Negative for chills, diaphoresis, fatigue and fever.   HENT:  Negative for congestion and sore throat.    Respiratory:  Negative for cough.    Cardiovascular:  Negative for chest pain.   Gastrointestinal:  Negative for abdominal pain, nausea and vomiting.   Genitourinary:  Negative for dysuria.   Musculoskeletal:  Negative for myalgias and neck pain.   Skin:  Negative for rash.  "  Neurological:  Negative for weakness, numbness and headaches.        reports that she has never smoked. She has never been exposed to tobacco smoke. She has never used smokeless tobacco. She reports current alcohol use of about 8.0 standard drinks of alcohol per week. She reports that she does not use drugs.    Current Outpatient Medications   Medication Instructions    acebutolol (SECTRAL) 200 mg, Oral, Daily    ALPRAZolam (XANAX) 0.5 mg, Oral, 2 Times Daily PRN, for anxiety    azelastine (ASTELIN) 0.1 % nasal spray 2 sprays, Nasal, 2 Times Daily, Use in each nostril as directed    Coenzyme Q10 (Co Q-10) 200 MG capsule 1 capsule, Oral, Daily    cyanocobalamin 1,000 mcg, Intramuscular, Every 28 Days    fluticasone (FLONASE) 50 MCG/ACT nasal spray     ibuprofen (ADVIL,MOTRIN) 200 MG tablet 1 tablet, Every 6 Hours PRN    ipratropium (ATROVENT) 0.06 % nasal spray INHALE 2 SPRAYS IN EACH NOSTRIL THREE TIMES DAILY AS NEEDED FOR ALLERGY AND CONGESTION    levonorgestrel (MIRENA) 20 MCG/24HR IUD 1 each, Once    pravastatin (PRAVACHOL) 40 mg, Oral, Nightly    Probiotic Product (PROBIOTIC ADVANCED PO) 1 capsule, Daily    promethazine (PHENERGAN) 25 mg, Oral, Every 6 Hours PRN    Syringe/Needle, Disp, (BD Integra Syringe) 25G X 1\" 3 ML misc USE WITH B12 SHOT    Tirzepatide-Weight Management (ZEPBOUND) 2.5 mg, Subcutaneous, Weekly    valACYclovir (VALTREX) 500 mg, Oral, Daily    venlafaxine (EFFEXOR) 75 MG tablet TAKE 2 TABLETS BY MOUTH DAILY       OBJECTIVE:  /75 (BP Location: Left arm, Patient Position: Sitting, Cuff Size: Adult)   Pulse 70   Temp 98.1 °F (36.7 °C) (Infrared)   Resp 20   Ht 152.4 cm (60\")   Wt 59.9 kg (132 lb 2 oz)   SpO2 98%   BMI 25.80 kg/m²    Physical Exam  Vitals and nursing note reviewed.   Constitutional:       Appearance: Normal appearance. She is well-developed.   HENT:      Head: Normocephalic and atraumatic.      Right Ear: Tympanic membrane, ear canal and external ear normal.      " Left Ear: Tympanic membrane, ear canal and external ear normal.      Nose: Nose normal. No septal deviation, nasal tenderness or congestion.      Mouth/Throat:      Lips: Pink. No lesions.      Mouth: Mucous membranes are moist. No oral lesions.      Dentition: Normal dentition.      Pharynx: Oropharynx is clear. No pharyngeal swelling, oropharyngeal exudate or posterior oropharyngeal erythema.   Eyes:      General: Lids are normal. Vision grossly intact. No scleral icterus.        Right eye: No discharge.         Left eye: No discharge.      Extraocular Movements: Extraocular movements intact.      Conjunctiva/sclera: Conjunctivae normal.      Right eye: Right conjunctiva is not injected.      Left eye: Left conjunctiva is not injected.      Pupils: Pupils are equal, round, and reactive to light.   Neck:      Thyroid: No thyroid mass.      Trachea: Trachea normal.   Cardiovascular:      Rate and Rhythm: Normal rate and regular rhythm.      Heart sounds: Normal heart sounds. No murmur heard.     No gallop.   Pulmonary:      Effort: Pulmonary effort is normal.      Breath sounds: Normal breath sounds and air entry. No wheezing, rhonchi or rales.   Musculoskeletal:         General: No tenderness or deformity. Normal range of motion.      Cervical back: Full passive range of motion without pain, normal range of motion and neck supple.      Thoracic back: Normal.      Right lower leg: No edema.      Left lower leg: No edema.   Skin:     General: Skin is warm and dry.      Coloration: Skin is not jaundiced.      Findings: No rash.   Neurological:      Mental Status: She is alert and oriented to person, place, and time.      Sensory: Sensation is intact.      Motor: Motor function is intact.      Coordination: Coordination is intact.      Gait: Gait is intact.      Deep Tendon Reflexes: Reflexes are normal and symmetric.   Psychiatric:         Mood and Affect: Mood and affect normal.         Behavior: Behavior normal.     "     Judgment: Judgment normal.       Physical Exam           Procedures    Results      Assessment/Plan:     Diagnoses and all orders for this visit:    1. Adult wellness visit (Primary)    2. Vitamin B 12 deficiency  -     cyanocobalamin 1000 MCG/ML injection; Inject 1 mL into the appropriate muscle as directed by prescriber Every 28 (Twenty-Eight) Days.  Dispense: 1 mL; Refill: 11    3. B12 deficiency  -     Syringe/Needle, Disp, (BD Integra Syringe) 25G X 1\" 3 ML misc; USE WITH B12 SHOT  Dispense: 5 each; Refill: 5    4. Palpitations  Comments:  stable.  Orders:  -     acebutolol (SECTRAL) 200 MG capsule; Take 1 capsule by mouth Daily.  Dispense: 90 capsule; Refill: 4    5. Non-seasonal allergic rhinitis due to other allergic trigger  -     ipratropium (ATROVENT) 0.06 % nasal spray; INHALE 2 SPRAYS IN EACH NOSTRIL THREE TIMES DAILY AS NEEDED FOR ALLERGY AND CONGESTION  Dispense: 3 each; Refill: 4    6. Obesity with serious comorbidity, unspecified class, unspecified obesity type  -     Tirzepatide-Weight Management (ZEPBOUND) 2.5 MG/0.5ML solution auto-injector; Inject 0.5 mL under the skin into the appropriate area as directed 1 (One) Time Per Week.  Dispense: 2 mL; Refill: 2    7. Hypercholesterolemia  -     pravastatin (PRAVACHOL) 40 MG tablet; Take 1 tablet by mouth Every Night.  Dispense: 90 tablet; Refill: 4    8. Encounter for administration of vaccine  -     Pneumococcal Conjugate Vaccine 20-Valent All  -     Tdap Vaccine => 6yo IM (BOOSTRIX/ADACEL)          An After Visit Summary was printed and given to the patient at discharge.  Return in about 3 months (around 6/6/2025).       Assessment & Plan      I spent 10 minutes on wellness exam and 31 minutes on care of chronic conditions minutes caring for Viviana on this date of service. This time includes time spent by me in the following activities: preparing for the visit, reviewing tests, performing a medically appropriate examination and/or evaluation, " counseling and educating the patient/family/caregiver, documenting information in the medical record, independently interpreting results and communicating that information with the patient/family/caregiver, care coordination, ordering medications, ordering test(s), and ordering procedure(s)         Poly CONNOR 3/7/2025   Electronically signed.    Patient or patient representative verbalized consent for the use of Ambient Listening during the visit with  NIKOLAS Adair for chart documentation. 3/7/2025  22:54 CST

## 2025-04-30 DIAGNOSIS — E66.9 OBESITY WITH SERIOUS COMORBIDITY, UNSPECIFIED CLASS, UNSPECIFIED OBESITY TYPE: ICD-10-CM

## 2025-04-30 DIAGNOSIS — F41.9 ANXIETY: ICD-10-CM

## 2025-04-30 RX ORDER — ALPRAZOLAM 0.5 MG
0.5 TABLET ORAL 2 TIMES DAILY PRN
Qty: 40 TABLET | Refills: 0 | Status: SHIPPED | OUTPATIENT
Start: 2025-04-30

## 2025-04-30 NOTE — TELEPHONE ENCOUNTER
Rx Refill Note  Requested Prescriptions     Pending Prescriptions Disp Refills    ALPRAZolam (XANAX) 0.5 MG tablet 40 tablet 0     Sig: Take 1 tablet by mouth 2 (Two) Times a Day As Needed for Anxiety. for anxiety     Signed Prescriptions Disp Refills    Tirzepatide-Weight Management (ZEPBOUND) 2.5 MG/0.5ML solution auto-injector 2 mL 2     Sig: Inject 0.5 mL under the skin into the appropriate area as directed 1 (One) Time Per Week.     Authorizing Provider: MAEGAN RIVERA     Ordering User: CECI ROSA      Last office visit with prescribing clinician: 3/6/2025   Last telemedicine visit with prescribing clinician: Visit date not found   Next office visit with prescribing clinician: 6/6/2025                         Would you like a call back once the refill request has been completed: [] Yes [] No    If the office needs to give you a call back, can they leave a voicemail: [] Yes [] No    Ceci Rosa MA  04/30/25, 08:26 CDT

## 2025-05-02 DIAGNOSIS — F32.A DEPRESSION, UNSPECIFIED DEPRESSION TYPE: Chronic | ICD-10-CM

## 2025-05-02 DIAGNOSIS — G47.00 INSOMNIA, UNSPECIFIED TYPE: Chronic | ICD-10-CM

## 2025-05-02 DIAGNOSIS — F41.9 ANXIETY: Chronic | ICD-10-CM

## 2025-05-02 DIAGNOSIS — R45.86 MOOD SWINGS: Chronic | ICD-10-CM

## 2025-05-02 RX ORDER — VENLAFAXINE 75 MG/1
TABLET ORAL
Qty: 60 TABLET | Refills: 2 | Status: SHIPPED | OUTPATIENT
Start: 2025-05-02

## 2025-05-13 DIAGNOSIS — G47.00 INSOMNIA, UNSPECIFIED TYPE: Chronic | ICD-10-CM

## 2025-05-13 DIAGNOSIS — R45.86 MOOD SWINGS: Chronic | ICD-10-CM

## 2025-05-13 DIAGNOSIS — F41.9 ANXIETY: Chronic | ICD-10-CM

## 2025-05-13 DIAGNOSIS — F32.A DEPRESSION, UNSPECIFIED DEPRESSION TYPE: Chronic | ICD-10-CM

## 2025-05-13 RX ORDER — VENLAFAXINE 75 MG/1
TABLET ORAL
Qty: 60 TABLET | Refills: 2 | Status: SHIPPED | OUTPATIENT
Start: 2025-05-13

## 2025-06-06 ENCOUNTER — TELEMEDICINE (OUTPATIENT)
Dept: FAMILY MEDICINE CLINIC | Facility: CLINIC | Age: 55
End: 2025-06-06
Payer: COMMERCIAL

## 2025-06-06 VITALS — HEIGHT: 60 IN | BODY MASS INDEX: 25.8 KG/M2

## 2025-06-06 DIAGNOSIS — F32.A DEPRESSION, UNSPECIFIED DEPRESSION TYPE: Primary | Chronic | ICD-10-CM

## 2025-06-06 DIAGNOSIS — E78.00 HYPERCHOLESTEROLEMIA: ICD-10-CM

## 2025-06-06 DIAGNOSIS — R45.86 MOOD SWINGS: Chronic | ICD-10-CM

## 2025-06-06 DIAGNOSIS — F41.9 ANXIETY: ICD-10-CM

## 2025-06-06 DIAGNOSIS — G47.00 INSOMNIA, UNSPECIFIED TYPE: Chronic | ICD-10-CM

## 2025-06-06 DIAGNOSIS — F41.9 ANXIETY: Chronic | ICD-10-CM

## 2025-06-06 DIAGNOSIS — R07.9 CHEST PAIN, UNSPECIFIED TYPE: ICD-10-CM

## 2025-06-06 PROCEDURE — 99214 OFFICE O/P EST MOD 30 MIN: CPT | Performed by: NURSE PRACTITIONER

## 2025-06-06 RX ORDER — ALPRAZOLAM 0.5 MG
0.5 TABLET ORAL 2 TIMES DAILY PRN
Qty: 40 TABLET | Refills: 0 | Status: SHIPPED | OUTPATIENT
Start: 2025-06-06

## 2025-06-06 RX ORDER — VENLAFAXINE 75 MG/1
TABLET ORAL
Qty: 270 TABLET | Refills: 5 | Status: SHIPPED | OUTPATIENT
Start: 2025-06-06

## 2025-06-06 RX ORDER — UBIDECARENONE 75 MG
1 CAPSULE ORAL DAILY
Qty: 90 EACH | Refills: 4 | Status: SHIPPED | OUTPATIENT
Start: 2025-06-06

## 2025-06-06 NOTE — PROGRESS NOTES
NIKOLAS Adair  [facility]   Family Medicine  [address]  [place] [address]  Phone: [phone]  Fax: [phone]     Viviana Arzate is a 54 y.o. female.   : 1970    You have chosen to receive care through a telehealth visit.  Do you consent to use a video/audio connection for your medical care today? Yes    Pt located at Home and provider located at home office.     CC:   Chief Complaint   Patient presents with    Medication Discussion     Patient stated that she is wanting to discuss increasing her Effexor.        HPI: Viviana Arzate is a 54 y.o. female.    History of Present Illness      Patient reports that she is having increased anxiety and would like to increase her Effexor to 225 mg daily.  She notes she is doing well on her Xanax therapy.  PDMP reviewed and appropriate.    Last office visit : 2025    Next office visit : Visit date not found  Controlled Substance Agreement Signed: 3/6/2025  Last UDS:    Last Urine Toxicity  More data exists         Latest Ref Rng & Units 2024 3/6/2024   LAST URINE TOXICITY RESULTS   Amphetamine, Urine Qual Negative Negative  Negative    Barbiturates Screen, Urine Negative Negative  Negative    Benzodiazepine Screen, Urine Negative Positive  Positive    Buprenorphine, Screen, Urine Negative Negative  Negative    Cocaine Screen, Urine Negative Negative  Negative    Fentanyl, Urine Negative Negative  Negative    Methadone Screen , Urine Negative Negative  Negative    Methamphetamine, Ur Negative Negative  Negative       LIZ Report:   As part of this patient's treatment plan, I am prescribing controlled substances. The patient has been made aware of appropriate use of such medications, including potential risk of somnolence, limited ability to drive and /or work safely, and potential for dependence or overdose. It has also been made clear that these medications are for use by this patient only, without concomitant  "use of alcohol or other substances unless prescribed.     The following portions of the patient's history were reviewed and updated as appropriate: allergies, current medications, past family history, past medical history, past social history, past surgical history, and problem list.  Past Medical History:   Diagnosis Date    ADHD (attention deficit hyperactivity disorder)     Allergic rhinitis     Anxiety     Dental disease     Dizziness     Family history of colonic polyps     GERD (gastroesophageal reflux disease)     Headache     IBS (irritable bowel syndrome)     MVP (mitral valve prolapse)     Myocardial infarction     Nosebleed     As long as I can remember    Vitamin D deficiency      Family History   Problem Relation Age of Onset    Colon polyps Mother         < 60 years of age    Irritable bowel syndrome Mother         Possibly Crohn's or UC but pt unsure    Heart disease Father          of heart disease    Heart failure Father     Colon cancer Neg Hx     Esophageal cancer Neg Hx     Liver cancer Neg Hx     Stomach cancer Neg Hx     Rectal cancer Neg Hx     Liver disease Neg Hx      Social History     Socioeconomic History    Marital status:    Tobacco Use    Smoking status: Never     Passive exposure: Never    Smokeless tobacco: Never   Vaping Use    Vaping status: Never Used   Substance and Sexual Activity    Alcohol use: Yes     Alcohol/week: 8.0 standard drinks of alcohol     Types: 5 Glasses of wine, 3 Shots of liquor per week     Comment: Occasional-3- 4 glasses of wine a week    Drug use: No    Sexual activity: Yes     Partners: Male     Birth control/protection: I.U.D.     Comment: Mirena     Review of Systems  Ht 152.4 cm (60\")   BMI 25.80 kg/m²   Physical Exam  Physical Exam      Results      Assessment and Plan:   Diagnoses and all orders for this visit:    1. Depression, unspecified depression type (Primary)  Comments:  stable.  Orders:  -     venlafaxine (EFFEXOR) 75 MG tablet; " TAKE 3 TABLETS BY MOUTH DAILY  Dispense: 270 tablet; Refill: 5    2. Anxiety  Comments:  Stable on current therapy.  Orders:  -     venlafaxine (EFFEXOR) 75 MG tablet; TAKE 3 TABLETS BY MOUTH DAILY  Dispense: 270 tablet; Refill: 5  -     ALPRAZolam (XANAX) 0.5 MG tablet; Take 1 tablet by mouth 2 (Two) Times a Day As Needed for Anxiety. for anxiety  Dispense: 40 tablet; Refill: 0    3. Mood swings  Comments:  stable.  Orders:  -     venlafaxine (EFFEXOR) 75 MG tablet; TAKE 3 TABLETS BY MOUTH DAILY  Dispense: 270 tablet; Refill: 5    4. Insomnia, unspecified type  Comments:  stable.  Orders:  -     venlafaxine (EFFEXOR) 75 MG tablet; TAKE 3 TABLETS BY MOUTH DAILY  Dispense: 270 tablet; Refill: 5    5. Anxiety  -     venlafaxine (EFFEXOR) 75 MG tablet; TAKE 3 TABLETS BY MOUTH DAILY  Dispense: 270 tablet; Refill: 5  -     ALPRAZolam (XANAX) 0.5 MG tablet; Take 1 tablet by mouth 2 (Two) Times a Day As Needed for Anxiety. for anxiety  Dispense: 40 tablet; Refill: 0    6. Chest pain, unspecified type    7. Hypercholesterolemia  -     Coenzyme Q10 (Co Q-10) 200 MG capsule; Take 1 capsule by mouth Daily.  Dispense: 90 each; Refill: 4    Other orders  -     Semaglutide-Weight Management 0.25 MG/0.5ML solution auto-injector; Inject 0.5 mL under the skin into the appropriate area as directed 1 (One) Time Per Week.  Dispense: 2 mL; Refill: 0          Assessment & Plan      I spent 35 minutes caring for Viviana on this date of service. This time includes time spent by me in the following activities: preparing for the visit, reviewing tests, performing a medically appropriate examination and/or evaluation, counseling and educating the patient/family/caregiver, documenting information in the medical record, independently interpreting results and communicating that information with the patient/family/caregiver, care coordination, and ordering medications         Poly Linares, APRN   6/6/2025  19:18 CDT    Patient or  patient representative verbalized consent for the use of Ambient Listening during the visit with  NIKOLAS Adair for chart documentation. 6/6/2025  19:17 CDT

## 2025-08-19 DIAGNOSIS — F41.9 ANXIETY: ICD-10-CM

## 2025-08-20 RX ORDER — ALPRAZOLAM 0.5 MG
0.5 TABLET ORAL 2 TIMES DAILY PRN
Qty: 40 TABLET | Refills: 0 | Status: SHIPPED | OUTPATIENT
Start: 2025-08-20

## (undated) DEVICE — MASK,OXYGEN,MED CONC,ADLT,7' TUB, UC: Brand: PENDING

## (undated) DEVICE — THE CHANNEL CLEANING BRUSH IS A NYLON FLEXI BRUSH ATTACHED TO A FLEXIBLE PLASTIC SHEATH DESIGNED TO SAFELY REMOVE DEBRIS FROM FLEXIBLE ENDOSCOPES.

## (undated) DEVICE — YANKAUER,BULB TIP WITH VENT: Brand: ARGYLE

## (undated) DEVICE — FRCP BX RADJAW4 NDL 2.8 240 STD OG

## (undated) DEVICE — ENDOGATOR AUXILIARY WATER JET CONNECTOR: Brand: ENDOGATOR

## (undated) DEVICE — TBG SMPL FLTR LINE NASL 02/C02 A/ BX/100

## (undated) DEVICE — Device: Brand: DEFENDO AIR/WATER/SUCTION AND BIOPSY VALVE

## (undated) DEVICE — FRCP BIOP ENDO CAPTURAPRO SPK SERR 2.8MM 230CM

## (undated) DEVICE — SENSR O2 OXIMAX FNGR A/ 18IN NONSTR

## (undated) DEVICE — CUFF,BP,DISP,1 TUBE,ADULT,HP: Brand: MEDLINE